# Patient Record
Sex: FEMALE | Race: BLACK OR AFRICAN AMERICAN | NOT HISPANIC OR LATINO | Employment: UNEMPLOYED
[De-identification: names, ages, dates, MRNs, and addresses within clinical notes are randomized per-mention and may not be internally consistent; named-entity substitution may affect disease eponyms.]

---

## 2017-11-06 ENCOUNTER — RECORDS - HEALTHEAST (OUTPATIENT)
Dept: ADMINISTRATIVE | Facility: OTHER | Age: 42
End: 2017-11-06

## 2017-11-06 ENCOUNTER — OFFICE VISIT - HEALTHEAST (OUTPATIENT)
Dept: FAMILY MEDICINE | Facility: CLINIC | Age: 42
End: 2017-11-06

## 2017-11-06 DIAGNOSIS — G89.29 CHRONIC PAIN OF LEFT KNEE: ICD-10-CM

## 2017-11-06 DIAGNOSIS — Z00.00 HEALTH CARE MAINTENANCE: ICD-10-CM

## 2017-11-06 DIAGNOSIS — M25.562 CHRONIC PAIN OF LEFT KNEE: ICD-10-CM

## 2017-11-06 DIAGNOSIS — R20.2 PARESTHESIA OF BOTH LOWER EXTREMITIES: ICD-10-CM

## 2017-11-06 ASSESSMENT — MIFFLIN-ST. JEOR: SCORE: 1441.04

## 2017-11-07 ENCOUNTER — COMMUNICATION - HEALTHEAST (OUTPATIENT)
Dept: FAMILY MEDICINE | Facility: CLINIC | Age: 42
End: 2017-11-07

## 2017-11-09 ENCOUNTER — HOSPITAL ENCOUNTER (OUTPATIENT)
Dept: MRI IMAGING | Facility: HOSPITAL | Age: 42
Discharge: HOME | End: 2017-11-09
Attending: NURSE PRACTITIONER

## 2017-11-09 DIAGNOSIS — M25.562 CHRONIC PAIN OF LEFT KNEE: ICD-10-CM

## 2017-11-09 DIAGNOSIS — G89.29 CHRONIC PAIN OF LEFT KNEE: ICD-10-CM

## 2017-11-13 ENCOUNTER — COMMUNICATION - HEALTHEAST (OUTPATIENT)
Dept: FAMILY MEDICINE | Facility: CLINIC | Age: 42
End: 2017-11-13

## 2017-11-17 ENCOUNTER — RECORDS - HEALTHEAST (OUTPATIENT)
Dept: ADMINISTRATIVE | Facility: OTHER | Age: 42
End: 2017-11-17

## 2018-04-17 ENCOUNTER — OFFICE VISIT - HEALTHEAST (OUTPATIENT)
Dept: FAMILY MEDICINE | Facility: CLINIC | Age: 43
End: 2018-04-17

## 2018-04-17 DIAGNOSIS — Z13.220 LIPID SCREENING: ICD-10-CM

## 2018-04-17 DIAGNOSIS — Z00.00 ROUTINE GENERAL MEDICAL EXAMINATION AT A HEALTH CARE FACILITY: ICD-10-CM

## 2018-04-17 DIAGNOSIS — E66.9 OBESITY: ICD-10-CM

## 2018-04-17 DIAGNOSIS — Z13.1 DIABETES MELLITUS SCREENING: ICD-10-CM

## 2018-04-17 DIAGNOSIS — Z30.09 STERILIZATION CONSULT: ICD-10-CM

## 2018-04-17 LAB
CHOLEST SERPL-MCNC: 132 MG/DL
FASTING STATUS PATIENT QL REPORTED: YES
FASTING STATUS PATIENT QL REPORTED: YES
GLUCOSE BLD-MCNC: 93 MG/DL (ref 70–125)
HDLC SERPL-MCNC: 41 MG/DL
HGB BLD-MCNC: 12.3 G/DL (ref 12–16)
LDLC SERPL CALC-MCNC: 79 MG/DL
TRIGL SERPL-MCNC: 61 MG/DL

## 2018-04-17 ASSESSMENT — MIFFLIN-ST. JEOR: SCORE: 1416.55

## 2018-04-18 LAB
HPV SOURCE: NORMAL
HUMAN PAPILLOMA VIRUS 16 DNA: NEGATIVE
HUMAN PAPILLOMA VIRUS 18 DNA: NEGATIVE
HUMAN PAPILLOMA VIRUS FINAL DIAGNOSIS: NORMAL
HUMAN PAPILLOMA VIRUS OTHER HR: NEGATIVE
SPECIMEN DESCRIPTION: NORMAL

## 2018-04-24 ENCOUNTER — COMMUNICATION - HEALTHEAST (OUTPATIENT)
Dept: FAMILY MEDICINE | Facility: CLINIC | Age: 43
End: 2018-04-24

## 2018-05-02 ENCOUNTER — COMMUNICATION - HEALTHEAST (OUTPATIENT)
Dept: FAMILY MEDICINE | Facility: CLINIC | Age: 43
End: 2018-05-02

## 2018-05-08 ENCOUNTER — COMMUNICATION - HEALTHEAST (OUTPATIENT)
Dept: FAMILY MEDICINE | Facility: CLINIC | Age: 43
End: 2018-05-08

## 2018-06-15 ASSESSMENT — MIFFLIN-ST. JEOR: SCORE: 1415.19

## 2018-06-20 ENCOUNTER — ANESTHESIA - HEALTHEAST (OUTPATIENT)
Dept: SURGERY | Facility: CLINIC | Age: 43
End: 2018-06-20

## 2018-06-20 ENCOUNTER — SURGERY - HEALTHEAST (OUTPATIENT)
Dept: SURGERY | Facility: CLINIC | Age: 43
End: 2018-06-20

## 2018-06-20 ASSESSMENT — MIFFLIN-ST. JEOR: SCORE: 1399.6

## 2019-03-05 ENCOUNTER — COMMUNICATION - HEALTHEAST (OUTPATIENT)
Dept: SCHEDULING | Facility: CLINIC | Age: 44
End: 2019-03-05

## 2019-03-06 ENCOUNTER — OFFICE VISIT - HEALTHEAST (OUTPATIENT)
Dept: FAMILY MEDICINE | Facility: CLINIC | Age: 44
End: 2019-03-06

## 2019-03-06 ENCOUNTER — COMMUNICATION - HEALTHEAST (OUTPATIENT)
Dept: TELEHEALTH | Facility: CLINIC | Age: 44
End: 2019-03-06

## 2019-03-06 DIAGNOSIS — L73.9 FOLLICULITIS: ICD-10-CM

## 2019-03-06 DIAGNOSIS — Z23 IMMUNIZATION DUE: ICD-10-CM

## 2019-03-06 DIAGNOSIS — L65.8 TRACTION ALOPECIA: ICD-10-CM

## 2019-03-06 ASSESSMENT — MIFFLIN-ST. JEOR: SCORE: 1279.11

## 2019-08-15 ENCOUNTER — OFFICE VISIT - HEALTHEAST (OUTPATIENT)
Dept: FAMILY MEDICINE | Facility: CLINIC | Age: 44
End: 2019-08-15

## 2019-08-15 DIAGNOSIS — R53.83 FATIGUE, UNSPECIFIED TYPE: ICD-10-CM

## 2019-08-15 DIAGNOSIS — Z13.1 DIABETES MELLITUS SCREENING: ICD-10-CM

## 2019-08-15 DIAGNOSIS — Z13.220 LIPID SCREENING: ICD-10-CM

## 2019-08-15 DIAGNOSIS — E55.9 VITAMIN D DEFICIENCY: ICD-10-CM

## 2019-08-15 DIAGNOSIS — E66.811 OBESITY (BMI 30.0-34.9): ICD-10-CM

## 2019-08-15 DIAGNOSIS — Z00.00 ROUTINE GENERAL MEDICAL EXAMINATION AT A HEALTH CARE FACILITY: ICD-10-CM

## 2019-08-15 LAB
ALBUMIN SERPL-MCNC: 3.9 G/DL (ref 3.5–5)
ALP SERPL-CCNC: 59 U/L (ref 45–120)
ALT SERPL W P-5'-P-CCNC: 16 U/L (ref 0–45)
ANION GAP SERPL CALCULATED.3IONS-SCNC: 9 MMOL/L (ref 5–18)
AST SERPL W P-5'-P-CCNC: 19 U/L (ref 0–40)
BILIRUB SERPL-MCNC: 0.5 MG/DL (ref 0–1)
BUN SERPL-MCNC: 19 MG/DL (ref 8–22)
CALCIUM SERPL-MCNC: 9.2 MG/DL (ref 8.5–10.5)
CHLORIDE BLD-SCNC: 107 MMOL/L (ref 98–107)
CHOLEST SERPL-MCNC: 120 MG/DL
CO2 SERPL-SCNC: 25 MMOL/L (ref 22–31)
CREAT SERPL-MCNC: 0.71 MG/DL (ref 0.6–1.1)
ERYTHROCYTE [DISTWIDTH] IN BLOOD BY AUTOMATED COUNT: 12.5 % (ref 11–14.5)
ERYTHROCYTE [SEDIMENTATION RATE] IN BLOOD BY WESTERGREN METHOD: 8 MM/HR (ref 0–20)
FASTING STATUS PATIENT QL REPORTED: YES
GFR SERPL CREATININE-BSD FRML MDRD: >60 ML/MIN/1.73M2
GLUCOSE BLD-MCNC: 85 MG/DL (ref 70–125)
HBA1C MFR BLD: 5.5 % (ref 3.5–6)
HCT VFR BLD AUTO: 37.2 % (ref 35–47)
HDLC SERPL-MCNC: 40 MG/DL
HGB BLD-MCNC: 12.2 G/DL (ref 12–16)
LDLC SERPL CALC-MCNC: 69 MG/DL
MCH RBC QN AUTO: 27.4 PG (ref 27–34)
MCHC RBC AUTO-ENTMCNC: 32.7 G/DL (ref 32–36)
MCV RBC AUTO: 84 FL (ref 80–100)
PLATELET # BLD AUTO: 168 THOU/UL (ref 140–440)
PMV BLD AUTO: 9.5 FL (ref 7–10)
POTASSIUM BLD-SCNC: 4 MMOL/L (ref 3.5–5)
PROT SERPL-MCNC: 7 G/DL (ref 6–8)
RBC # BLD AUTO: 4.44 MILL/UL (ref 3.8–5.4)
SODIUM SERPL-SCNC: 141 MMOL/L (ref 136–145)
TRIGL SERPL-MCNC: 53 MG/DL
TSH SERPL DL<=0.005 MIU/L-ACNC: 1.11 UIU/ML (ref 0.3–5)
VIT B12 SERPL-MCNC: 783 PG/ML (ref 213–816)
WBC: 5.3 THOU/UL (ref 4–11)

## 2019-08-15 ASSESSMENT — MIFFLIN-ST. JEOR: SCORE: 1385.37

## 2019-08-16 LAB
25(OH)D3 SERPL-MCNC: 32.2 NG/ML (ref 30–80)
B BURGDOR IGG+IGM SER QL: 0.08 INDEX VALUE

## 2019-08-18 ENCOUNTER — COMMUNICATION - HEALTHEAST (OUTPATIENT)
Dept: FAMILY MEDICINE | Facility: CLINIC | Age: 44
End: 2019-08-18

## 2020-05-27 ENCOUNTER — COMMUNICATION - HEALTHEAST (OUTPATIENT)
Dept: SCHEDULING | Facility: CLINIC | Age: 45
End: 2020-05-27

## 2021-02-09 ENCOUNTER — OFFICE VISIT - HEALTHEAST (OUTPATIENT)
Dept: FAMILY MEDICINE | Facility: CLINIC | Age: 46
End: 2021-02-09

## 2021-02-09 DIAGNOSIS — Z00.00 HEALTH CARE MAINTENANCE: ICD-10-CM

## 2021-02-09 DIAGNOSIS — H65.92 OME (OTITIS MEDIA WITH EFFUSION), LEFT: ICD-10-CM

## 2021-02-09 DIAGNOSIS — R42 DIZZINESS: ICD-10-CM

## 2021-02-09 DIAGNOSIS — E66.01 MORBID OBESITY (H): ICD-10-CM

## 2021-02-09 LAB
ANION GAP SERPL CALCULATED.3IONS-SCNC: 6 MMOL/L (ref 5–18)
BUN SERPL-MCNC: 12 MG/DL (ref 8–22)
CALCIUM SERPL-MCNC: 8.9 MG/DL (ref 8.5–10.5)
CHLORIDE BLD-SCNC: 108 MMOL/L (ref 98–107)
CO2 SERPL-SCNC: 27 MMOL/L (ref 22–31)
CREAT SERPL-MCNC: 0.71 MG/DL (ref 0.6–1.1)
ERYTHROCYTE [DISTWIDTH] IN BLOOD BY AUTOMATED COUNT: 13.5 % (ref 11–14.5)
GFR SERPL CREATININE-BSD FRML MDRD: >60 ML/MIN/1.73M2
GLUCOSE BLD-MCNC: 113 MG/DL (ref 70–125)
HBA1C MFR BLD: 5.4 %
HCT VFR BLD AUTO: 38.9 % (ref 35–47)
HGB BLD-MCNC: 12.4 G/DL (ref 12–16)
MCH RBC QN AUTO: 27 PG (ref 27–34)
MCHC RBC AUTO-ENTMCNC: 31.9 G/DL (ref 32–36)
MCV RBC AUTO: 85 FL (ref 80–100)
PLATELET # BLD AUTO: 198 THOU/UL (ref 140–440)
PMV BLD AUTO: 10.8 FL (ref 7–10)
POTASSIUM BLD-SCNC: 4.4 MMOL/L (ref 3.5–5)
RBC # BLD AUTO: 4.6 MILL/UL (ref 3.8–5.4)
SODIUM SERPL-SCNC: 141 MMOL/L (ref 136–145)
TSH SERPL DL<=0.005 MIU/L-ACNC: 1.2 UIU/ML (ref 0.3–5)
WBC: 6.3 THOU/UL (ref 4–11)

## 2021-02-09 ASSESSMENT — MIFFLIN-ST. JEOR: SCORE: 1434.95

## 2021-02-16 ENCOUNTER — COMMUNICATION - HEALTHEAST (OUTPATIENT)
Dept: FAMILY MEDICINE | Facility: CLINIC | Age: 46
End: 2021-02-16

## 2021-02-18 ENCOUNTER — OFFICE VISIT - HEALTHEAST (OUTPATIENT)
Dept: PHYSICAL THERAPY | Facility: REHABILITATION | Age: 46
End: 2021-02-18

## 2021-02-18 DIAGNOSIS — R42 DIZZINESS: ICD-10-CM

## 2021-03-02 ENCOUNTER — COMMUNICATION - HEALTHEAST (OUTPATIENT)
Dept: SCHEDULING | Facility: CLINIC | Age: 46
End: 2021-03-02

## 2021-03-08 ENCOUNTER — OFFICE VISIT (OUTPATIENT)
Dept: FAMILY MEDICINE | Facility: CLINIC | Age: 46
End: 2021-03-08
Payer: COMMERCIAL

## 2021-03-08 VITALS
SYSTOLIC BLOOD PRESSURE: 120 MMHG | WEIGHT: 189 LBS | HEART RATE: 74 BPM | DIASTOLIC BLOOD PRESSURE: 72 MMHG | HEIGHT: 62 IN | BODY MASS INDEX: 34.78 KG/M2 | TEMPERATURE: 98 F

## 2021-03-08 DIAGNOSIS — H69.93 DYSFUNCTION OF BOTH EUSTACHIAN TUBES: Primary | ICD-10-CM

## 2021-03-08 PROCEDURE — 99203 OFFICE O/P NEW LOW 30 MIN: CPT | Performed by: PHYSICIAN ASSISTANT

## 2021-03-08 RX ORDER — CETIRIZINE HYDROCHLORIDE 10 MG/1
10 TABLET ORAL DAILY
Qty: 30 TABLET | Refills: 0 | Status: SHIPPED | OUTPATIENT
Start: 2021-03-08 | End: 2021-10-14

## 2021-03-08 RX ORDER — FLUTICASONE PROPIONATE 50 MCG
1 SPRAY, SUSPENSION (ML) NASAL DAILY
Qty: 16 G | Refills: 0 | Status: SHIPPED | OUTPATIENT
Start: 2021-03-08 | End: 2021-04-27

## 2021-03-08 ASSESSMENT — MIFFLIN-ST. JEOR: SCORE: 1465.55

## 2021-03-08 ASSESSMENT — PAIN SCALES - GENERAL: PAINLEVEL: NO PAIN (0)

## 2021-03-08 NOTE — PROGRESS NOTES
"    Assessment & Plan         ICD-10-CM    1. Dysfunction of both eustachian tubes  H69.83 fluticasone (FLONASE) 50 MCG/ACT nasal spray     cetirizine (ZYRTEC) 10 MG tablet     OTOLARYNGOLOGY REFERRAL     Exam normal - discussed with patient    Patient Instructions   For then next 2-4 weeks, I want you to try the following:     Flonase (fluticasone) nasal spray - 1 spray into each nostril once daily    Zyrtec (certirizine) 10mg once daily in the morning      If the plugged/full sensation in the ears is still happening even after taking the medications above, follow up with the Ear, Nose, Throat doctor.     Call [facility]: [facility] [place] [phone]  To schedule this appointment        BMI:   Estimated body mass index is 34.57 kg/m  as calculated from the following:    Height as of this encounter: 1.575 m (5' 2\").    Weight as of this encounter: 85.7 kg (189 lb).     Return in about 4 weeks (around 4/5/2021) for With specialist.    [facility] SABINA Chua is a 45 year old who presents for the following health issues    HPI       Patient is here today with concerns of pressure in both of her ears. She is also feeling like off balance and dizzy. It has been going on for about 1 month. She has been seen for this before at the  and [facility].     Currently not really taking anything for the pressure/dizzines  Denies jaw pain   No h/o teeth clenching or grinding  No URI symptoms    Review of Systems   Remainder of ROS obtained and found to be negative other than that which was documented above        Objective    /72   Pulse 74   Temp 98  F (36.7  C) (Tympanic)   Ht 1.575 m (5' 2\")   Wt 85.7 kg (189 lb)   BMI 34.57 kg/m    Body mass index is 34.57 kg/m .  Physical Exam   GENERAL: healthy, alert and no distress  EYES: Eyes grossly normal to inspection  HENT: ear canals and TM's normal, nose and mouth without ulcers or " lesions

## 2021-03-08 NOTE — PATIENT INSTRUCTIONS
For then next 2-4 weeks, I want you to try the following:     Flonase (fluticasone) nasal spray - 1 spray into each nostril once daily    Zyrtec (certirizine) 10mg once daily in the morning      If the plugged/full sensation in the ears is still happening even after taking the medications above, follow up with the Ear, Nose, Throat doctor.     Call [facility]: [facility] [employer] [phone]  To schedule this appointment

## 2021-04-27 ENCOUNTER — RECORDS - HEALTHEAST (OUTPATIENT)
Dept: SCHEDULING | Facility: CLINIC | Age: 46
End: 2021-04-27

## 2021-04-27 ENCOUNTER — OFFICE VISIT (OUTPATIENT)
Dept: FAMILY MEDICINE | Facility: CLINIC | Age: 46
End: 2021-04-27
Payer: COMMERCIAL

## 2021-04-27 VITALS
BODY MASS INDEX: 35.15 KG/M2 | OXYGEN SATURATION: 98 % | TEMPERATURE: 97.4 F | HEIGHT: 62 IN | DIASTOLIC BLOOD PRESSURE: 66 MMHG | HEART RATE: 59 BPM | SYSTOLIC BLOOD PRESSURE: 110 MMHG | WEIGHT: 191 LBS

## 2021-04-27 DIAGNOSIS — H69.93 DYSFUNCTION OF BOTH EUSTACHIAN TUBES: ICD-10-CM

## 2021-04-27 PROCEDURE — 99213 OFFICE O/P EST LOW 20 MIN: CPT | Performed by: PHYSICIAN ASSISTANT

## 2021-04-27 RX ORDER — FLUTICASONE PROPIONATE 50 MCG
1 SPRAY, SUSPENSION (ML) NASAL DAILY
Qty: 16 G | Refills: 0 | Status: SHIPPED | OUTPATIENT
Start: 2021-04-27 | End: 2021-10-14

## 2021-04-27 ASSESSMENT — PAIN SCALES - GENERAL: PAINLEVEL: NO PAIN (0)

## 2021-04-27 ASSESSMENT — MIFFLIN-ST. JEOR: SCORE: 1469.62

## 2021-04-27 NOTE — PROGRESS NOTES
"    Assessment & Plan     (H69.83) Dysfunction of both eustachian tubes  Comment: similar to symptoms she had just over a month ago that did improve with the zyrtec and flonase until she stopped them both. Will resume and monitor for improvement. May  Need to continue the flonase a little longer. Did also discuss possibility of TMJ although she denies any grinding/clenching but does endorse some extra stress/tension in the last several months  Plan: fluticasone (FLONASE) 50 MCG/ACT nasal spray            See patient instructions    BMI:   Estimated body mass index is 34.93 kg/m  as calculated from the following:    Height as of this encounter: 1.575 m (5' 2\").    Weight as of this encounter: 86.6 kg (191 lb).     Return in about 2 weeks (around 5/11/2021) for If not improving or worsening.    [facility] SABINA Chua is a 46 year old who presents for the following health issues     HPI       Patient is here today to get her right ear looked at. Feeling plugged and having some balance issues like last time shew as seen on 3/8/2021.    Was started on zyrtec and flonase after our last visit in March  She states symptoms did improve so stopped both after about 2 weeks    Now having similar symptoms but in the opposite ear  Feeling plugged   No sinus or cold symptoms     Review of Systems   Constitutional, HEENT, cardiovascular, pulmonary, gi and gu systems are negative, except as otherwise noted.      Objective    /66   Pulse 59   Temp 97.4  F (36.3  C) (Tympanic)   Ht 1.575 m (5' 2\")   Wt 86.6 kg (191 lb)   SpO2 98%   BMI 34.93 kg/m    Body mass index is 34.93 kg/m .  Physical Exam   GENERAL: healthy, alert and no distress  EARS: ear canals normal, TM's normal, small to moderate effusion noted behind right TM           "

## 2021-04-27 NOTE — PATIENT INSTRUCTIONS
RESTART the zyrtec (cetirizine) 10mg tablets and take once daily (finish out the bottle that you already have)    RESTART the fluticasone nasal spray and use daily for the next 4 weeks MINIMUM. I did send refills to the [facility] pharmacy if you should need more      Patient Education     Earache, No Infection (Adult)   Earaches can happen without an infection. They can occur when air and fluid build up behind the eardrum. They may cause a feeling of fullness and discomfort. They may also impair hearing. This is called otitis media with effusion (OME) or serous otitis media. It means there is fluid in the middle ear. It is not the same as acute otitis media, which is often from an infection.  OME can happen when you have a cold if congestion blocks the passage that drains the middle ear. This passage is called the eustachian tube. OME may also occur with nasal allergies or after a bacterial infection in the middle ear. Other causes are:    Trauma    Improper cleaning of wax from the ear    Bacterial infection of the mastoid bone (mastoiditis)    Tumor    Jaw pain    Changes in pressure, such as from flying or scuba diving    The pain or discomfort may come and go. You may hear clicking or popping sounds when you chew or swallow. You may feel that your balance is off. Or you may hear ringing in the ear.  It often takes from several weeks up to 3 months for the fluid to clear on its own. Oral pain relievers and ear drops help if there is pain. Decongestants and antihistamines sometimes help. Antibiotics don't help since there is no infection. Your healthcare provider may give you a nasal spray to help reduce swelling in the nose and eustachian tube. This can allow the ear to drain.  If your OME doesn't get better after 3 months, surgery may be used to drain the fluid. A small tube may also be put in the eardrum to help with drainage.  Because the middle ear fluid can become infected, watch for signs of an infection.  These may develop later. They may include increased ear pain, fever, or drainage from the ear.  Home care  These home-care tips will help you take care of yourself:    You may use over-the-counter medicine as directed by your healthcare provider to control pain, unless medicine was prescribed. If you have chronic liver or kidney disease or ever had a stomach ulcer or GI bleeding, talk with your healthcare provider before using any medicines.    Aspirin should never be used in anyone younger than age 18 who has a fever. It may cause severe liver damage.    Ask your healthcare provider if you may use over-the-counter decongestants such as phenylephrine or pseudoephedrine. Keep in mind they are not always helpful.    Talk with your healthcare provider about using nasal spray decongestants. Don't use them for more than 3 days, or as directed by your healthcare provider. Longer use can make congestion worse. Prescription nasal sprays from your healthcare provider don't often have such restrictions.    Antihistamines may help if you are also having allergy symptoms.    You may use medicines such as guaifenesin to thin mucus and help with drainage.  Follow-up care  Follow up with your healthcare provider or as advised if you are not feeling better after 3 days.  When to seek medical advice  Call your healthcare provider right away if any of these occur:    Ear pain that gets worse or that does not start to get better     Fever of 100.4 F (38 C) or higher, or as directed by your healthcare provider    Fluid or blood draining from the ear    Headache or sinus pain    Stiff neck    Unusual drowsiness or confusion  [employer] last reviewed this educational content on 12/1/2019 2000-2021 [employer]. All rights reserved. This information is not intended as a substitute for professional medical care. Always follow your healthcare professional's instructions.

## 2021-05-06 ENCOUNTER — TELEPHONE (OUTPATIENT)
Dept: FAMILY MEDICINE | Facility: CLINIC | Age: 46
End: 2021-05-06

## 2021-05-06 DIAGNOSIS — H69.93 DYSFUNCTION OF BOTH EUSTACHIAN TUBES: Primary | ICD-10-CM

## 2021-05-06 NOTE — TELEPHONE ENCOUNTER
Reason for Call:  Medication or medication refill:    Do you use a [facility]?  Name of the pharmacy and phone number for the current request:  [id number] [place] [address] [address] [address]    Name of the medication requested: Another medication similar to fluticasone (FLONASE) 50 MCG/ACT nasal spray    Other request: Pt called and said the Flonase she is taking right now is not working for her and she is requesting some thing different please advise thank you    Can we leave a detailed message on this number? YES    Phone number patient can be reached at: Home number on file [phone] (home)    Best Time: anytime    Call taken on 5/6/2021 at 4:53 PM by Johanny Pettit

## 2021-05-07 RX ORDER — AZELASTINE 1 MG/ML
1 SPRAY, METERED NASAL 2 TIMES DAILY
Qty: 30 ML | Refills: 1 | Status: SHIPPED | OUTPATIENT
Start: 2021-05-07 | End: 2022-10-31

## 2021-05-07 NOTE — TELEPHONE ENCOUNTER
Call placed to patient, relayed recommendations per M Gromarisol.  Patient would like to see ENT, relayed scheduling number per referral.  Silvia Nelson RN

## 2021-05-07 NOTE — TELEPHONE ENCOUNTER
New script for astelin (a different nasal spray) sent to the pharmacy requested. If she continues to have symptoms despite this, then next step would be to see ENT. I did place the referral should she need to use it   [facility] [phone]      Ann Marie Zuleta PA-C

## 2021-05-10 ENCOUNTER — HOSPITAL ENCOUNTER (EMERGENCY)
Dept: EMERGENCY MEDICINE | Facility: HOSPITAL | Age: 46
Discharge: HOME | End: 2021-05-10
Attending: EMERGENCY MEDICINE
Payer: COMMERCIAL

## 2021-05-10 DIAGNOSIS — K22.2 ESOPHAGEAL OBSTRUCTION: ICD-10-CM

## 2021-05-14 ENCOUNTER — TRANSFERRED RECORDS (OUTPATIENT)
Dept: HEALTH INFORMATION MANAGEMENT | Facility: CLINIC | Age: 46
End: 2021-05-14
Payer: COMMERCIAL

## 2021-05-18 ENCOUNTER — OFFICE VISIT - HEALTHEAST (OUTPATIENT)
Dept: FAMILY MEDICINE | Facility: CLINIC | Age: 46
End: 2021-05-18

## 2021-05-18 DIAGNOSIS — Z00.00 ROUTINE GENERAL MEDICAL EXAMINATION AT A HEALTH CARE FACILITY: ICD-10-CM

## 2021-05-18 DIAGNOSIS — Z13.220 LIPID SCREENING: ICD-10-CM

## 2021-05-18 DIAGNOSIS — E66.01 MORBID OBESITY (H): ICD-10-CM

## 2021-05-18 DIAGNOSIS — H65.91 OME (OTITIS MEDIA WITH EFFUSION), RIGHT: ICD-10-CM

## 2021-05-18 DIAGNOSIS — Z11.59 ENCOUNTER FOR HEPATITIS C SCREENING TEST FOR LOW RISK PATIENT: ICD-10-CM

## 2021-05-18 DIAGNOSIS — Z13.1 DIABETES MELLITUS SCREENING: ICD-10-CM

## 2021-05-18 DIAGNOSIS — Z12.31 ENCOUNTER FOR SCREENING MAMMOGRAM FOR BREAST CANCER: ICD-10-CM

## 2021-05-20 ENCOUNTER — AMBULATORY - HEALTHEAST (OUTPATIENT)
Dept: LAB | Facility: CLINIC | Age: 46
End: 2021-05-20

## 2021-05-20 DIAGNOSIS — Z13.220 LIPID SCREENING: ICD-10-CM

## 2021-05-20 DIAGNOSIS — Z00.00 ROUTINE GENERAL MEDICAL EXAMINATION AT A HEALTH CARE FACILITY: ICD-10-CM

## 2021-05-20 DIAGNOSIS — Z11.59 ENCOUNTER FOR HEPATITIS C SCREENING TEST FOR LOW RISK PATIENT: ICD-10-CM

## 2021-05-20 DIAGNOSIS — Z13.1 DIABETES MELLITUS SCREENING: ICD-10-CM

## 2021-05-20 LAB
CHOLEST SERPL-MCNC: 102 MG/DL
FASTING STATUS PATIENT QL REPORTED: YES
FASTING STATUS PATIENT QL REPORTED: YES
GLUCOSE BLD-MCNC: 89 MG/DL (ref 70–125)
HDLC SERPL-MCNC: 43 MG/DL
HGB BLD-MCNC: 11.7 G/DL (ref 12–16)
LDLC SERPL CALC-MCNC: 42 MG/DL
TRIGL SERPL-MCNC: 84 MG/DL

## 2021-05-21 LAB — HCV AB SERPL QL IA: NEGATIVE

## 2021-05-24 ENCOUNTER — COMMUNICATION - HEALTHEAST (OUTPATIENT)
Dept: FAMILY MEDICINE | Facility: CLINIC | Age: 46
End: 2021-05-24

## 2021-05-27 VITALS
WEIGHT: 192.4 LBS | HEART RATE: 63 BPM | BODY MASS INDEX: 35.19 KG/M2 | OXYGEN SATURATION: 98 % | DIASTOLIC BLOOD PRESSURE: 64 MMHG | SYSTOLIC BLOOD PRESSURE: 92 MMHG

## 2021-05-27 VITALS — BODY MASS INDEX: 34.02 KG/M2 | WEIGHT: 186 LBS

## 2021-05-31 ENCOUNTER — RECORDS - HEALTHEAST (OUTPATIENT)
Dept: ADMINISTRATIVE | Facility: CLINIC | Age: 46
End: 2021-05-31

## 2021-05-31 VITALS — BODY MASS INDEX: 33.99 KG/M2 | WEIGHT: 184.7 LBS | HEIGHT: 62 IN

## 2021-05-31 NOTE — PROGRESS NOTES
"Assessment and Plan:    1. Routine general medical examination at a health care facility  Discussed consuming a healthy diet and exercising.  She is up-to-date on vaccinations.  She is due for mammogram.    2. Lipid screening  - Lipid Cascade    3. Diabetes mellitus screening  - Glycosylated Hemoglobin A1c    4. Obesity (BMI 30.0-34.9)  The following high BMI interventions were performed this visit: encouragement to exercise and lifestyle education regarding diet    5. Vitamin D deficiency  She is taking 2000 units of vitamin D3 daily.  - Vitamin D, Total (25-Hydroxy)    6. Fatigue, unspecified type  We will rule out anemia, diabetes, vitamin deficiencies, thyroid disease, Lyme's.  Discussed good sleep hygiene.  Further plans pending the results.  The patient may have had a viral illness which has improved.  If symptoms return, suggest follow-up.  She is content with the plan.  - HM2(CBC w/o Differential)  - Comprehensive Metabolic Panel  - Vitamin B12  - Thyroid Cascade  - Erythrocyte Sedimentation Rate  - Lyme Antibody Cascade      Subjective:     Toma is a 44 y.o. female presenting to the clinic for a female physical.     LMP: , irregular;  Occasionally skips months  Hx of abnormal pap smear: 12 ASCUS could not exclude a high grade squamous lesion.  Colposcopy performed   Last pap smear: 18 normal, negative HPV   Perform self-breast exams: yes  Vaginal discharge or irritation: none   Sexually active: yes,  x 11 years  Contraception: tubal ligation  Concerns for STDs: none   Previous pregnancies:three pregnancies,    All boys ages 10, 8, 4     Patient states 2 weeks ago, she felt \"cloudy in the brain\".  She had generalized body aches and damp skin.  She did not have any cold symptoms including rhinorrhea, headache, stomachache, nausea, vomiting, fever, constipation, diarrhea.  She felt fatigued.  She increased her fluid intake.  Patient states her symptoms have resolved except she " does continue to experience some mild fatigue.  She did have a wood tick bite 1 week ago. No rash developed.  She denies any known deer tick bites.    Review of systems:  I performed a 10 point review of systems.  All pertinent positives and negatives are noted in the HPI. All others are negative.     No Known Allergies    Current Outpatient Medications on File Prior to Visit   Medication Sig Dispense Refill     cholecalciferol, vitamin D3, (VITAMIN D3) 2,000 unit cap Take 1 capsule by mouth daily.       ibuprofen (ADVIL,MOTRIN) 200 MG tablet Take 400 mg by mouth every 6 (six) hours as needed for pain.       No current facility-administered medications on file prior to visit.        Social History     Socioeconomic History     Marital status:      Spouse name: Not on file     Number of children: Not on file     Years of education: Not on file     Highest education level: Not on file   Occupational History     Not on file   Social Needs     Financial resource strain: Not on file     Food insecurity:     Worry: Not on file     Inability: Not on file     Transportation needs:     Medical: Not on file     Non-medical: Not on file   Tobacco Use     Smoking status: Never Smoker     Smokeless tobacco: Never Used   Substance and Sexual Activity     Alcohol use: No     Drug use: No     Sexual activity: Yes     Partners: Male   Lifestyle     Physical activity:     Days per week: Not on file     Minutes per session: Not on file     Stress: Not on file   Relationships     Social connections:     Talks on phone: Not on file     Gets together: Not on file     Attends [employer] service: Not on file     Active member of club or organization: Not on file     Attends meetings of clubs or organizations: Not on file     Relationship status: Not on file     Intimate partner violence:     Fear of current or ex partner: Not on file     Emotionally abused: Not on file     Physically abused: Not on file     Forced sexual activity:  Not on file   Other Topics Concern     Not on file   Social History Narrative     Not on file       Past Medical History:   Diagnosis Date     Abnormal Pap smear of cervix      Anemia      Herpes        Family History   Problem Relation Age of Onset     No Medical Problems Mother      No Medical Problems Father        Past Surgical History:   Procedure Laterality Date      SECTION       [place]  DELIVERY ONLY      Description:  Section;  Recorded: 2011;     [place] LAP,TUBAL CAUTERY Bilateral 2018    Procedure: LAPAROSCOPIC BILATERAL SALPINGECTOMY;  Surgeon: Kylah Rivas MD;  Location[facility];  Service: Gynecology       Objective:     Vitals:    08/15/19 0906   BP: 90/58   Pulse: (!) 52   SpO2: 96%       Patient is alert, no obvious distress.   Skin: Warm, dry.  No rashes or lesions. Skin turgor rapid return.   HEENT:  Eyes normal.  Ears normal.  Nose patent, mucosa pink.  Oropharynx mucosa pink, no lesions or tonsil enlargement.   Neck:  Supple, without lymphadenopathy, bruits, JVD. Thyroid normal texture and size.    Lungs:  Clear to auscultation.  No wheezing, rales noted.  Respirations even and unlabored.   Heart:  Regular rate and rhythm.  No murmurs.   Breasts:  Normal.  No surrounding adenopathy.   Abdomen: Soft, nontender.  No organomegaly.  Bowel sounds normoactive.  No guarding or masses noted.   :  deferred  Musculoskeletal:  Full ROM of extremities.  Muscle strength equal +5/5.   Neurological:  Cranial nerves 2-12 intact.

## 2021-06-01 VITALS — BODY MASS INDEX: 33 KG/M2 | WEIGHT: 179.3 LBS | HEIGHT: 62 IN

## 2021-06-01 VITALS — BODY MASS INDEX: 32.31 KG/M2 | HEIGHT: 62 IN | WEIGHT: 175.56 LBS

## 2021-06-02 VITALS — WEIGHT: 149 LBS | HEIGHT: 62 IN | BODY MASS INDEX: 27.42 KG/M2

## 2021-06-03 VITALS — BODY MASS INDEX: 33.38 KG/M2 | HEIGHT: 61 IN | WEIGHT: 176.8 LBS

## 2021-06-05 VITALS
BODY MASS INDEX: 35.21 KG/M2 | SYSTOLIC BLOOD PRESSURE: 114 MMHG | WEIGHT: 186.5 LBS | HEIGHT: 61 IN | DIASTOLIC BLOOD PRESSURE: 72 MMHG | HEART RATE: 69 BPM | OXYGEN SATURATION: 97 %

## 2021-06-08 NOTE — TELEPHONE ENCOUNTER
"\"I wanted to swallow and it blocked my air way.\" Patient reporting she was eating yogurt with nuts on it and feels a nut may have gotten stuck.   Patient was able to clear throat and swallow. Denies any current symptoms.  Patient has eaten bread since with out difficulty.  Patient stating she checked her heart rate during episode and it was 58-60 beats per minute.   Patient is questioning low heart rate. Reviewed per [facility] previous visits and pulse rate with patient. Pulse 52 beats per minute at 8/15/19 office visit.    Reviewed home care per protocol.     COVID 19 Nurse Triage Plan/Patient Instructions    Please be aware that novel coronavirus (COVID-19) may be circulating in the community. If you develop symptoms such as fever, cough, or SOB or if you have concerns about the presence of another infection including coronavirus (COVID-19), please contact your health care provider or visit [email or web address].     Disposition/Instructions    Patient to stay at home and follow home care protocol based instructions.    Thank you for limiting contact with others, wearing a simple mask to cover your cough, practice good hand hygiene habits and accessing our virtual services where possible to limit the spread of this virus.    For more information about COVID19 and options for caring for yourself at home, please visit the CDC website at https://www.cdc.gov/coronavirus/2019-ncov/about/steps-when-sick.html  For more options for care at [facility], please visit our website at [email or web address]    For more information, please use the [place] Department of Health COVID-19 Website: https://www.health.state.[place]/diseases/coronavirus/index.html  [place] Department of Health ([facility]) COVID-19 Hotlines (Interpreters available):      Health questions: Phone Number: [phone] or 1-291.447.9179 and Hours: 7 a.m. to 7 p.m.    Schools and  questions: Phone Number: [phone] or " [phone] and Hours 7 a.m. to 7 p.m.                        Reason for Disposition    Recovered from choking    Protocols used: CHOKING - INHALED FOREIGN BODY-A-AH

## 2021-06-13 NOTE — PROGRESS NOTES
Assessment and Plan:     1. Chronic pain of left knee  I am concerned for possible meniscus injury versus patellofemoral syndrome.  Will obtain MRI for further evaluation.  Discussed symptomatic treatment including rest and ice.  Will refer to orthopedics.  - MR Knee Without Contrast Left; Future  - Ambulatory referral to Orthopedics    2. Paresthesia of both lower extremities  We will rule out vitamin B12 deficiency and DVT.  Discussed possible peripheral neuropathy and offered referral to neurology, but she declines.  She would like to see if symptoms improve.  She is content with the plan.  - Vitamin B12  - D-dimer, Quantitative    3. Health care maintenance  - Influenza, Seasonal,Quad Inj, 36+ MOS  - Mammo Screening Bilateral; Future        Subjective:     Toma is a 42 y.o. female presenting to the clinic for follow-up on emergency room evaluation.  Patient presented to the ER in 10/27/17 after she developed a tingling sensation within her lower extremities.  Patient had been cooking when symptoms developed.  She became weak, felt fatigued and had to sit down.  The tingling occurred intermittently and she was unable to stand when this occurred. She did have some lower back pain when she sat upright.  Hemogram urinalysis, magnesium, phosphorus, thyroid cascade were negative.  Urine pregnancy test was also negative.  MRI of the thoracic and lumbar spine showed the following:    CONCLUSION: THORACIC: 1.  Unremarkable MRI of the thoracic spine. No cord signal abnormality, central canal stenosis or appreciable foraminal stenosis. LUMBAR: 1.  There are chronic L5 pars defects with grade 1 anterolisthesis. There is mild narrowing of the bilateral L5-S1 neural foramina. 2.  Otherwise unremarkable MRI of the lumbar spine.     Patient states she had one more episode of tingling within her lower extremities 1 week later 2 hours after shopping.  Patient states she sat down on the numbness and tingling resolved.  She has  not had any episodes since.  She feels a tingling mostly sensation around her ankles.  She has not noticed any redness, swelling, or bruising.  She has not traveled recently.  She denies any calf pain.  She has not had any low back pain.  She has not had any urinary or fecal incontinence or retention.    Patient is also concerned of left knee pain for 6 months.  She describes the pain as an intermittent sharp sensation that occurs primarily with walking up and down stairs.  She has not had any injury.  She has not noticed any redness, swelling, bruising of her knee. She has not taken any OTC pain medication.      Review of Systems: A complete 14 point review of systems was obtained and is negative or as stated in the history of present illness.    Social History     Social History     Marital status:      Spouse name: N/A     Number of children: N/A     Years of education: N/A     Occupational History     Not on file.     Social History Main Topics     Smoking status: Never Smoker     Smokeless tobacco: Not on file     Alcohol use No     Drug use: No     Sexual activity: Yes     Partners: Male     Other Topics Concern     Not on file     Social History Narrative       Active Ambulatory Problems     Diagnosis Date Noted     Dizziness      Human Papilloma Virus Infection      Cerv Pap Smear (+) Atyp Squamous Cells Undetermined Signif      Cervical Dysplasia      Herpes Simplex Type II      Hematuria      Menorrhagia      Recent Weight Gain (___ Lbs)      Helicobacter Pylori (H. Pylori) Infection      Umbilical Hernia      Herpes Simplex Type I      Conductive Hearing Loss      Amenorrhea      Esophageal Reflux      Impaired Glucose Tolerance Test      Previous  section 2014     Status post repeat low transverse  section 2014     Vitamin D deficiency 2016     Obesity (BMI 30.0-34.9)      Finger infection 2017     Resolved Ambulatory Problems     Diagnosis Date Noted      "Abdominal Pain Around The Belly Button (Periumbilical)      Fatigue      Headache      External hemorrhoids      Pain During Urination (Dysuria)      Acute Serous Otitis Media Of The Left Ear      Pregnancy      Abdominal pain 08/20/2016     Appendicitis 08/20/2016     Preoperative clearance      Past Medical History:   Diagnosis Date     Abnormal Pap smear of cervix      Anemia      Herpes        No family history on file.    Objective:     /68 (Patient Site: Left Arm, Patient Position: Sitting, Cuff Size: Adult Regular)  Pulse 64  Ht 5' 2\" (1.575 m)  Wt 184 lb 11.2 oz (83.8 kg)  LMP 10/14/2017  SpO2 98%  BMI 33.78 kg/m2    Patient is alert, in no obvious distress.   Skin: Warm, dry.    Lungs:  Clear to auscultation. Respirations even and unlabored.  No wheezing or rales noted.   Heart:  Regular rate and rhythm.  No murmurs, S3, S4, gallops, or rubs.    Musculoskeletal:  Full ROM of extremities.  DTRs symmetrical, sensations intact.  No obvious deformity.  Muscle strength equal +5/5. She has some small varicose veins and spider veins noted within her left lower extremity.  Ifeoma's sign is negative.  Pedal pulses present and palpable.  She has full range of motion of her left knee.  There is no erythema, ecchymosis, signs of trauma.  Valgus, varus, Lachman's are negative.  Positive José Miguel's medial aspect of the knee.              "

## 2021-06-15 ENCOUNTER — RECORDS - HEALTHEAST (OUTPATIENT)
Dept: ADMINISTRATIVE | Facility: OTHER | Age: 46
End: 2021-06-15

## 2021-06-15 NOTE — PROGRESS NOTES
[facility]   Vestibular Initial Evaluation     Patient Name: Toma White  Date of evaluation: 2/18/2021  Referral Diagnosis: Dizziness  Referring provider: Yandy Shell CNP  Visit Diagnosis:     ICD-10-CM    1. Dizziness  R42            Assessment:        Toma White is a 45 y.o. female who presents to the Physical Therapy evaluation with the chief complaints of dizziness. Onset date of sx was Feb 1 when patient had a bout of dizziness and then went to the hospital on 2/7 after it did not resolve and was eventually referred to PT.  The patient's symptoms have impacted the patient's ability to do dishes without feeling dizzy.  The PT eval reveals impaired smooth pursuit but pt was most symptomatic with L Fitz Hallpike but no nystagmus was witnessed.  Treatment was initiated focusing on a L PC CRP. Patient is motivated and willing to participate in physical therapy treatment. Patient is appropriate for skilled 1:1 PT to address her physical and functional impairments.  Pt. is appropriate for skilled PT intervention as outlined in the Plan of Care (POC).  Pt. is a good candidate for skilled PT services to improve pain levels and function.     Plan of care and goals were established in collaboration with patient.     Goals:  Pt. will demonstrate/verbalize independence in self-management of condition in : 6 weeks  Pt. will be independent with home exercise program in : 6 weeks    Pt will: report no dizziness when doing the dishes in 4-6 weeks      Patient's expectations/goals are realistic.    Barriers to Learning or Achieving Goals:  No Barriers.       Plan / Patient Instructions:        Plan of Care:   Communication with: Referral Source  Patient Related Instruction: Nature of Condition;Treatment plan and rationale;Self Care instruction;Basis of treatment;Body mechanics;Posture;Next steps;Precautions;Expected outcome  Times per Week: 1  Number of Weeks: 4-6  Number of Visits:  "6  Discharge Planning: When pt meets PT goals; or when pt progress plateaus; or when pt is independent with HEP for ongoing symptom management  Precautions / Restrictions : None  Therapeutic Exercise: ROM;Stretching;Strengthening  Neuromuscular Reeducation: core;vestibular;balance/proprioception      POC and pathology of condition were reviewed with patient.  Pt. is in agreement with the Plan of Care      Plan for next visit: Reassess for BPPV      Subjective:       Date of Injury: 2/1/2021    Mechanism of Injury:  No particular event that caused her dizziness.  The pt reports having worsening imbalance and dizziness over a week before going to the ER on 2/7/2021.  PT f/u with with her PCP, Yandy Shell CNP who assessed dizziness with a Fitz Hallpike and revealed (+) nystagmus with the L side and was issued meclazine and a referral to PT for dizziness.    Previous level of function:  See current    Current level of function:  Pt currently unemployed but is managing her 3 sons with distance learning.  Pt enjoys cooking and gardening.    Living situation:  Lives with 3 sons and spouse.    Ongoing symptoms:  Dizziness (getting better); pt feels like something is in her left ear and is moving around which is bothersome to her.    Dizziness:   -Frequency: a few times a week; getting better   -Description of dizziness without using the word \"dizzy\":  Like she's falling   -Exacerbating factors:  Bending over, looking down   -Relieving factors:  Sitting, resting   -Dizziness history:  Pt had a short bout of dizziness about 2 years ago that resolved on it's own.   -History of Motion sensitivity? No   -Any hearing loss? Denies any but EMR reveals conductive hearing loss   -Current:  0/10, Best:  0/10, Worst:  6/10    Balance:   -When do you lose balance?:  Denies any issues with balance   -Denies any falls.    Functional limitations are described as occurring with:   bending     Objective:   Note: Items left blank indicates " the item was not performed or not indicated at the time of the evaluation.     Vestibular Disorder Examination    ICD-10-CM    1. Dizziness  R42         Precautions/Restrictions: None    Functional Mobility: Indpendent     Postural Assessment: WNL    Cervical Screen:   AROM: WNL and no c/o pain  Vertebral Basilar Artery: Normal    Gait Assessment  Does patient use an ambulatory device? None  Gait:Normal      Oculomotor Assessment     Ocular AROM: Normal  Smooth Pursuit: Abnormal- Saccadic movement noted with R and L quadrants as well as occasionally tracking through midline  Saccades: Normal    VOR Cancellation: Normal  Slow VOR: Occasional difficulty staying on target with eyes    Right Head Impulse Test: Pt has wandering eyes despite cues and retesting  Left Head Impulse Test: Pt has wandering eyes despite cues and retesting  Dynamic Visual Acuity: NT    Positional Testing:  Patient educated on positional testing technique and purpose of recreating symptoms - patient consented. Vestibular semi-circular canal assessment withOUT infrared goggle, fixation removed:   Left [place] Hallpike: No nystagmus witness; pt reported brief dizziness  Right Fitz Hallpike: Normal  Left Supine Roll Test (HC): NT  Right Supine Roll Test (HC): NT  Patient educated on potential findings.    Sensory Organization Tests:  Modified CTSIB:   Normal Surface Eyes Open- Normal  Normal Surface Eyes Closed- Normal  Perturbed Surface Eyes Open- Normal  Perturbed Surface Eyes Closed- Normal    Balance Assessment  Functional Gait Assessment:  NT     Patient Outcome Measures :    DHI: 22/100     Treatment Today:      Canalith Repositioning Procedure  Treatment for L PC BPPV and dizziness with positional changes. Patient educated on maneuver and purpose of maneuver. Patient consented to treatment. Canal repositioning maneuver performed to L PC for canalithiasis BPPV x 2 cycles with 30 second holds in each position after symptoms subsided. Two minute rest  break between cycles was provided.    TREATMENT MINUTES COMMENTS   Evaluation 30     Self-care/ Home management  10  Pt instructed in post CRP instructions, how to sleep, and movements to avoid over the next week.  Pt given handouts and all questions addressed.   Neuromuscular Re-education       Canalith repositioning procedure  20  See above           Total 60     Blank areas are intentional and mean the treatment did not include these items.        PT Evaluation Code: (Please list factors)  Patient History/Comorbidities: See PMH   Examination: see above   Clinical Presentation: Stable  Clinical Decision Making: Low    Patient History/  Comorbidities Examination  (body structures and functions, activity limitations, and/or participation restrictions) Clinical Presentation Clinical Decision Making (Complexity)   No documented Comorbidities or personal factors 1-2 Elements Stable and/or uncomplicated Low   1-2 documented comorbidities or personal factor 3 Elements Evolving clinical presentation with changing characteristics Moderate   3-4 documented comorbidities or personal factors 4 or more Unstable and unpredictable High            Denita Dubois, PT, DPT  2/19/2021  8:41 AM

## 2021-06-15 NOTE — TELEPHONE ENCOUNTER
Triage call:    Patient has done PT for her dizziness/vertigo symptoms.   Balance is getting better.   Discomfort in both ears. Feels like both ears are clogged.   Denies any pain.   Patient can hear but states she can't talk on the phone for too long as the sound bothers her.   Patient wants to be seen, declines appointment today.   Patient wants appointment tomorrow in clinic. RN also advised of going to [facility] today.  Patient stated understanding and was transferred to scheduling to make in office appointment.     Ann Marie Lee RN 03/02/21 9:32 AM  [facility] Nurse Advisor      COVID 19 Nurse Triage Plan/Patient Instructions    Please be aware that novel coronavirus (COVID-19) may be circulating in the community. If you develop symptoms such as fever, cough, or SOB or if you have concerns about the presence of another infection including coronavirus (COVID-19), please contact your health care provider or visit [email or web address].     Disposition/Instructions    In-Person Visit with provider recommended. Reference Visit Selection Guide.    Thank you for taking steps to prevent the spread of this virus.  o Limit your contact with others.  o Wear a simple mask to cover your cough.  o Wash your hands well and often.    Resources    [facility]: About COVID-19: [email or web address]    CDC: What to Do If You're Sick: www.cdc.gov/coronavirus/2019-ncov/about/steps-when-sick.html    CDC: Ending Home Isolation: www.cdc.gov/coronavirus/2019-ncov/hcp/disposition-in-home-patients.html     CDC: Caring for Someone: www.cdc.gov/coronavirus/2019-ncov/if-you-are-sick/care-for-someone.html     [facility]: Interim Guidance for Hospital Discharge to Home: www.health.[facility].mn.us/diseases/coronavirus/hcp/hospdischarge.pdf    [facility] clinical trials (COVID-19 research studies): [email or web address]/umn-[email or web address]-trials     Below are the COVID-19 hotlines at the [place] Department of [facility] ([facility]).  Interpreters are available.   o For health questions: Call 838-257-6125 or 1-440.702.9909 (7 a.m. to 7 p.m.)  o For questions about schools and childcare: Call 279-572-0614 or 1-754.405.7230 (7 a.m. to 7 p.m.)     Reason for Disposition    Patient wants to be seen    Additional Information    Negative: Ear pain is the main symptom    Negative: Hearing loss (complete or partial) is the main symptom    Negative: Earwax is the main concern    Negative: Has nasal allergies and they are acting up    Negative: Earache lasts > 1 hour    Negative: Pus or cloudy discharge from ear canal    Protocols used: EAR - CONGESTION-A-[place]

## 2021-06-15 NOTE — PROGRESS NOTES
Assessment and Plan:     1. Dizziness  meclizine (ANTIVERT) 25 mg tablet    Ambulatory referral to PT/OT    HM2(CBC w/o Differential)    Basic Metabolic Panel    Thyroid Cascade    Symptomatic COVID-19 Virus (CORONAVIRUS) PCR    Glycosylated Hemoglobin A1c   2. OME (otitis media with effusion), left     3. Morbid obesity (H)     4. Health care maintenance  Influenza, Seasonal Quad, PF =/> 6months     Dizziness is consistent with benign paroxysmal positional vertigo, eustachian tube dysfunction, or OME.  Fitz-Hallpike maneuver was positive on the left.  Will treat with meclizine as needed.  Will refer to PT/OT for vestibular rehab.  We will also rule out underlying anemia, diabetes, electrolyte imbalance, thyroid disease.  If symptoms persist or worsen, she is to follow-up.  She is due for fasting physical with Pap.  Influenza vaccine is provided.  She is content with the plan.    Subjective:     Toma is a 45 y.o. female presenting to the clinic for concerns for ongoing dizziness.  Patient was seen in the emergency room on 2/7/2020 after experiencing intermittent dizziness for 2 days.  Neurological exam was normal.  That provider did not suspect stroke, mass lesion, intracranial hemorrhage or other neurosurgical emergency.  No brain imaging was performed.  EKG and blood work were not indicated.  Patient follows up today continue to experience intermittent dizziness primarily with positional changes.  Dizziness occurs with standing or any sort of movement.  Sitting improves the dizziness.  She has felt as though something was in her left ear.  She did have some sinus congestion this morning.  She denies nausea, vomiting, headache, cough, shortness of breath, loss of sense of smell and taste.  She denies known Covid exposure.  She has not traveled recently.  She has tried taking over-the-counter ibuprofen.    Review of Systems: A complete 14 point review of systems was obtained and is negative or as stated in the  history of present illness.    Social History     Socioeconomic History     Marital status:      Spouse name: Not on file     Number of children: Not on file     Years of education: Not on file     Highest education level: Not on file   Occupational History     Not on file   Social Needs     Financial resource strain: Not on file     Food insecurity     Worry: Not on file     Inability: Not on file     Transportation needs     Medical: Not on file     Non-medical: Not on file   Tobacco Use     Smoking status: Never Smoker     Smokeless tobacco: Never Used   Substance and Sexual Activity     Alcohol use: No     Drug use: No     Sexual activity: Yes     Partners: Male   Lifestyle     Physical activity     Days per week: Not on file     Minutes per session: Not on file     Stress: Not on file   Relationships     Social connections     Talks on phone: Not on file     Gets together: Not on file     Attends [employer] service: Not on file     Active member of club or organization: Not on file     Attends meetings of clubs or organizations: Not on file     Relationship status: Not on file     Intimate partner violence     Fear of current or ex partner: Not on file     Emotionally abused: Not on file     Physically abused: Not on file     Forced sexual activity: Not on file   Other Topics Concern     Not on file   Social History Narrative     Not on file       Active Ambulatory Problems     Diagnosis Date Noted     Human Papilloma Virus Infection      Cerv Pap Smear (+) Atyp Squamous Cells Undetermined Signif      Cervical Dysplasia      Herpes Simplex Type II      Helicobacter Pylori (H. Pylori) Infection      Umbilical Hernia      Herpes Simplex Type I      Conductive Hearing Loss      Esophageal Reflux      Impaired Glucose Tolerance Test      Vitamin D deficiency 05/05/2016     Obesity (BMI 35.0-39.9) with comorbidity (H) 02/09/2021     Resolved Ambulatory Problems     Diagnosis Date Noted     Dizziness       "Abdominal Pain Around The Belly Button (Periumbilical)      Fatigue      Headache      External hemorrhoids      Hematuria      Pain During Urination (Dysuria)      Menorrhagia      Recent Weight Gain (___ Lbs)      Acute Serous Otitis Media Of The Left Ear      Amenorrhea      Pregnancy      Previous  section 2014     Status post repeat low transverse  section 2014     Abdominal pain 2016     Appendicitis 2016     Obesity (BMI 30.0-34.9)      Preoperative clearance      Finger infection 2017     Obesity (BMI 35.0-39.9) with comorbidity (H) 2021     Past Medical History:   Diagnosis Date     Abnormal Pap smear of cervix      Anemia      Herpes        Family History   Problem Relation Age of Onset     No Medical Problems Mother      No Medical Problems Father        Objective:     /72 (Patient Site: Left Arm, Patient Position: Sitting, Cuff Size: Adult Regular)   Pulse 69   Ht 5' 1.1\" (1.552 m)   Wt 186 lb 8 oz (84.6 kg)   SpO2 97%   BMI 35.12 kg/m      Patient is alert, in no obvious distress.   Skin: Warm, dry.  No lesions or rashes.  Skin turgor rapid return.   HEENT:  Head normocephalic, atraumatic.  Eyes normal.  PERRL.  EOM's intact.  No nystagmus. Right ear is normal.  Left ear has a small effusion present.  Nose patent, mucosa pink.  Oropharynx mucosa pink.  No lesions or tonsillar enlargement.   Neck: Supple, no lymphadenopathy, JVD, bruits noted.  No thyromegaly.  Lungs:  Clear to auscultation. Respirations even and unlabored.  No wheezing or rales noted.   Heart:  Regular rate and rhythm.  No murmurs, S3, S4, gallops, or rubs.    Abdomen: Soft, nontender.  No organomegaly. Bowel sounds normoactive. No guarding or masses noted.   Musculoskeletal:  Full ROM of extremities.  DTRs symmetrical, sensations intact.  No obvious deformity.  Muscle strength equal +5/5.   Neurological:  Cranial nerves 2-12 intact.  Doyle-Hallpike maneuver is positive on the " left.

## 2021-06-16 PROBLEM — E66.01 MORBID OBESITY (H): Status: ACTIVE | Noted: 2021-05-18

## 2021-06-17 NOTE — ED TRIAGE NOTES
Pt arrives with c/o dysphasia starting 15 min prior to calling ems.  Pt arrives via SP[facility].  Was playing with kids when it started was not eating.  Pt states another episode a few months ago of food stuck but resolved on own.  No cp.  Does have pressure in right ear and face for a week.  BS79

## 2021-06-17 NOTE — PROGRESS NOTES
Assessment and Plan:    1. Routine general medical examination at a health care facility  Recommend consuming a healthy diet and exercising.  She is not fasting today.  She will follow-up for fasting labs.  She is up-to-date on vaccinations.  She has received her Covid vaccine.  - Hemoglobin; Future    2. Encounter for hepatitis C screening test for low risk patient  - Hepatitis C Antibody (Anti-HCV); Future    3. Diabetes mellitus screening  - Glucose; Future    4. Lipid screening  - Lipid Cascade; Future    5. Encounter for screening mammogram for breast cancer  - Mammo Screening Bilateral; Future    6. OME (otitis media with effusion), right  Recommend Flonase to assist with this.  She plans on following up with ENT.  She is also going to see a dentist regarding possible TMJ pain and grinding of the teeth.    7. Morbid obesity (H)  Discussed weight loss goals.  The following high BMI interventions were performed this visit: encouragement to exercise and lifestyle education regarding diet      Subjective:     Toma is a 46 y.o. female presenting to the clinic for a female physical.     LMP: one month ago   Hx of abnormal pap smear: 12 ASCUS could not exclude a high grade squamous lesion.  Colposcopy performed   Last pap smear: 20 normal, negative HPV   Perform self-breast exams: yes  Vaginal discharge or irritation: none   Sexually active: yes,  x 14 years  Contraception: tubal ligation  Concerns for STDs: none   Previous pregnancies:three pregnancies,    All boys ages 14,10,6    Patient has been seeing ENT for eustachian tube dysfunction and vertigo. She has seen physical therapy.  Patient has had cerumen removed from the ear.  She has had thorough evaluation and there is concern regarding grinding of the teeth.  She plans on scheduling an appointment with a dentist.  She has been experiencing right ear fullness for 2 weeks.  She has been taking ibuprofen with minimal relief.  She feels  a popping sensation within the ear.    Review of systems:  I performed a 10 point review of systems.  All pertinent positives and negatives are noted in the HPI. All others are negative.     Allergies   Allergen Reactions     Adhesive Tape-Silicones Rash     Was seen in the hospital and got rash where adhesive-tape was placed        Current Outpatient Medications on File Prior to Visit   Medication Sig Dispense Refill     cholecalciferol, vitamin D3, (VITAMIN D3) 2,000 unit cap Take 1 capsule by mouth daily.       ibuprofen (ADVIL,MOTRIN) 200 MG tablet Take 400 mg by mouth every 6 (six) hours as needed for pain.       meclizine (ANTIVERT) 25 mg tablet Take 1 tablet (25 mg total) by mouth 3 (three) times a day as needed for dizziness or nausea. 30 tablet 0     No current facility-administered medications on file prior to visit.        Social History     Socioeconomic History     Marital status:      Spouse name: Not on file     Number of children: Not on file     Years of education: Not on file     Highest education level: Not on file   Occupational History     Not on file   Social Needs     Financial resource strain: Not on file     Food insecurity     Worry: Not on file     Inability: Not on file     Transportation needs     Medical: Not on file     Non-medical: Not on file   Tobacco Use     Smoking status: Never Smoker     Smokeless tobacco: Never Used   Substance and Sexual Activity     Alcohol use: No     Drug use: No     Sexual activity: Yes     Partners: Male   Lifestyle     Physical activity     Days per week: Not on file     Minutes per session: Not on file     Stress: Not on file   Relationships     Social connections     Talks on phone: Not on file     Gets together: Not on file     Attends Denominational service: Not on file     Active member of club or organization: Not on file     Attends meetings of clubs or organizations: Not on file     Relationship status: Not on file     Intimate partner violence      Fear of current or ex partner: Not on file     Emotionally abused: Not on file     Physically abused: Not on file     Forced sexual activity: Not on file   Other Topics Concern     Not on file   Social History Narrative     Not on file       Past Medical History:   Diagnosis Date     Abnormal Pap smear of cervix      Anemia      Herpes        Family History   Problem Relation Age of Onset     No Medical Problems Mother      No Medical Problems Father        Past Surgical History:   Procedure Laterality Date      SECTION       [place]  DELIVERY ONLY      Description:  Section;  Recorded: 2011;     [place] LAP,TUBAL CAUTERY Bilateral 2018    Procedure: LAPAROSCOPIC BILATERAL SALPINGECTOMY;  Surgeon: Kylah Rivas MD;  Location: [facility];  Service: Gynecology       Objective:     Vitals:    21 1228   BP: 92/64   Pulse: 63   SpO2: 98%       Patient is alert, no obvious distress.   Skin: Warm, dry.  No rashes or lesions. Skin turgor rapid return.   HEENT:  Eyes normal.  Left ear is normal.  Small effusion is present within the right ear.  Nose patent, mucosa pink.  Oropharynx mucosa pink, no lesions or tonsil enlargement.   Neck:  Supple, without lymphadenopathy, bruits, JVD. Thyroid normal texture and size.    Lungs:  Clear to auscultation.  No wheezing, rales noted.  Respirations even and unlabored.   Heart:  Regular rate and rhythm.  No murmurs.   Breasts:  Normal.  No surrounding adenopathy.   Abdomen: Soft, nontender.  No organomegaly.  Bowel sounds normoactive.  No guarding or masses noted.   :  deferred  Musculoskeletal:  Full ROM of extremities.  Muscle strength equal +5/5.   Neurological:  Cranial nerves 2-12 intact.

## 2021-06-17 NOTE — PROGRESS NOTES
Assessment and Plan:    1. Routine general medical examination at a health care facility  Discussed consuming a healthy diet and exercising.  Discussed importance routine sunscreen use.  Discussed adequate calcium and vitamin D intake.  She is due for mammogram.  She is up-to-date on vaccinations.  - Gynecologic Cytology (PAP Smear)  - Hemoglobin  - Mammo Screening Bilateral; Future    2. Diabetes mellitus screening  - Glucose    3. Lipid screening  - Lipid Cascade    4. Sterilization consult  Will refer to obgyn for further evaluation and discussion.   - Ambulatory referral to Obstetrics / Gynecology    5. Obesity  The following high BMI interventions were performed this visit: exercise promotion: strength training, exercise promotion: stretching and nutrition therapy        Subjective:     Toma is a 43 y.o. female presenting to the clinic for a female physical.     LMP: 3/30/18 irregular; every other month   Hx of abnormal pap smear: 12 ASCUS could not exclude a high grade squamous lesion.  Colposcopy performed   Last pap smear: 10/24/13 normal, negative HPV   Perform self-breast exams: yes  Vaginal discharge or irritation: none   Sexually active: yes,  x 10 years  Contraception: interested in tubal ligation   Concerns for STDs: none   Previous pregnancies:three pregnancies,    All boys ages 9, 7, 3     Patient is instructed interested in a tubal ligation.    Review of systems:  I performed a 10 point review of systems.  All pertinent positives and negatives are noted in the HPI. All others are negative.     No Known Allergies    Current Outpatient Prescriptions on File Prior to Visit   Medication Sig Dispense Refill     cholecalciferol, vitamin D3, (VITAMIN D3) 2,000 unit cap Take 1 capsule by mouth daily.       albuterol (PROAIR HFA;PROVENTIL HFA;VENTOLIN HFA) 90 mcg/actuation inhaler Inhale 2 puffs every 4 (four) hours as needed for wheezing. 1 Inhaler 0     traMADol (ULTRAM) 50 mg tablet  Take 1 tablet (50 mg total) by mouth every 6 (six) hours as needed for pain. 15 tablet 0     No current facility-administered medications on file prior to visit.        Social History     Social History     Marital status:      Spouse name: N/A     Number of children: N/A     Years of education: N/A     Occupational History     Not on file.     Social History Main Topics     Smoking status: Never Smoker     Smokeless tobacco: Never Used     Alcohol use No     Drug use: No     Sexual activity: Yes     Partners: Male     Other Topics Concern     Not on file     Social History Narrative       Past Medical History:   Diagnosis Date     Abnormal Pap smear of cervix      Anemia      Herpes        No family history on file.    Past Surgical History:   Procedure Laterality Date      SECTION       [place]  [place]      Description:  Section;  Recorded: 2011;       Objective:     Vitals:    18 1031   BP: (!) 86/64   Pulse: 60       Patient is alert, no obvious distress.   Skin: Warm, dry.  No rashes or lesions. Skin turgor rapid return.   HEENT:  Eyes normal.  Ears normal.  Nose patent, mucosa pink.  Oropharynx mucosa pink, no lesions or tonsil enlargement.   Neck:  Supple, without lymphadenopathy, bruits, JVD. Thyroid normal texture and size.    Lungs:  Clear to auscultation.  No wheezing, rales noted.  Respirations even and unlabored.   Heart:  Regular rate and rhythm.  No murmurs.   Breasts:  Normal.  No surrounding adenopathy.   Abdomen: Soft, nontender.  No organomegaly.  Bowel sounds normoactive.  No guarding or masses noted.   :  External genitalia normal.  Normal vaginal mucosa.  Cervix no lesions or cervical motion tenderness. Uterus normal size, no masses.  Adnexa no masses palpated bilaterally.   Musculoskeletal:  Full ROM of extremities.  Muscle strength equal +5/5.   Neurological:  Cranial nerves 2-12 intact.

## 2021-06-17 NOTE — TELEPHONE ENCOUNTER
----- Message from Yandy Shell CNP sent at 5/23/2021  5:06 PM CDT -----  Please notify the patient that her labs are normal except her hemoglobin is a little low.  I recommend she increase the iron in her diet or she start taking daily iron supplement.  Thanks.

## 2021-06-17 NOTE — ED PROVIDER NOTES
EMERGENCY DEPARTMENT ENCOUNTER     NAME: Toma White   AGE: 46 y.o. female   YOB: 1975   MRN: [id number]   EVALUATION DATE & TIME: 5/10/2021  2:58 PM   PCP: Yandy Shell CNP     Chief Complaint   Patient presents with     Dysphagia   :    FINAL IMPRESSION       1. Esophageal obstruction           ED COURSE & MEDICAL DECISION MAKING    3:02 PM I performed my initial history, physical exam, and discussed ED plan and course. I saw the patient wearing eye protection, N95, and gloves.    4:44 PM Patient able to drink water and feels improved after medications. Patient to be discharged home.     Pertinent Labs & Imaging studies reviewed. (See chart for details)   46 y.o. female  presents to the Emergency Department for evaluation of a feeling of esophageal obstruction and difficulty swallowing.  She states this is happened 1 time in the past. Initial Vitals Reviewed. Initial exam notable for early well-appearing patient who was frequently clearing her throat, holding an emesis bag stating she felt like she could not swallow her secretions.  Given the fact that this is happened in the past, but she has not required ED management, it is possible that there is something like an esophageal stricture or esophagitis causing esophageal food impactions.  I suspect that she has a partial obstruction today and there is no signs of airway involvement.  She was treated with easy gas and glucagon with resolution and is now tolerating oral intake.  At this time I think she is stable for discharge and I will encourage her to do a soft food diet and follow-up with GI for endoscopy.           At the conclusion of the encounter I discussed the results of all of the tests and the disposition. The questions were answered. The patient or family acknowledged understanding and was agreeable with the care plan.         MEDICATIONS GIVEN IN THE EMERGENCY:   Medications   sod bicarb-citric acid-simethicone packet 1 packet (E-Z  GAS) (1 packet Oral Given 5/10/21 2344)   glucagon (human recombinant) injection 1 mg (1 mg Intravenous Given 5/10/21 8109)      NEW PRESCRIPTIONS STARTED AT TODAY'S ER VISIT   Current Discharge Medication List      CONTINUE these medications which have NOT CHANGED    Details   cholecalciferol, vitamin D3, (VITAMIN D3) 2,000 unit cap Take 1 capsule by mouth daily.      ibuprofen (ADVIL,MOTRIN) 200 MG tablet Take 400 mg by mouth every 6 (six) hours as needed for pain.      meclizine (ANTIVERT) 25 mg tablet Take 1 tablet (25 mg total) by mouth 3 (three) times a day as needed for dizziness or nausea.  Qty: 30 tablet, Refills: 0    Associated Diagnoses: Dizziness            ================================================================   HISTORY OF PRESENT ILLNESS   Patient information was obtained from: Patient    Use of Intrepreter: N/A   Toma White is a 46 y.o. female with history of esophageal reflux, HPV who presents to the ED via EMS for evaluation of difficulty swallowing.  Patient reports sudden onset of difficulty swallowing that began 15 minutes prior to her calling EMS. She was playing with her children when it began, and was not eating. Patient states she feels as though something is stuck and when she tries to drink water, the water also feels like it gets stuck. She states she had an episode a few months ago where food got stuck in her throat but this resolved on its own. Patient also states she has had a right sided pressure headache for the last week. She has never had an endoscopy. Patient denies any other complaints at this time.  ================================================================    REVIEW OF SYSTEMS     Review of Systems   HENT: Positive for trouble swallowing.    Neurological: Positive for headaches (right sided).   All other systems reviewed and are negative.       PAST HISTORY     PAST MEDICAL HISTORY:   Past Medical History:   Diagnosis Date     Abnormal Pap smear of cervix       Anemia      Herpes       PAST SURGICAL HISTORY:   Past Surgical History:   Procedure Laterality Date      SECTION       [place]  DELIVERY [place]      Description:  Section;  Recorded: 2011;     [place] LAP,TUBAL CAUTERY Bilateral 2018    Procedure: LAPAROSCOPIC BILATERAL SALPINGECTOMY;  Surgeon: Kylah Rivas MD;  Location: [facility];  Service: Gynecology      CURRENT MEDICATIONS:   No current facility-administered medications on file prior to encounter.      Current Outpatient Medications on File Prior to Encounter   Medication Sig     cholecalciferol, vitamin D3, (VITAMIN D3) 2,000 unit cap Take 1 capsule by mouth daily.     ibuprofen (ADVIL,MOTRIN) 200 MG tablet Take 400 mg by mouth every 6 (six) hours as needed for pain.     meclizine (ANTIVERT) 25 mg tablet Take 1 tablet (25 mg total) by mouth 3 (three) times a day as needed for dizziness or nausea.      ALLERGIES:   No Known Allergies   FAMILY HISTORY:   Family History   Problem Relation Age of Onset     No Medical Problems Mother      No Medical Problems Father       SOCIAL HISTORY:   Social History     Socioeconomic History     Marital status:      Spouse name: None     Number of children: None     Years of education: None     Highest education level: None   Occupational History     None   Social Needs     Financial resource strain: None     Food insecurity     Worry: None     Inability: None     Transportation needs     Medical: None     Non-medical: None   Tobacco Use     Smoking status: Never Smoker     Smokeless tobacco: Never Used   Substance and Sexual Activity     Alcohol use: No     Drug use: No     Sexual activity: Yes     Partners: Male   Lifestyle     Physical activity     Days per week: None     Minutes per session: None     Stress: None   Relationships     Social connections     Talks on phone: None     Gets together: None     Attends Islam service: None     Active member of club or  organization: None     Attends meetings of clubs or organizations: None     Relationship status: None     Intimate partner violence     Fear of current or ex partner: None     Emotionally abused: None     Physically abused: None     Forced sexual activity: None   Other Topics Concern     None   Social History Narrative     None        VITALS  Patient Vitals for the past 24 hrs:   BP Temp Temp src Pulse Resp SpO2 Weight   05/10/21 1615 115/66 -- -- (!) 55 16 98 % --   05/10/21 1600 109/58 -- -- (!) 55 19 100 % --   05/10/21 1545 130/76 -- -- 70 21 100 % --   05/10/21 1530 129/64 -- -- 61 17 100 % --   05/10/21 1526 -- -- -- 61 20 96 % --   05/10/21 1515 124/60 -- -- 63 18 92 % --   05/10/21 1506 136/72 97.8  F (36.6  C) Oral 66 16 100 % 186 lb (84.4 kg)   05/10/21 1502 -- -- -- 79 20 100 % --   05/10/21 1501 136/72 -- -- -- -- -- --        ================================================================    PHYSICAL EXAM     VITAL SIGNS: /66   Pulse (!) 55   Temp 97.8  F (36.6  C) (Oral)   Resp 16   Wt 186 lb (84.4 kg)   LMP 05/02/2021   SpO2 98%   BMI 35.03 kg/m     Constitutional:  Awake, no acute distress   HENT:  Atraumatic, oropharynx without exudate or erythema, membranes moist. Holding empty emesis bag. Repeatedly clearing throat but able to tolerate secretions.   Lymph:  No adenopathy  Eyes: EOM intact, PERRL, no injection  Neck: Supple  Respiratory:  Clear to auscultation bilaterally, no wheezes or crackles   Cardiovascular:  Regular rate and rhythm, single S1 and S2   GI:  Soft, nontender, nondistended, no rebound or guarding   Musculoskeletal:  Moves all extremities, no lower extremity edema, no deformities    Skin:  Warm, dry  Neurologic:  Alert and oriented x3, no focal deficits noted       ================================================================  LAB       All pertinent labs reviewed and interpreted.   No results found for this visit on 05/10/21.      ===============================================================  RADIOLOGY       Reviewed all pertinent imaging. Please see official radiology report.   No results found.       ================================================================  EKG         I have independently reviewed and interpreted the EKG(s) documented above.     ================================================================  PROCEDURES         I, Phyllis Merritt, am serving as a scribe to document services personally performed by Dr. Hernandez based on my observation and the provider's statements to me. I, Alexsandra Hernnadez MD attest that Phyllis Merritt is acting in a scribe capacity, has observed my performance of the services and has documented them in accordance with my direction.   Alexsandra Hernandez M.D.   Emergency Medicine   [facility] EMERGENCY DEPARTMENT  [address]  [facility] [address]  Dept: [phone]  Loc: [phone]      Alexsandra Hernandez MD  05/10/21 9045

## 2021-06-18 NOTE — ANESTHESIA CARE TRANSFER NOTE
Last vitals:   Vitals:    06/20/18 1559   BP: (P) 94/53   Pulse: (P) 61   Resp: (P) 18   Temp: (P) 36.7  C (98.1  F)   SpO2: (P) 100%     Patient's level of consciousness is drowsy  Spontaneous respirations: yes  Maintains airway independently: yes  Dentition unchanged: yes  Oropharynx: oropharynx clear of all foreign objects    QCDR Measures:  ASA# 20 - Surgical Safety Checklist: WHO surgical safety checklist completed prior to induction  PQRS# 430 - Adult PONV Prevention: 4558F - Pt received => 2 anti-emetic agents (different classes) preop & intraop  ASA# 8 - Peds PONV Prevention: NA - Not pediatric patient, not GA or 2 or more risk factors NOT present  PQRS# 424 - Malorie-op Temp Management: 4559F - At least one body temp DOCUMENTED => 35.5C or 95.9F within required timeframe  PQRS# 426 - PACU Transfer Protocol: - Transfer of care checklist used  ASA# 14 - Acute Post-op Pain: ASA14B - Patient did NOT experience pain >= 7 out of 10

## 2021-06-18 NOTE — PATIENT INSTRUCTIONS - HE
"Patient Instructions by Denita Dubois PT at 2/18/2021  8:30 AM     Author: Denita Dubois PT Service: -- Author Type: Physical Therapist    Filed: 2/18/2021  9:28 AM Encounter Date: 2/18/2021 Status: Signed    : Denita Dubois PT (Physical Therapist)       INSTRUCTIONS FOR PATIENTS AFTER OFFICE TREATMENTS (Epley or Semont maneuvers)    1. Wait for 10 minutes after the maneuver is performed before going home. This is to avoid \"quick spins,\" or brief bursts of vertigo as debris repositions itself immediately after the maneuver. Don't drive yourself home.    2. Sleep semi-recumbent for the next night.     This means sleep with your head intermediate between being flat and upright (a 45 degree angle). This is most easily done by using a recliner chair or by using pillows arranged on a couch (see figure 3). During the day, try to keep your head vertical. You must not go to the hairdresser or dentist. No exercise which requires head movement. When men shave under their chins, they should bend their bodies forward in order to keep their head vertical. If eye drops are required, try to put them in without tilting the head back. Shampoo only under the shower. Some authors suggest that no special sleeping positions are necessary (Ridge, 2004; Rohit and Samara, 1996). We, as do others, think that there is some value (Isa et al, 2006)    3. For at least one week, avoid provoking head positions that might bring BPPV on again.    Use two pillows when you sleep.  Avoid sleeping on the \"bad\" side.  Don't turn your head far up or far down.      Be careful to avoid head-extended position, in which you are lying on your back, especially with your head turned towards the affected side. This means be cautious at the [facility], dentist's office, and while undergoing minor surgery. Try to stay as upright as possible. Exercises for low-back pain should be stopped for a week. No \"sit-ups\" should be done for at least " "one week and no \"crawl\" swimming. (Breast stroke is OK.) Also avoid far head-forward positions such as might occur in certain exercises (i.e. touching the toes). Do not start doing the Ahumada-Daroff exercises immediately or 2 days after the Epley or Semont maneuver, unless specifically instructed otherwise by your health care provider.         "

## 2021-06-18 NOTE — ANESTHESIA POSTPROCEDURE EVALUATION
Patient: Toma White  LAPAROSCOPIC BILATERAL SALPINGECTOMY  Anesthesia type: general    Patient location: PACU  Last vitals:   Vitals:    06/20/18 1700   BP: 97/54   Pulse: 66   Resp: 16   Temp:    SpO2: 100%     Post vital signs: stable  Level of consciousness: awake and responds to simple questions  Post-anesthesia pain: pain controlled  Post-anesthesia nausea and vomiting: no  Pulmonary: unassisted, return to baseline  Cardiovascular: stable and blood pressure at baseline  Hydration: adequate  Anesthetic events: no    QCDR Measures:  ASA# 11 - Malorie-op Cardiac Arrest: ASA11B - Patient did NOT experience unanticipated cardiac arrest  ASA# 12 - Malorie-op Mortality Rate: ASA12B - Patient did NOT die  ASA# 13 - PACU Re-Intubation Rate: ASA13B - Patient did NOT require a new airway mgmt  ASA# 10 - Composite Anes Safety: ASA10A - No serious adverse event    Additional Notes: pain better with additional medications, resting comfortably between interventions.

## 2021-06-19 NOTE — LETTER
Letter by Yandy Shell CNP at      Author: Yandy Shell CNP Service: -- Author Type: --    Filed:  Encounter Date: 8/18/2019 Status: (Other)         Toma White  [address] [facility] [address]             August 18, 2019         Dear Ms. White,    Below are the results from your recent visit:    Resulted Orders   Lipid Cascade   Result Value Ref Range    Cholesterol 120 <=199 mg/dL    Triglycerides 53 <=149 mg/dL    HDL Cholesterol 40 (L) >=50 mg/dL    LDL Calculated 69 <=129 mg/dL    Patient Fasting > 8hrs? Yes    HM2(CBC w/o Differential)   Result Value Ref Range    WBC 5.3 4.0 - 11.0 thou/uL    RBC 4.44 3.80 - 5.40 mill/uL    Hemoglobin 12.2 12.0 - 16.0 g/dL    Hematocrit 37.2 35.0 - 47.0 %    MCV 84 80 - 100 fL    MCH 27.4 27.0 - 34.0 pg    MCHC 32.7 32.0 - 36.0 g/dL    RDW 12.5 11.0 - 14.5 %    Platelets 168 140 - 440 thou/uL    MPV 9.5 7.0 - 10.0 fL   Comprehensive Metabolic Panel   Result Value Ref Range    Sodium 141 136 - 145 mmol/L    Potassium 4.0 3.5 - 5.0 mmol/L    Chloride 107 98 - 107 mmol/L    CO2 25 22 - 31 mmol/L    Anion Gap, Calculation 9 5 - 18 mmol/L    Glucose 85 70 - 125 mg/dL    BUN 19 8 - 22 mg/dL    Creatinine 0.71 0.60 - 1.10 mg/dL    GFR MDRD Af Amer >60 >60 mL/min/1.73m2    GFR MDRD Non Af Amer >60 >60 mL/min/1.73m2    Bilirubin, Total 0.5 0.0 - 1.0 mg/dL    Calcium 9.2 8.5 - 10.5 mg/dL    Protein, Total 7.0 6.0 - 8.0 g/dL    Albumin 3.9 3.5 - 5.0 g/dL    Alkaline Phosphatase 59 45 - 120 U/L    AST 19 0 - 40 U/L    ALT 16 0 - 45 U/L    Narrative    Fasting Glucose reference range is 70-99 mg/dL per  American Diabetes Association (ADA) guidelines.   Vitamin D, Total (25-Hydroxy)   Result Value Ref Range    Vitamin D, Total (25-Hydroxy) 32.2 30.0 - 80.0 ng/mL    Narrative    Deficiency <10.0 ng/mL  Insufficiency 10.0-29.9 ng/mL  Sufficiency 30.0-80.0 ng/mL  Toxicity (possible) >100.0 ng/mL   Vitamin B12   Result Value Ref Range    Vitamin B-12 783 213 - 816 pg/mL    Thyroid Cascade   Result Value Ref Range    TSH 1.11 0.30 - 5.00 uIU/mL   Erythrocyte Sedimentation Rate   Result Value Ref Range    Sed Rate 8 0 - 20 mm/hr   Lyme Antibody Cascade   Result Value Ref Range    Lyme Antibody Cascade 0.08 <0.90 Index Value    Narrative    Interpretation of Lyme Disease Total Antibody (IgG/IgM)  <0.90 Test Value=Negative  No detectable antibodies to B. burgdorferi. Patients  in early stages of infection may not produce  detectable levels of antibody. Antibiotic therapy  in early disease may prevent antibody production  from reaching detectable levels. Patients with  clinical history and/or symptoms suggestive of Lyme  disease but with negative test results should be  retested in 2-4 weeks.  0.90-<1.10 Test Value=Borderline  Suggests the presence of antibodies to B.  burgdorferi. Recommend repeat collection in 2-4  weeks.  >=1.10 Test Value=Positive  Indicates the presence of antibodies to B.  burgdorferi. False positive results can occur with  sera from syphilis patients. Cross-reactivity may  occur with relapsing fever, [place] Spotted  fever, other spirochetal diseases, erythematosus,  EBV infection, or CMV infection. Clinical symptoms,  epidemiology of the case and other laboratory tests  should allow for distinction of these conditions from  Lyme disease.   Glycosylated Hemoglobin A1c   Result Value Ref Range    Hemoglobin A1c 5.5 3.5 - 6.0 %       Your lab results are normal.  If your symptoms return, please follow-up.      Please call with questions or contact us using [employer].    Sincerely,        Electronically signed by Yandy Shell CNP

## 2021-06-21 NOTE — LETTER
Letter by Yanyd Shell CNP at      Author: Yandy Shell CNP Service: -- Author Type: --    Filed:  Encounter Date: 2/16/2021 Status: (Other)         Toma White  [address] [facility] [address]             February 16, 2021         Dear Ms. White,    Below are the results from your recent visit:    Resulted Orders   HM2(CBC w/o Differential)   Result Value Ref Range    WBC 6.3 4.0 - 11.0 thou/uL    RBC 4.60 3.80 - 5.40 mill/uL    Hemoglobin 12.4 12.0 - 16.0 g/dL    Hematocrit 38.9 35.0 - 47.0 %    MCV 85 80 - 100 fL    MCH 27.0 27.0 - 34.0 pg    MCHC 31.9 (L) 32.0 - 36.0 g/dL    RDW 13.5 11.0 - 14.5 %    Platelets 198 140 - 440 thou/uL    MPV 10.8 (H) 7.0 - 10.0 fL   Basic Metabolic Panel   Result Value Ref Range    Sodium 141 136 - 145 mmol/L    Potassium 4.4 3.5 - 5.0 mmol/L    Chloride 108 (H) 98 - 107 mmol/L    CO2 27 22 - 31 mmol/L    Anion Gap, Calculation 6 5 - 18 mmol/L    Glucose 113 70 - 125 mg/dL    Calcium 8.9 8.5 - 10.5 mg/dL    BUN 12 8 - 22 mg/dL    Creatinine 0.71 0.60 - 1.10 mg/dL    GFR MDRD Af Amer >60 >60 mL/min/1.73m2    GFR MDRD Non Af Amer >60 >60 mL/min/1.73m2    Narrative    Fasting Glucose reference range is 70-99 mg/dL per  American Diabetes Association (ADA) guidelines.   Thyroid Cascade   Result Value Ref Range    TSH 1.20 0.30 - 5.00 uIU/mL   Glycosylated Hemoglobin A1c   Result Value Ref Range    Hemoglobin A1c 5.4 <=5.6 %       Your A1C is normal.  You do not have diabetes.     Your TSH (thyroid lab) is normal.     Your kidney function is normal.      Your blood counts show you may have low iron in your diet.  I recommend increasing your iron intake.      Please call with questions or contact us using [employer].    Sincerely,        Electronically signed by Yandy Shell CNP

## 2021-06-24 NOTE — PATIENT INSTRUCTIONS - HE
Traction alopecia is preventable.   ?Hairstyles should be painless; a painful hairstyle should be removed immediately.  ?Hairstyles causing skin redness, pimples, or hair loss should be removed immediately.  ?Traction hairstyles (eg, heather or weaves) should be worn infrequently and for short periods.  ?It is preferable to avoid traction hairstyles on chemically relaxed hair.  ?Massaging or vigorously brushing the affected scalp does not stimulate hair regrowth and is not recommended for traction alopecia.

## 2021-06-24 NOTE — TELEPHONE ENCOUNTER
Pt calling  She is having a problem with hair loss for the past month.  Patch of hair is missing on her scalp.  She has also noticed scalp tenderness for the past few months.  Pt's sister looked at her scalp the other day and noticed some scabs on her scalp.  Pt requesting to schedule an appt @ [facility] clinic which is near her home.    PLAN  Will transfer to scheduling to schedule an appt to be seen @ [facility].  Call back if any further questions or concerns before she is seen in clinic.  Lisa Dennis, RN, [facility] RN Triage/Med Refills    Reason for Disposition    Scabs or crusts are present in the hair    Protocols used: HAIR LOSS-A-AH

## 2021-06-24 NOTE — PROGRESS NOTES
[facility] Clinic Note    Toma White  1975   [id number]    Toma White is a 43 y.o. female presenting to discuss the following:     CC:   Chief Complaint   Patient presents with     Hair/Scalp Problem     Hair loss, tenderness and scabbing       HPI:  Toma reports issue of scalp pain and slight thinning of hair.  She noticed this a few weeks ago when she had tight heather in place.  She was braiding her hair tightly, replacing at least every other week. Has also worn extensions in the past. She trimmed her hair short to see if this would help the problem. Scalp was tender along the heather, improved now. No large bald patches.    She also notes a small bump posterior scalp.     ROS:   See HPI above.     PMH:   Patient Active Problem List   Diagnosis     Human Papilloma Virus Infection     Cerv Pap Smear (+) Atyp Squamous Cells Undetermined Signif     Cervical Dysplasia     Herpes Simplex Type II     Helicobacter Pylori (H. Pylori) Infection     Umbilical Hernia     Herpes Simplex Type I     Conductive Hearing Loss     Esophageal Reflux     Impaired Glucose Tolerance Test     Previous  section     Status post repeat low transverse  section     Vitamin D deficiency     Obesity (BMI 30.0-34.9)       Past Medical History:   Diagnosis Date     Abnormal Pap smear of cervix      Anemia      Herpes        PSH:   Past Surgical History:   Procedure Laterality Date      SECTION       IN  DELIVERY ONLY      Description:  Section;  Recorded: 2011;     IN LAP,TUBAL CAUTERY Bilateral 2018    Procedure: LAPAROSCOPIC BILATERAL SALPINGECTOMY;  Surgeon: Kylah Rivas MD;  Location: [facility];  Service: Gynecology         MEDICATIONS:   Current Outpatient Medications on File Prior to Visit   Medication Sig Dispense Refill     cholecalciferol, vitamin D3, (VITAMIN D3) 2,000 unit cap Take 1 capsule by mouth daily.       ibuprofen (ADVIL,MOTRIN) 200  "MG tablet Take 400 mg by mouth every 6 (six) hours as needed for pain.       oxyCODONE-acetaminophen (PERCOCET) 5-325 mg per tablet Take 1 tablet by mouth every 4 (four) hours as needed for pain. 20 tablet 0     No current facility-administered medications on file prior to visit.        ALLERGIES:  No Known Allergies    PHYSICAL EXAM:   /64   Pulse 68   Temp 98.3  F (36.8  C) (Oral)   Resp 16   Ht 5' 2\" (1.575 m)   Wt 149 lb (67.6 kg)   BMI 27.25 kg/m     GENERAL: Toma is a pleasant, well appearing black female, appears stated age.   HEENT: Hair is trimmed short. There is slight thinning of hair posterior crown of head without any bald patches. There is also a dark brown papule approximately 2-3mm in diameter with regular borders and uniform color, mildly tender to palpation without purulence in occipital region of scalp.  HEART: Regular rate and rhythm, no murmurs.  LUNGS: Clear to auscultation bilaterally, unlabored.     ASSESSMENT & PLAN:   Toma White is a 43 y.o. female presenting for evaluation of hair loss and bump on scalp.    1. Traction alopecia  Toma appears to have a mild version of traction alopecia. She does not have diffuse loss, patterned hair loss, or patches of loss. She recently has trimmed her hair very short. We discussed etiology of traction alopecia, prevention strategies to avoid further hair loss, and excellent prognosis. I do not think steroid injections or minoxidil is indicated at this time.     If symptoms are progressing, she should return for further evaluation. Consider testing of thyroid level, possible biopsy, and possible referral to dermatology of progressing.     2. Folliculitis  - bacitracin 500 unit/gram ointment; Apply topically 3 (three) times a day for 7 days. Apply to affected area daily  Dispense: 30 g; Refill: 0    Small papule appears most consistent with a benign lesion (cherry hemangioma vs benign nevus), however with focal pain and traction alopecia, " may be small infection. Recommended trial of bacitracin. If not improving, would plan to leave alone unless changing, then should return for further evaluation.     3. Immunization due  - Influenza, Seasonal Quad, Preservative Free 36+ Months     RTC:    Brittaney Reeves DO

## 2021-07-06 ENCOUNTER — HOSPITAL ENCOUNTER (OUTPATIENT)
Dept: MAMMOGRAPHY | Facility: CLINIC | Age: 46
Discharge: HOME | End: 2021-07-06
Attending: NURSE PRACTITIONER
Payer: COMMERCIAL

## 2021-07-06 DIAGNOSIS — Z12.31 ENCOUNTER FOR SCREENING MAMMOGRAM FOR BREAST CANCER: ICD-10-CM

## 2021-07-16 ENCOUNTER — TRANSFERRED RECORDS (OUTPATIENT)
Dept: HEALTH INFORMATION MANAGEMENT | Facility: CLINIC | Age: 46
End: 2021-07-16

## 2021-07-28 ENCOUNTER — TRANSFERRED RECORDS (OUTPATIENT)
Dept: HEALTH INFORMATION MANAGEMENT | Facility: CLINIC | Age: 46
End: 2021-07-28

## 2021-08-03 PROBLEM — E66.01 MORBID OBESITY (H): Status: RESOLVED | Noted: 2021-02-09 | Resolved: 2021-05-18

## 2021-08-03 PROBLEM — E66.01 MORBID OBESITY (H): Status: RESOLVED | Noted: 2021-02-09 | Resolved: 2021-02-09

## 2021-10-06 ENCOUNTER — OFFICE VISIT (OUTPATIENT)
Dept: FAMILY MEDICINE | Facility: CLINIC | Age: 46
End: 2021-10-06
Payer: COMMERCIAL

## 2021-10-06 VITALS
HEIGHT: 62 IN | SYSTOLIC BLOOD PRESSURE: 110 MMHG | TEMPERATURE: 97.3 F | RESPIRATION RATE: 16 BRPM | BODY MASS INDEX: 34.04 KG/M2 | OXYGEN SATURATION: 97 % | DIASTOLIC BLOOD PRESSURE: 68 MMHG | WEIGHT: 185 LBS | HEART RATE: 54 BPM

## 2021-10-06 DIAGNOSIS — H69.91 DYSFUNCTION OF RIGHT EUSTACHIAN TUBE: Primary | ICD-10-CM

## 2021-10-06 PROCEDURE — 99213 OFFICE O/P EST LOW 20 MIN: CPT | Performed by: NURSE PRACTITIONER

## 2021-10-06 RX ORDER — FLUTICASONE PROPIONATE 50 MCG
1 SPRAY, SUSPENSION (ML) NASAL DAILY
Qty: 16 G | Refills: 11 | Status: SHIPPED | OUTPATIENT
Start: 2021-10-06 | End: 2022-10-31

## 2021-10-06 RX ORDER — CETIRIZINE HYDROCHLORIDE 10 MG/1
10 TABLET ORAL DAILY
Qty: 90 TABLET | Refills: 3 | Status: SHIPPED | OUTPATIENT
Start: 2021-10-06 | End: 2022-10-31

## 2021-10-06 ASSESSMENT — ENCOUNTER SYMPTOMS
GASTROINTESTINAL NEGATIVE: 1
SINUS PRESSURE: 1
RHINORRHEA: 0
COUGH: 0
APPETITE CHANGE: 0
WHEEZING: 0
CONSTITUTIONAL NEGATIVE: 1
HEADACHES: 0
SORE THROAT: 0

## 2021-10-06 ASSESSMENT — MIFFLIN-ST. JEOR: SCORE: 1442.4

## 2021-10-06 NOTE — PROGRESS NOTES
"    Assessment & Plan     Dysfunction of right eustachian tube  Symptoms consistent with eustachian tube dysfunction. No signs of infection or worrisome concerns. Prescribed flonase and zyrtec. Side effects, risks and benefits of medication were discussed with patient. Discussed how and when to take medication. Recommended she take them year around given her ongoing symptoms.     - fluticasone (FLONASE) 50 MCG/ACT nasal spray; Spray 1 spray into both nostrils daily  - cetirizine (ZYRTEC) 10 MG tablet; Take 1 tablet (10 mg) by mouth daily             BMI:   Estimated body mass index is 33.84 kg/m  as calculated from the following:    Height as of this encounter: 1.575 m (5' 2\").    Weight as of this encounter: 83.9 kg (185 lb).       Patient Instructions   Eustachian Tube Dysfunction (tube that helps drain your sinuses is inflamed):   1. Use Flonase daily in both nostrils.   2. Take Zyrtec daily to help with drying up drainage. Take it at night if it causes drowsiness.  3. Humidification and hydration will help.         Return if symptoms worsen or fail to improve.    YURIDIA Miller CNP  [facility] SABINA Chua is a 46 year old who presents for the following health issues     HPI     Acute Illness  Acute illness concerns: R ear pain and crackling   Onset/Duration: ongoing  Symptoms:  Fever: no  Chills/Sweats: no  Headache (location?): no  Sinus Pressure: YES  Conjunctivitis:  no  Ear Pain: YES: right  Rhinorrhea: no  Congestion: no  Sore Throat: no  Cough: no  Wheeze: no  Decreased Appetite: no  Nausea: no  Vomiting: no  Diarrhea: no  Dysuria/Freq.: no  Dysuria or Hematuria: no  Fatigue/Achiness: no  Sick/Strep Exposure: no  Therapies tried and outcome: None    Additional provider notes: Right ear pain and right sinus pressure on and off since February. States she was having balance problems/dizziness early in the year. She had cerumen removed and dizziness went away. She then went " "to ENT and was given ibuprofen for symptoms. Late spring she was seen diagnosed with dysfunction of eustachian tubes and was started on flonase and Zyrtec. States she is out of flonase and hasn't been taking either.     Review of Systems   Constitutional: Negative.  Negative for appetite change.   HENT: Positive for ear pain (right) and sinus pressure. Negative for congestion, rhinorrhea and sore throat.    Respiratory: Negative for cough and wheezing.    Gastrointestinal: Negative.    Genitourinary: Negative.    Neurological: Negative for headaches.            Objective    /68 (BP Location: Right arm, Patient Position: Sitting, Cuff Size: Adult Regular)   Pulse 54   Temp 97.3  F (36.3  C) (Tympanic)   Resp 16   Ht 1.575 m (5' 2\")   Wt 83.9 kg (185 lb)   LMP 06/16/2021 (Approximate)   SpO2 97%   Breastfeeding No   BMI 33.84 kg/m    Body mass index is 33.84 kg/m .  Physical Exam  Vitals and nursing note reviewed.   Constitutional:       General: She is not in acute distress.     Appearance: Normal appearance. She is not ill-appearing or toxic-appearing.   HENT:      Head: Normocephalic and atraumatic.      Right Ear: Ear canal and external ear normal. There is no impacted cerumen. Tympanic membrane is bulging.      Left Ear: Tympanic membrane, ear canal and external ear normal. There is no impacted cerumen.      Mouth/Throat:      Pharynx: Posterior oropharyngeal erythema (posterior) present.   Cardiovascular:      Rate and Rhythm: Normal rate and regular rhythm.      Pulses: Normal pulses.      Heart sounds: Normal heart sounds.   Pulmonary:      Effort: Pulmonary effort is normal.      Breath sounds: Normal breath sounds.   Musculoskeletal:      Cervical back: Normal range of motion and neck supple. No tenderness.   Lymphadenopathy:      Cervical: No cervical adenopathy.   Skin:     General: Skin is warm and dry.   Neurological:      Mental Status: She is alert and oriented to person, place, and " time.   Psychiatric:         Behavior: Behavior normal.

## 2021-10-06 NOTE — PATIENT INSTRUCTIONS
Eustachian Tube Dysfunction (tube that helps drain your sinuses is inflamed):   1. Use Flonase daily in both nostrils.   2. Take Zyrtec daily to help with drying up drainage. Take it at night if it causes drowsiness.  3. Humidification and hydration will help.

## 2021-10-14 ENCOUNTER — OFFICE VISIT (OUTPATIENT)
Dept: NEUROLOGY | Facility: CLINIC | Age: 46
End: 2021-10-14
Payer: COMMERCIAL

## 2021-10-14 ENCOUNTER — LAB (OUTPATIENT)
Dept: LAB | Facility: HOSPITAL | Age: 46
End: 2021-10-14
Payer: COMMERCIAL

## 2021-10-14 VITALS
DIASTOLIC BLOOD PRESSURE: 80 MMHG | WEIGHT: 185 LBS | BODY MASS INDEX: 34.04 KG/M2 | SYSTOLIC BLOOD PRESSURE: 122 MMHG | HEART RATE: 57 BPM | HEIGHT: 62 IN

## 2021-10-14 DIAGNOSIS — R26.81 UNSTEADINESS: Primary | ICD-10-CM

## 2021-10-14 DIAGNOSIS — R26.81 UNSTEADINESS: ICD-10-CM

## 2021-10-14 LAB
ALBUMIN SERPL-MCNC: 4 G/DL (ref 3.5–5)
ALP SERPL-CCNC: 65 U/L (ref 45–120)
ALT SERPL W P-5'-P-CCNC: 26 U/L (ref 0–45)
ANION GAP SERPL CALCULATED.3IONS-SCNC: 7 MMOL/L (ref 5–18)
AST SERPL W P-5'-P-CCNC: 26 U/L (ref 0–40)
BILIRUB SERPL-MCNC: 0.7 MG/DL (ref 0–1)
BUN SERPL-MCNC: 12 MG/DL (ref 8–22)
CALCIUM SERPL-MCNC: 9.7 MG/DL (ref 8.5–10.5)
CHLORIDE BLD-SCNC: 107 MMOL/L (ref 98–107)
CK SERPL-CCNC: 272 U/L (ref 30–190)
CO2 SERPL-SCNC: 27 MMOL/L (ref 22–31)
CREAT SERPL-MCNC: 0.77 MG/DL (ref 0.6–1.1)
GFR SERPL CREATININE-BSD FRML MDRD: >90 ML/MIN/1.73M2
GLUCOSE BLD-MCNC: 92 MG/DL (ref 70–125)
HBA1C MFR BLD: 5.3 %
POTASSIUM BLD-SCNC: 3.8 MMOL/L (ref 3.5–5)
PROT SERPL-MCNC: 7.5 G/DL (ref 6–8)
SODIUM SERPL-SCNC: 141 MMOL/L (ref 136–145)
TSH SERPL DL<=0.005 MIU/L-ACNC: 0.95 UIU/ML (ref 0.3–5)
VIT B12 SERPL-MCNC: 639 PG/ML (ref 213–816)

## 2021-10-14 PROCEDURE — 36415 COLL VENOUS BLD VENIPUNCTURE: CPT

## 2021-10-14 PROCEDURE — 82550 ASSAY OF CK (CPK): CPT

## 2021-10-14 PROCEDURE — 99205 OFFICE O/P NEW HI 60 MIN: CPT | Performed by: PSYCHIATRY & NEUROLOGY

## 2021-10-14 PROCEDURE — 83036 HEMOGLOBIN GLYCOSYLATED A1C: CPT

## 2021-10-14 PROCEDURE — 84443 ASSAY THYROID STIM HORMONE: CPT

## 2021-10-14 PROCEDURE — 82607 VITAMIN B-12: CPT

## 2021-10-14 PROCEDURE — 82040 ASSAY OF SERUM ALBUMIN: CPT

## 2021-10-14 PROCEDURE — 84165 PROTEIN E-PHORESIS SERUM: CPT | Mod: 26 | Performed by: PATHOLOGY

## 2021-10-14 PROCEDURE — 84165 PROTEIN E-PHORESIS SERUM: CPT | Mod: TC

## 2021-10-14 PROCEDURE — 84155 ASSAY OF PROTEIN SERUM: CPT

## 2021-10-14 ASSESSMENT — MIFFLIN-ST. JEOR: SCORE: 1442.4

## 2021-10-14 NOTE — LETTER
10/14/2021         RE: Toma White  [address]  [place] [facility] [address]        Dear Colleague,    Thank you for referring your patient, Toma White, to the [facility] [place]. Please see a copy of my visit note below.    NEUROLOGY OUTPATIENT CONSULT NOTE   Oct 14, 2021     CHIEF COMPLAINT/REASON FOR VISIT/REASON FOR CONSULT  Patient presents with:  Dizziness: First episode in 2018, second episode early this year     REASON FOR CONSULTATION- Dizziness    REFERRAL SOURCE  Dr. Colon  CC Dr. Colon    HISTORY OF PRESENT ILLNESS  Toma White is a 46 year old female with episodes of dizziness.  The first episode happened in 2018.  Lasted for 24 hours improved on its own.  She was standing and washing dishes when she felt that she was falling over.  She reports that her knees were not steady.  She did not have any dizziness sitting down.  Describes it more as unsteadiness.  Denies no ringing in the ears or any headaches with the spell.    She remained symptom-free till 2021.  She had another episode in March.  There were no provoking factors.  She did try physical therapy/Fitz-Hallpike which did not help.  She was given some medications for suspected eustachian tube dysfunction which did not help.  This was Flonase and Zyrtec.  She denies any ringing in the ears or any pressure in the ears.  Currently is asymptomatic.  She is not dizzy sitting in the chair only when she was trying to stand up.  Denies any lightheadedness or any tunnel vision.    Previous history is reviewed and this is unchanged.    PAST MEDICAL/SURGICAL HISTORY  Past Medical History:   Diagnosis Date     Abnormal Pap smear of cervix      Anemia      Herpes      Patient Active Problem List   Diagnosis     Human Papilloma Virus Infection     Cerv Pap Smear (+) Atyp Squamous Cells Undetermined Signif     Cervical Dysplasia     Herpes Simplex Type II     Helicobacter Pylori (H. Pylori) Infection      "Umbilical Hernia     Herpes Simplex Type I     Conductive Hearing Loss     Esophageal Reflux     Impaired Glucose Tolerance Test     Vitamin D deficiency     Obesity (BMI 35.0-39.9) with comorbidity (H)   Reviewed and otherwise noncontributory    FAMILY HISTORY  Family History   Problem Relation Age of Onset     No Known Problems Mother      No Known Problems Father    Positive for diabetes and seizure in her son.    SOCIAL HISTORY  Social History     Tobacco Use     Smoking status: Never Smoker     Smokeless tobacco: Never Used   Substance Use Topics     Alcohol use: No     Drug use: No       SYSTEMS REVIEW  Twelve-system ROS was done and other than the HPI this was negative except for dizziness, balance coordination problems, knee pain, weight gain/loss, snoring loudly    MEDICATIONS  azelastine (ASTELIN) 0.1 % nasal spray, Spray 1 spray into both nostrils 2 times daily  cetirizine (ZYRTEC) 10 MG tablet, Take 1 tablet (10 mg) by mouth daily  fluticasone (FLONASE) 50 MCG/ACT nasal spray, Spray 1 spray into both nostrils daily    No current facility-administered medications on file prior to visit.       PHYSICAL EXAMINATION  VITALS: /80 (BP Location: Right arm, Patient Position: Sitting)   Pulse 57   Ht 1.575 m (5' 2\")   Wt 83.9 kg (185 lb)   BMI 33.84 kg/m    GENERAL: Healthy appearing, alert, no acute distress, normal habitus.  CARDIOVASCULAR: Extremities warm and well perfused. Pulses present.   EYES: Funduscopic exam limited.  NEUROLOGICAL:  Patient is awake and oriented to self, place and time.  Attention span is normal.  Memory is grossly intact.  Language is fluent and follows commands appropriately.  Appropriate fund of knowledge. Cranial nerves 2-12 are intact. There is no pronator drift.  Motor exam shows 5/5 strength in all extremities.  Tone is symmetric bilaterally in upper and lower extremities.  Reflexes are symmetric and 2+ in upper extremities and lower extremities. Sensory exam is grossly " intact to light touch, pin prick and vibration.  Finger to nose and heel to shin is without dysmetria.  Romberg is negative.  Gait is normal and the patient is able to do tandem walk and walk on toes and heels.      DIAGNOSTICS  MRI  MRI brain 2018 normal per patient. Report not available to me.     OUTSIDE RECORDS  Outside referral notes and chart notes were reviewed and pertinent information has been summarized (in addition to the HPI):-She was seen in family practice. Had eustachian tube dysfunction. No signs of infection or worrisome concerns. Prescribed Flonase and Zyrtec. This was 2 weeks ago.    IMPRESSION/REPORT/PLAN  Unsteadiness-episodic    This is a 46 year old female with episodic unsteadiness.  Her description of these episodes is not completely clear and she mainly describes unsteadiness where she feels that she is going to fall but no clear vertigo.  Symptoms are not present when she is sitting down.  Episodes only last for 24 hours and then self-limited.  MRI in 2018 was reportedly normal.  I do not have the actual copy of the MRI.  Physical therapy has not helped though currently she is asymptomatic with resolution of symptoms.  Possibilities could include drop attacks, periodic paralysis, seizures.  I will check blood work to look for causes of the spells.  We will also check a EEG.  Could consider repeating the MRI of the brain if no clear cause is identified versus obtaining the previous MRI.  I can see her back in 1 month.    -     EEG Routine; Future  -     Vitamin B12; Future  -     TSH with free T4 reflex; Future  -     Hemoglobin A1c; Future  -     Protein electrophoresis; Future  -     CK total; Future  -     Comprehensive metabolic panel; Future    Return in about 1 month (around 11/14/2021) for In-Clinic Visit, After testing.    Over 64 minutes were spent coordinating the care for the patient on the day of the encounter.  This includes previsit, during visit and post visit activities as  documented above.  Reviewing chart.  Counseling patient.  Trying to find old MRI.  (Activities include but not inclusive of reviewing chart, reviewing outside records, reviewing labs and imaging study results as well as the images, patient visit time including getting history and exam,  use if applicable, review of test results with the patient and coming up with a plan in a shared model, counseling patient and family, education and answering patient questions, EMR , EMR diagnosis entry and problem list management, medication reconciliation and prescription management if applicable, paperwork if applicable, printing documents and documentation of the visit activities.)  Billing:-4 data points, 4 problem points, 4 risk points      Bill Montenegro MD  Neurologist  [facility] Neurology [facility]  Tel:- [phone]    This note was dictated using voice recognition software.  Any grammatical or context distortions are unintentional and inherent to the software.        Again, thank you for allowing me to participate in the care of your patient.        Sincerely,        Bill Montenegro MD

## 2021-10-14 NOTE — PROGRESS NOTES
NEUROLOGY OUTPATIENT CONSULT NOTE   Oct 14, 2021     CHIEF COMPLAINT/REASON FOR VISIT/REASON FOR CONSULT  Patient presents with:  Dizziness: First episode in 2018, second episode early this year     REASON FOR CONSULTATION- Dizziness    REFERRAL SOURCE  Dr. Colon  CC Dr. Colon    HISTORY OF PRESENT ILLNESS  Toma White is a 46 year old female with episodes of dizziness.  The first episode happened in 2018.  Lasted for 24 hours improved on its own.  She was standing and washing dishes when she felt that she was falling over.  She reports that her knees were not steady.  She did not have any dizziness sitting down.  Describes it more as unsteadiness.  Denies no ringing in the ears or any headaches with the spell.    She remained symptom-free till 2021.  She had another episode in March.  There were no provoking factors.  She did try physical therapy/Fitz-Hallpike which did not help.  She was given some medications for suspected eustachian tube dysfunction which did not help.  This was Flonase and Zyrtec.  She denies any ringing in the ears or any pressure in the ears.  Currently is asymptomatic.  She is not dizzy sitting in the chair only when she was trying to stand up.  Denies any lightheadedness or any tunnel vision.    Previous history is reviewed and this is unchanged.    PAST MEDICAL/SURGICAL HISTORY  Past Medical History:   Diagnosis Date     Abnormal Pap smear of cervix      Anemia      Herpes      Patient Active Problem List   Diagnosis     Human Papilloma Virus Infection     Cerv Pap Smear (+) Atyp Squamous Cells Undetermined Signif     Cervical Dysplasia     Herpes Simplex Type II     Helicobacter Pylori (H. Pylori) Infection     Umbilical Hernia     Herpes Simplex Type I     Conductive Hearing Loss     Esophageal Reflux     Impaired Glucose Tolerance Test     Vitamin D deficiency     Obesity (BMI 35.0-39.9) with comorbidity (H)   Reviewed and otherwise noncontributory    FAMILY HISTORY  Family  "History   Problem Relation Age of Onset     No Known Problems Mother      No Known Problems Father    Positive for diabetes and seizure in her son.    SOCIAL HISTORY  Social History     Tobacco Use     Smoking status: Never Smoker     Smokeless tobacco: Never Used   Substance Use Topics     Alcohol use: No     Drug use: No       SYSTEMS REVIEW  Twelve-system ROS was done and other than the HPI this was negative except for dizziness, balance coordination problems, knee pain, weight gain/loss, snoring loudly    MEDICATIONS  azelastine (ASTELIN) 0.1 % nasal spray, Spray 1 spray into both nostrils 2 times daily  cetirizine (ZYRTEC) 10 MG tablet, Take 1 tablet (10 mg) by mouth daily  fluticasone (FLONASE) 50 MCG/ACT nasal spray, Spray 1 spray into both nostrils daily    No current facility-administered medications on file prior to visit.       PHYSICAL EXAMINATION  VITALS: /80 (BP Location: Right arm, Patient Position: Sitting)   Pulse 57   Ht 1.575 m (5' 2\")   Wt 83.9 kg (185 lb)   BMI 33.84 kg/m    GENERAL: Healthy appearing, alert, no acute distress, normal habitus.  CARDIOVASCULAR: Extremities warm and well perfused. Pulses present.   EYES: Funduscopic exam limited.  NEUROLOGICAL:  Patient is awake and oriented to self, place and time.  Attention span is normal.  Memory is grossly intact.  Language is fluent and follows commands appropriately.  Appropriate fund of knowledge. Cranial nerves 2-12 are intact. There is no pronator drift.  Motor exam shows 5/5 strength in all extremities.  Tone is symmetric bilaterally in upper and lower extremities.  Reflexes are symmetric and 2+ in upper extremities and lower extremities. Sensory exam is grossly intact to light touch, pin prick and vibration.  Finger to nose and heel to shin is without dysmetria.  Romberg is negative.  Gait is normal and the patient is able to do tandem walk and walk on toes and heels.      DIAGNOSTICS  MRI  MRI brain 2018 normal per patient. " Report not available to me.     OUTSIDE RECORDS  Outside referral notes and chart notes were reviewed and pertinent information has been summarized (in addition to the HPI):-She was seen in family practice. Had eustachian tube dysfunction. No signs of infection or worrisome concerns. Prescribed Flonase and Zyrtec. This was 2 weeks ago.    IMPRESSION/REPORT/PLAN  Unsteadiness-episodic    This is a 46 year old female with episodic unsteadiness.  Her description of these episodes is not completely clear and she mainly describes unsteadiness where she feels that she is going to fall but no clear vertigo.  Symptoms are not present when she is sitting down.  Episodes only last for 24 hours and then self-limited.  MRI in 2018 was reportedly normal.  I do not have the actual copy of the MRI.  Physical therapy has not helped though currently she is asymptomatic with resolution of symptoms.  Possibilities could include drop attacks, periodic paralysis, seizures.  I will check blood work to look for causes of the spells.  We will also check a EEG.  Could consider repeating the MRI of the brain if no clear cause is identified versus obtaining the previous MRI.  I can see her back in 1 month.    -     EEG Routine; Future  -     Vitamin B12; Future  -     TSH with free T4 reflex; Future  -     Hemoglobin A1c; Future  -     Protein electrophoresis; Future  -     CK total; Future  -     Comprehensive metabolic panel; Future    Return in about 1 month (around 11/14/2021) for In-Clinic Visit, After testing.    Over 64 minutes were spent coordinating the care for the patient on the day of the encounter.  This includes previsit, during visit and post visit activities as documented above.  Reviewing chart.  Counseling patient.  Trying to find old MRI.  (Activities include but not inclusive of reviewing chart, reviewing outside records, reviewing labs and imaging study results as well as the images, patient visit time including getting  history and exam,  use if applicable, review of test results with the patient and coming up with a plan in a shared model, counseling patient and family, education and answering patient questions, EMR , EMR diagnosis entry and problem list management, medication reconciliation and prescription management if applicable, paperwork if applicable, printing documents and documentation of the visit activities.)  Billing:-4 data points, 4 problem points, 4 risk points      Bill Montenegro MD  Neurologist  [facility] Neurology [facility]  Tel:- [phone]    This note was dictated using voice recognition software.  Any grammatical or context distortions are unintentional and inherent to the software.

## 2021-10-15 ENCOUNTER — TRANSFERRED RECORDS (OUTPATIENT)
Dept: HEALTH INFORMATION MANAGEMENT | Facility: CLINIC | Age: 46
End: 2021-10-15

## 2021-10-15 LAB — TOTAL PROTEIN SERUM FOR ELP: 7.4 G/DL (ref 6–8)

## 2021-10-19 LAB
ALBUMIN PERCENT: 60.8 % (ref 51–67)
ALBUMIN SERPL ELPH-MCNC: 4.5 G/DL (ref 3.2–4.7)
ALPHA 1 PERCENT: 2.1 % (ref 2–4)
ALPHA 2 PERCENT: 9.2 % (ref 5–13)
ALPHA1 GLOB SERPL ELPH-MCNC: 0.2 G/DL (ref 0.1–0.3)
ALPHA2 GLOB SERPL ELPH-MCNC: 0.7 G/DL (ref 0.4–0.9)
B-GLOBULIN SERPL ELPH-MCNC: 0.9 G/DL (ref 0.7–1.2)
BETA PERCENT: 12.2 % (ref 10–17)
GAMMA GLOB SERPL ELPH-MCNC: 1.2 G/DL (ref 0.6–1.4)
GAMMA GLOBULIN PERCENT: 15.7 % (ref 9–20)
PATH ICD:: NORMAL
PROT PATTERN SERPL ELPH-IMP: NORMAL
REVIEWING PATHOLOGIST: NORMAL
TOTAL PROTEIN SERUM FOR ELP (SYNCED VALUE): 7.4 G/DL

## 2021-10-21 ENCOUNTER — TELEPHONE (OUTPATIENT)
Dept: NEUROLOGY | Facility: CLINIC | Age: 46
End: 2021-10-21

## 2021-10-21 NOTE — TELEPHONE ENCOUNTER
Patient missed her 10/21 EEG appointment. [facility]. Where should she go to get this done?

## 2021-10-25 ENCOUNTER — TRANSFERRED RECORDS (OUTPATIENT)
Dept: HEALTH INFORMATION MANAGEMENT | Facility: CLINIC | Age: 46
End: 2021-10-25
Payer: COMMERCIAL

## 2021-10-28 ENCOUNTER — TRANSFERRED RECORDS (OUTPATIENT)
Dept: HEALTH INFORMATION MANAGEMENT | Facility: CLINIC | Age: 46
End: 2021-10-28
Payer: COMMERCIAL

## 2021-12-20 ENCOUNTER — TELEPHONE (OUTPATIENT)
Dept: NEUROLOGY | Facility: CLINIC | Age: 46
End: 2021-12-20
Payer: COMMERCIAL

## 2021-12-20 NOTE — TELEPHONE ENCOUNTER
Left message for pt to call back an schedule an EEG at [place] and also a f/u with Dr. Montenegro. (1st available) Please help pt schedule.  Myla Couch MA on 12/20/2021 at 9:57 AM

## 2021-12-20 NOTE — TELEPHONE ENCOUNTER
----- Message from Bill Montenegro MD sent at 12/19/2021  1:28 PM CST -----  Schedule EEG at [facility] and follow up.

## 2021-12-23 ENCOUNTER — OFFICE VISIT (OUTPATIENT)
Dept: FAMILY MEDICINE | Facility: CLINIC | Age: 46
End: 2021-12-23
Payer: COMMERCIAL

## 2021-12-23 VITALS
BODY MASS INDEX: 34.9 KG/M2 | SYSTOLIC BLOOD PRESSURE: 100 MMHG | OXYGEN SATURATION: 96 % | HEART RATE: 60 BPM | WEIGHT: 190.8 LBS | DIASTOLIC BLOOD PRESSURE: 60 MMHG | TEMPERATURE: 97.8 F

## 2021-12-23 DIAGNOSIS — H65.91 OME (OTITIS MEDIA WITH EFFUSION), RIGHT: Primary | ICD-10-CM

## 2021-12-23 PROCEDURE — 99213 OFFICE O/P EST LOW 20 MIN: CPT | Mod: 25 | Performed by: NURSE PRACTITIONER

## 2021-12-23 PROCEDURE — 90471 IMMUNIZATION ADMIN: CPT | Performed by: NURSE PRACTITIONER

## 2021-12-23 PROCEDURE — 90686 IIV4 VACC NO PRSV 0.5 ML IM: CPT | Performed by: NURSE PRACTITIONER

## 2021-12-23 NOTE — PROGRESS NOTES
Assessment and Plan:       ICD-10-CM    1. OME (otitis media with effusion), right  H65.91 Otolaryngology Referral     Patient appears to have right OME with eustachian tube dysfunction.  She has seen numerous specialist and is currently using Flonase and cetirizine with no relief.  I recommend she try over-the-counter Sudafed for 3 to 5 days.  She is to monitor her blood pressure.  Will refer to Dr. Uribe for further evaluation.  I am unsure if she may need imaging due to the amount of pressure within her ear or if a PE tube may be a consideration.  She is content with the plan.    Subjective:     Toma is a 46 year old female presenting to the clinic for concerns for ongoing ear fullness.  Patient saw neurology for dizziness.  She has an EEG ordered.  Patient participated in physical therapy for vestibular rehab.  She has been experiencing right ear fullness for multiple months.  She is seen ENT twice and urgent care where she was diagnosed with eustachian tube dysfunction.  She has been taking ibuprofen as recommended by ENT with no relief.  She also saw a dentist for concerns regarding TMJ and the dentist did not feel as though she had TMJ.  She has been using Flonase and cetirizine with no relief.  She feels a constant pressure within the right ear.  She occasionally has a popping sensation.  No pain is present.  Patient also saw an allergist where she was diagnosed with multiple allergens.  She is quite frustrated with the lack of relief and answers.    Reviewof Systems: A complete 14 point review of systems was obtained and is negative or as stated in the history of present illness.    Social History     Socioeconomic History     Marital status:      Spouse name: Not on file     Number of children: Not on file     Years of education: Not on file     Highest education level: Not on file   Occupational History     Not on file   Tobacco Use     Smoking status: Never Smoker     Smokeless tobacco: Never  Used   Substance and Sexual Activity     Alcohol use: No     Drug use: No     Sexual activity: Yes     Partners: Male   Other Topics Concern     Not on file   Social History Narrative     Not on file     Social Determinants of Health     Financial Resource Strain: Not on file   Food Insecurity: Not on file   Transportation Needs: Not on file   Physical Activity: Not on file   Stress: Not on file   Social Connections: Not on file   Intimate Partner Violence: Not on file   Housing Stability: Not on file       Active Ambulatory Problems     Diagnosis Date Noted     Human Papilloma Virus Infection      Cerv Pap Smear (+) Atyp Squamous Cells Undetermined Signif      Cervical Dysplasia      Herpes Simplex Type II      Helicobacter Pylori (H. Pylori) Infection      Umbilical Hernia      Herpes Simplex Type I      Conductive Hearing Loss      Esophageal Reflux      Impaired Glucose Tolerance Test      Vitamin D deficiency 2016     Obesity (BMI 35.0-39.9) with comorbidity (H) 2021     Resolved Ambulatory Problems     Diagnosis Date Noted     Pregnancy      Status post repeat low transverse  section 2014     Obesity (BMI 35.0-39.9) with comorbidity (H) 2021     Obesity (BMI 35.0-39.9) with comorbidity (H) 2021     Past Medical History:   Diagnosis Date     Abnormal Pap smear of cervix      Anemia      Herpes        Family History   Problem Relation Age of Onset     No Known Problems Mother      No Known Problems Father        Objective:     /60 (BP Location: Right arm, Patient Position: Sitting, Cuff Size: Adult Regular)   Pulse 60   Temp 97.8  F (36.6  C)   Wt 86.5 kg (190 lb 12.8 oz)   SpO2 96%   BMI 34.90 kg/m      Patient is alert, in no obvious distress.   Skin: Warm, dry.  No lesions or rashes.  Skin turgor rapid return.   HEENT:  Head normocephalic, atraumatic.  Eyes normal.  Left ear is normal.  Right ear has a small effusion present.  Nose patent, mucosa pink.   Oropharynx mucosa pink.  No lesions or tonsillar enlargement.   Neck: Supple, no lymphadenopathy.   Lungs:  Clear to auscultation. Respirations even and unlabored.  No wheezing or rales noted.   Heart:  Regular rate and rhythm.  No murmurs.

## 2022-01-13 ENCOUNTER — HOSPITAL ENCOUNTER (OUTPATIENT)
Dept: NEUROLOGY | Facility: HOSPITAL | Age: 47
Discharge: HOME | End: 2022-01-13
Attending: PSYCHIATRY & NEUROLOGY | Admitting: PSYCHIATRY & NEUROLOGY
Payer: COMMERCIAL

## 2022-01-13 DIAGNOSIS — R26.81 UNSTEADINESS: ICD-10-CM

## 2022-01-13 PROCEDURE — 95816 EEG AWAKE AND DROWSY: CPT | Mod: 26 | Performed by: PSYCHIATRY & NEUROLOGY

## 2022-01-13 PROCEDURE — 95816 EEG AWAKE AND DROWSY: CPT

## 2022-01-13 NOTE — PROGRESS NOTES
Awake/Drowsy Routine EEG completed on acquisition machine [id number]. Patient was alert and oriented. Skin was intact.

## 2022-01-19 ENCOUNTER — OFFICE VISIT (OUTPATIENT)
Dept: OTOLARYNGOLOGY | Facility: CLINIC | Age: 47
End: 2022-01-19
Attending: NURSE PRACTITIONER
Payer: COMMERCIAL

## 2022-01-19 DIAGNOSIS — R06.83 SNORINGS: ICD-10-CM

## 2022-01-19 DIAGNOSIS — H93.8X1 EAR FULLNESS, RIGHT: Primary | ICD-10-CM

## 2022-01-19 DIAGNOSIS — F45.8 BRUXISM: ICD-10-CM

## 2022-01-19 DIAGNOSIS — M26.609 TMJ (TEMPOROMANDIBULAR JOINT SYNDROME): ICD-10-CM

## 2022-01-19 PROCEDURE — 99203 OFFICE O/P NEW LOW 30 MIN: CPT | Performed by: OTOLARYNGOLOGY

## 2022-01-19 RX ORDER — AZELASTINE 1 MG/ML
SPRAY, METERED NASAL
Qty: 30 ML | Refills: 11 | Status: SHIPPED | OUTPATIENT
Start: 2022-01-19 | End: 2022-10-31

## 2022-01-19 NOTE — PATIENT INSTRUCTIONS
Patient Education     TMJ Syndrome  The temporomandibular joint (TMJ) is the joint that connects your lower jaw to your skull. You can feel it in front of your ears when you open and close your mouth. TMJ disorders cause chronic or recurrent pain and problems in the jaw joint and the muscles that control jaw movement. Symptoms of TMJ disorders are usually relieved with minor treatments. But symptoms may come back, especially in times of stress.   Causes  There is no widely agreed-on cause of TMJ disorders. They have been linked to injury, arthritis, tooth and jaw alignment, chronic fatigue syndrome, and fibromyalgia. A definite connection has not been shown to these, though.   Symptoms    Pain in the face, jaw, or neck    Pain with jaw movement or chewing    Locking or catching sensation of the jaw    Clicking, popping, or grinding sounds with movement of the TMJ    Headache    Ear pain    Home care  Modest treatments are a good first step toward relieving symptoms. Try these methods.     Reduce stress on your jaw by not eating crunchy or hard-to-chew foods. Don t eat hard or sticky candies. Soft foods and liquids are easier on the jaw.    Protect your jaw while yawning. If you need to yawn, put your fist under your chin to prevent your mouth from opening too wide.    To help relieve pain, put hot or cold packs on the area that hurts. Try both hot and cold to find out which works best for you. To make a cold pack, put ice cubes in a plastic bag that seals at the top. Wrap the bag in a clean, thin towel or cloth. Never put ice or an ice pack directly on the skin. If you use hot packs (small towels soaked in hot water), be careful not to burn yourself.    You may take acetaminophen or ibuprofen for pain, unless you were given a different pain medicine. (Note: If you have chronic liver or kidney disease or have ever had a stomach ulcer or gastrointestinal bleeding, talk with your healthcare provider before using these  medicines. Also talk to your provider if you are taking medicine to prevent blood clots.) Don t give aspirin to a child younger than age 19 unless directed by the child s provider. Taking aspirin can put a child at risk for Reye syndrome. This is a rare but very serious disorder that most often affects the brain and the liver.  Reducing stress  If stress seems to be contributing to your symptoms, try to find the sources of stress in your life. These aren t always obvious. Common stressors include:     Everyday hassles. These include things such as traffic jams, missed appointments, or car trouble.    Major life changes. These can be good, such as a new baby or job promotion. Or they can be bad, such as losing a job or losing a loved one.    Overload. This is the feeling that you have too many responsibilities and can't take care of everything at once.    Helplessness. This is when you feel like your problems are more than you can solve.  When possible, try to make changes in your sources of stress. See if you can avoid hassles, limit the amount of change in your life at one time, and take breaks when you feel overloaded.   Many stressful situations can't be avoided. So learning how to manage stress is very important. To make everyday stress more manageable:     Getting regular exercise.    Eat nutritious, balanced meals.    Get enough rest.    Try relaxation and breathing exercises, visualization, biofeedback, or meditation.    Take some time out to clear your mind.  For more information, talk with your healthcare provider.  Follow-up care  Follow up with your healthcare provider, or as advised. More testing and other treatment may be needed. If changes to your lifestyle do not improve your symptoms, talk with your healthcare provider about other treatments. These include bite guards for help with teeth grinding, stress management methods, and more. If stress is an important factor and does not respond to the above  lifestyle changes, talk with your healthcare provider about a referral for stress management.   If X-rays were done, they will be reviewed by a specialist. You will be told the results and if they affect your treatment.   Call 911  Call 911 if you have any of these:     Trouble breathing or swallowing    Wheezing    Confusion    Extreme drowsiness or trouble awakening    Fainting or loss of consciousness    Fast heart rate  When to seek medical advice  Call your healthcare provider right away if you have any of these:     Swollen or red face    Jaw or face pain that gets worse    Neck, mouth, tooth, or throat pain that gets worse    Fever of 100.4 F (38 C) or higher, or as directed by your healthcare provider  [employer] last reviewed this educational content on 8/1/2020 2000-2021 [employer]. All rights reserved. This information is not intended as a substitute for professional medical care. Always follow your healthcare professional's instructions.

## 2022-01-19 NOTE — LETTER
"    1/19/2022         RE: Toma White  [address]  [facility] [address]        Dear Colleague,    Thank you for referring your patient, Toma White, to the [facility]. Please see a copy of my visit note below.    CHIEF COMPLAINT:  Ear Concern      HISTORY OF PRESENT ILLNESS    Toma was seen at the behest of Yandy Shell for possible ear effusion.   Feels like the right ear \"clicks\" when she is breathing.  She thinks she grinds at night and possibly during day.  She snores at night.  Chews gum regularly.  Admiits to increased life stress.          REVIEW OF SYSTEMS    Review of Systems as per HPI and PMHx, otherwise 10 system review system are negative.     Animal dander, Cats, Cockroach, Mold, Seasonal allergies, and Adhesive tape-silicones [adhesive tape]     There were no vitals taken for this visit.    HEAD: Normal appearance and symmetry:  No cutaneous lesions.      NECK:  supple     EARS: normal TM, EACs     NOSE:     Dorsum:   straight  Septum:  midline  Mucosa:  moist  Inferior turbinates:  left         ORAL CAVITY/OROPHARYNX:     Lips:  Normal.  Tongue: normal, midline  Mucosa:   no lesions  Tonsils:  2-3+ left with crusting     NECK:  Trachea:  midline.              Thyroid:  normal              Adenopathy:  none        NEURO:   Alert and Oriented     GAIT AND STATION:  normal     RESPIRATORY:   Symmetry and Respiratory effort     PSYCH:  Normal mood and affect     SKIN:   warm and dry         IMPRESSION:    Encounter Diagnoses   Name Primary?     Ear fullness, right Yes     Bruxism      Snorings           RECOMMENDATIONS:    Trial of astelin nasal spray  PT referral  RTC 3 months with hearing test        Again, thank you for allowing me to participate in the care of your patient.        Sincerely,        Justin Dhillon MD    "

## 2022-01-19 NOTE — PROGRESS NOTES
"CHIEF COMPLAINT:  Ear Concern      HISTORY OF PRESENT ILLNESS    Toma was seen at the behest of Yandy Shell for possible ear effusion.   Feels like the right ear \"clicks\" when she is breathing.  She thinks she grinds at night and possibly during day.  She snores at night.  Chews gum regularly.  Admiits to increased life stress.  She has been supervised seen by neurology for dizzy episode that occurred this past February.  She is also has been seen by dentistry and is planning on getting a splint oral splint made.  She has been on Flonase in the past without improvement.  I do that see that she has brisk been prescribed Astelin nasal spray and may of last year.  Denies a.m. headaches.  Denies daytime sleepiness.  No history of dizziness or vertigo.  Has been seen by [facility] in the past         REVIEW OF SYSTEMS    Review of Systems as per HPI and PMHx, otherwise 10 system review system are negative.     Animal dander, Cats, Cockroach, Mold, Seasonal allergies, and Adhesive tape-silicones [adhesive tape]     There were no vitals taken for this visit.    HEAD: Normal appearance and symmetry:  No cutaneous lesions.      NECK:  supple     EARS: normal TM, EACs     NOSE:     Dorsum:   straight  Septum:  midline  Mucosa:  moist  Inferior turbinates:  left         ORAL CAVITY/OROPHARYNX:     Lips:  Normal.  Tongue: normal, midline  Mucosa:   no lesions  Tonsils:  2-3+ left with crusting     NECK:  Trachea:  midline.              Thyroid:  normal              Adenopathy:  none        NEURO:   Alert and Oriented     GAIT AND STATION:  normal     RESPIRATORY:   Symmetry and Respiratory effort     PSYCH:  Normal mood and affect     SKIN:   warm and dry         IMPRESSION:    Encounter Diagnoses   Name Primary?     Ear fullness, right Yes     Bruxism      Snorings           RECOMMENDATIONS:    Trial of astelin nasal spray  PT referral  RTC 3 months with hearing test  Request records from [facility]    "

## 2022-01-28 ENCOUNTER — TELEPHONE (OUTPATIENT)
Dept: NEUROLOGY | Facility: CLINIC | Age: 47
End: 2022-01-28
Payer: COMMERCIAL

## 2022-01-28 NOTE — TELEPHONE ENCOUNTER
[facility]    Phone Message    May a detailed message be left on voicemail: yes     Reason for Call: Requesting Results   Name/type of test: EEG  Date of test: 1/10/22  Was test done at a location other than [facility] (Please fill in the location if not [facility])?: No  EEG was done at [facility] in [place].    Please have Dr. Montenegro/care team call this pt back. Thank you.    Action Taken: Other: [facility]    Travel Screening: Not Applicable

## 2022-02-17 ENCOUNTER — HOSPITAL ENCOUNTER (OUTPATIENT)
Dept: PHYSICAL THERAPY | Facility: REHABILITATION | Age: 47
End: 2022-02-17
Payer: COMMERCIAL

## 2022-02-17 DIAGNOSIS — M26.609 TMJ (TEMPOROMANDIBULAR JOINT SYNDROME): Primary | ICD-10-CM

## 2022-02-17 DIAGNOSIS — R29.3 POOR POSTURE: ICD-10-CM

## 2022-02-17 PROCEDURE — 97110 THERAPEUTIC EXERCISES: CPT | Mod: GP | Performed by: PHYSICAL THERAPIST

## 2022-02-17 PROCEDURE — 97161 PT EVAL LOW COMPLEX 20 MIN: CPT | Mod: GP | Performed by: PHYSICAL THERAPIST

## 2022-02-18 NOTE — PROGRESS NOTES
02/17/22 0700   General Information   Type of Visit Initial OP Ortho PT Evaluation   Start of Care Date 02/17/22   Referring Physician Justin Dhillon MD   Patient/Family Goals Statement Hoping this will be the solution   Orders Evaluate and Treat   Date of Order 01/19/22   Certification Required? No   Medical Diagnosis M26.609 (ICD-10-CM) - TMJ (temporomandibular joint syndrome)   Surgical/Medical history reviewed Yes   Precautions/Limitations no known precautions/limitations       Present No   Body Part(s)   Body Part(s) TMJ   Presentation and Etiology   Pertinent history of current problem (include personal factors and/or comorbidities that impact the POC) The patient reports that she started having pain in early 2021.  She was washing dishes and felt like she was going to fall.  She then went to PT for vertigo.  They flushed wax out of the ear and she felt better.  The R side then started hurting and they haven't found anything that would be the cause of her symptoms.     Impairments R. Other   Impairment comment Discomfort, muscle tightness   Functional Limitations Other  (Not limited functionally)   Symptom Location R TMJ   How/Where did it occur From insidious onset   Onset date of current episode/exacerbation 02/17/21   Chronicity Chronic   Pain rating (0-10 point scale) Best (/10);Worst (/10)   Best (/10) 0   Worst (/10) 0   Pain quality H. Other   Pain quality comment Clicking and fullness in the ear   Frequency of pain/symptoms A. Constant   Pain/symptoms are: The same all the time   Pain/symptoms exacerbated by M. Other   Pain exacerbation comment None   Pain/symptoms eased by K. Other   Pain eased by comment Ear spray, rubbing the muscles of her face   Progression of symptoms since onset: Improved   Current Level of Function   [facility] [place]   Fall Risk Screen   Fall screen completed by PT   Have you fallen 2 or more times in the past year? No   Have you fallen  and had an injury in the past year? No   Is patient a fall risk? No   Abuse Screen (yes response referral indicated)   Feels Unsafe at Home or Work/School no   Feels Threatened by Someone no   Does Anyone Try to Keep You From Having Contact with Others or Doing Things Outside Your Home? no   Physical Signs of Abuse Present no   Patient needs abuse support services and resources No   TMJ Objective Findings   Posture Increased thoracic kyphosis, scapular protraction, mild forward head   Joint noises Yes without pain   Joint lock Does not lock   Pain Nothing   Associated symptoms Stress   Headache None   Habits Gum chewing;Unilateral chewing;Clenching   Tongue position resting on back of front teeth   Difficulty swallowing No   Intraoral mouth appliance No   Major dental work No   Tired or painful jaw muscles No   Opening click No   Closing click No   Crepitus No   Subluxation No   Early translation Yes   TMJ ROM Mandible Opening;Mandible Protrusion;Left Laterotrusion;Right Laterotrusion   TMJ ROM Comments Cervical rotation ROM: R 70 L 76   Palpation   (Increased mm tone R SCM, tender pterygoid/masseter R)   Mandible Opening 53mm   Mandible Protrusion Mod limited   Left Laterotrusion 10mm   Right Laterotrusion 10mm   Translation comment Min early R   Planned Therapy Interventions   Planned Therapy Interventions joint mobilization;manual therapy;neuromuscular re-education;ROM;strengthening;stretching   Clinical Impression   Criteria for Skilled Therapeutic Interventions Met yes, treatment indicated   PT Diagnosis Right TMJ syndrome   Influenced by the following impairments Muscle tightness   Functional limitations due to impairments Patient not functionally limited, constant discomfort   Clinical Presentation Stable/Uncomplicated   Clinical Decision Making (Complexity) Low complexity   Therapy Frequency 1 time/week   Predicted Duration of Therapy Intervention (days/wks) 6-12 weeks   Risk & Benefits of therapy have been  explained Yes   Patient, Family & other staff in agreement with plan of care Yes   Clinical Impression Comments Toma White is a 46 year old female who presents to PT with complaints of R ear fullness and clicking.  Initial onset of symptoms was 1 year ago.  She has PMH positive for conductive hearing loss and obesity.  She does not rate any pain but expresses overall discomfort in her R ear and TMJ that is constant.  On exam, patient has minimal early translation on the R TMJ and tenderness in her pterygoid musculature R as well as in her R SCM muscle.  Her symptoms are most consistent with R TMJ syndrome and she is appropriate for skilled 1:1 PT services to address the listed impairments and ease discomfort.   ORTHO GOALS   PT Ortho Eval Goals 1;2;3;4   Ortho Goal 1   Goal Identifier Self-management/HEP   Goal Description Patient will be independent in self-management of condition and HEP   Target Date 04/28/22   Ortho Goal 2   Goal Identifier Discomfort   Goal Description Patient will be able to perform all daily tasks without discomfort in her R ear to improve QOL.   Target Date 04/28/22   Total Evaluation Time   PT Eval, Low Complexity Minutes ([id number]) 35     Michelle Anand, PT, DPT, MHA  2/17/2022

## 2022-03-02 ENCOUNTER — TELEPHONE (OUTPATIENT)
Dept: PHYSICAL THERAPY | Facility: REHABILITATION | Age: 47
End: 2022-03-02

## 2022-03-02 NOTE — TELEPHONE ENCOUNTER
Spoke with patient regarding no show visit.  She was offered visit later in the day but has her kids at home and would prefer to wait until her appointment next week.  She is aware of appointment on 3/10/22 at 7:30am.  NS #1.  Michelle Anand, PT, DPT, A  3/2/2022

## 2022-03-10 ENCOUNTER — HOSPITAL ENCOUNTER (OUTPATIENT)
Dept: PHYSICAL THERAPY | Facility: REHABILITATION | Age: 47
Discharge: HOME | End: 2022-03-10
Payer: COMMERCIAL

## 2022-03-10 DIAGNOSIS — R29.3 POOR POSTURE: ICD-10-CM

## 2022-03-10 DIAGNOSIS — M26.609 TMJ (TEMPOROMANDIBULAR JOINT SYNDROME): Primary | ICD-10-CM

## 2022-03-10 PROCEDURE — 97140 MANUAL THERAPY 1/> REGIONS: CPT | Mod: GP | Performed by: PHYSICAL THERAPIST

## 2022-03-10 PROCEDURE — 97110 THERAPEUTIC EXERCISES: CPT | Mod: GP | Performed by: PHYSICAL THERAPIST

## 2022-03-15 ENCOUNTER — HOSPITAL ENCOUNTER (OUTPATIENT)
Dept: PHYSICAL THERAPY | Facility: REHABILITATION | Age: 47
Discharge: HOME | End: 2022-03-15
Payer: COMMERCIAL

## 2022-03-15 DIAGNOSIS — R29.3 POOR POSTURE: ICD-10-CM

## 2022-03-15 DIAGNOSIS — M26.609 TMJ (TEMPOROMANDIBULAR JOINT SYNDROME): Primary | ICD-10-CM

## 2022-03-15 PROCEDURE — 97140 MANUAL THERAPY 1/> REGIONS: CPT | Mod: GP | Performed by: PHYSICAL THERAPIST

## 2022-03-15 PROCEDURE — 97110 THERAPEUTIC EXERCISES: CPT | Mod: GP | Performed by: PHYSICAL THERAPIST

## 2022-03-24 ENCOUNTER — HOSPITAL ENCOUNTER (OUTPATIENT)
Dept: PHYSICAL THERAPY | Facility: REHABILITATION | Age: 47
Discharge: HOME | End: 2022-03-24
Payer: COMMERCIAL

## 2022-03-24 DIAGNOSIS — R29.3 POOR POSTURE: ICD-10-CM

## 2022-03-24 DIAGNOSIS — M26.609 TMJ (TEMPOROMANDIBULAR JOINT SYNDROME): Primary | ICD-10-CM

## 2022-03-24 PROCEDURE — 97140 MANUAL THERAPY 1/> REGIONS: CPT | Mod: GP | Performed by: PHYSICAL THERAPIST

## 2022-03-24 PROCEDURE — 97110 THERAPEUTIC EXERCISES: CPT | Mod: GP | Performed by: PHYSICAL THERAPIST

## 2022-04-08 ENCOUNTER — HOSPITAL ENCOUNTER (OUTPATIENT)
Dept: PHYSICAL THERAPY | Facility: REHABILITATION | Age: 47
Discharge: HOME | End: 2022-04-08
Payer: COMMERCIAL

## 2022-04-08 DIAGNOSIS — M26.609 TMJ (TEMPOROMANDIBULAR JOINT SYNDROME): Primary | ICD-10-CM

## 2022-04-08 DIAGNOSIS — R29.3 POOR POSTURE: ICD-10-CM

## 2022-04-08 PROCEDURE — 97140 MANUAL THERAPY 1/> REGIONS: CPT | Mod: GP | Performed by: PHYSICAL THERAPIST

## 2022-04-08 PROCEDURE — 97110 THERAPEUTIC EXERCISES: CPT | Mod: GP | Performed by: PHYSICAL THERAPIST

## 2022-04-12 ENCOUNTER — TELEPHONE (OUTPATIENT)
Dept: PHYSICAL THERAPY | Facility: REHABILITATION | Age: 47
End: 2022-04-12
Payer: COMMERCIAL

## 2022-04-12 NOTE — TELEPHONE ENCOUNTER
Spoke with patient.  She overslept for her appointment today.  Unable to come in at offered 9:30 opening.  Confirmed date and time of next scheduled appointment.  NS #2.  Michelle Anand, PT, DPT, A  4/12/2022

## 2022-04-19 ENCOUNTER — HOSPITAL ENCOUNTER (OUTPATIENT)
Dept: PHYSICAL THERAPY | Facility: REHABILITATION | Age: 47
Discharge: HOME | End: 2022-04-19
Payer: COMMERCIAL

## 2022-04-19 DIAGNOSIS — M26.609 TMJ (TEMPOROMANDIBULAR JOINT SYNDROME): Primary | ICD-10-CM

## 2022-04-19 DIAGNOSIS — R42 DIZZINESS: ICD-10-CM

## 2022-04-19 DIAGNOSIS — R29.3 POOR POSTURE: ICD-10-CM

## 2022-04-19 PROCEDURE — 97140 MANUAL THERAPY 1/> REGIONS: CPT | Mod: GP | Performed by: PHYSICAL THERAPIST

## 2022-04-19 PROCEDURE — 97110 THERAPEUTIC EXERCISES: CPT | Mod: GP | Performed by: PHYSICAL THERAPIST

## 2022-04-26 ENCOUNTER — TRANSFERRED RECORDS (OUTPATIENT)
Dept: HEALTH INFORMATION MANAGEMENT | Facility: CLINIC | Age: 47
End: 2022-04-26

## 2022-05-02 NOTE — PROGRESS NOTES
CHIEF COMPLAINT:  Recheck      HISTORY OF PRESENT ILLNESS    Toma was seen in follow up for audiogram review.  Patient continues to have issues with chronic imbalance and right ear pressure and popping.  She has been seen at [facility] in the past I did not was able to review that note.  Their assessment was this was TMJ related.  She denies any tinnitus.  She did have a history of a near fall when she was in her kitchen approximately a year ago.  At that time she was having symptoms with her left ear.  She has had allergy testing in the past.  She did have some transient relief with the Astelin nasal spray but this was only transient.  She has no new concerns today.  She recently had an MRI I believe of the temporal TM joints and is having a splint made.  She has been seen by neurology and had normal EEGs.    My previous note:      Ear fullness, right Yes      Bruxism       Snorings          RECOMMENDATIONS:     Trial of astelin nasal spray  PT referral  RTC 3 months with hearing test  Request records from [facility]             REVIEW OF SYSTEMS    Review of Systems: a 10-system review is reviewed at this encounter.  See scanned document.     Animal dander, Cats, Cockroach, Mold, Seasonal allergies, and Adhesive tape-silicones [adhesive tape]     PHYSICAL EXAM:        HEAD: Normal appearance and symmetry:  No cutaneous lesions.      EARS:   Auricles normal         NOSE:    Dorsum:   straight       ORAL CAVITY/OROPHARYNX:    Lips:  Normal.     NECK:  Trachea:  midline       NEURO:   Alert and Oriented    GAIT AND STATION:  normal     RESPIRATORY:   Symmetry and Respiratory effort    PSYCH:   normal mood and affect    SKIN:  warm and dry       AUDIOLOGY NOTE:    Dr. Dhillon. Last audio from [facility] 5/14/2021 showed normal hearing sensitivity bilat. Pt reports right aural fullness and right  crackling noise when she breaths in and out for the last year and three months. Experiences dizziness described as a  wobbly/unsteady  feeling twice a week for a little over a year. Episodes are brief, lasting only a few seconds and go away when she sits down. She denied  hearing concerns, otalgia, otorrhea, history of ear surgery, and loud noise exposure.  RESULTS: Otoscopy: Clear canals. Tymps: Normal eardrum mobility bilat. Reflexes: Present for ipsi and contra bilat. Audio: Normal  hearing sensitivity bilat. Hearing stable since last audio 5/14/2021  REC: Follow up with ENT as scheduled.    IMPRESSION:   Encounter Diagnoses   Name Primary?     Ear fullness, right Yes     Normal hearing noted on examination       Patient with ongoing fullness and dizziness that may be TMJ related.  I think it is important to rule out a peripheral causes including hydrops.  We will schedule her for vestibular testing.  Results via [employer].  RECOMMENDATIONS:    Referral for vestibular testing  Results via [employer].

## 2022-05-04 ENCOUNTER — OFFICE VISIT (OUTPATIENT)
Dept: AUDIOLOGY | Facility: CLINIC | Age: 47
End: 2022-05-04
Payer: COMMERCIAL

## 2022-05-04 ENCOUNTER — OFFICE VISIT (OUTPATIENT)
Dept: OTOLARYNGOLOGY | Facility: CLINIC | Age: 47
End: 2022-05-04
Payer: COMMERCIAL

## 2022-05-04 DIAGNOSIS — R42 DIZZINESS: ICD-10-CM

## 2022-05-04 DIAGNOSIS — H93.8X1 EAR FULLNESS, RIGHT: Primary | ICD-10-CM

## 2022-05-04 DIAGNOSIS — Z01.10 NORMAL HEARING NOTED ON EXAMINATION: ICD-10-CM

## 2022-05-04 DIAGNOSIS — H93.11 TINNITUS OF RIGHT EAR: Primary | ICD-10-CM

## 2022-05-04 PROCEDURE — 92557 COMPREHENSIVE HEARING TEST: CPT | Performed by: AUDIOLOGIST

## 2022-05-04 PROCEDURE — 99207 PR NO CHARGE LOS: CPT | Performed by: AUDIOLOGIST

## 2022-05-04 PROCEDURE — 99213 OFFICE O/P EST LOW 20 MIN: CPT | Performed by: OTOLARYNGOLOGY

## 2022-05-04 PROCEDURE — 92550 TYMPANOMETRY & REFLEX THRESH: CPT | Mod: 52 | Performed by: AUDIOLOGIST

## 2022-05-04 NOTE — LETTER
5/4/2022         RE: Toma White  [address]  [place] [address]        Dear Colleague,    Thank you for referring your patient, Toma White, to the [facility]. Please see a copy of my visit note below.    CHIEF COMPLAINT:  Recheck      HISTORY OF PRESENT ILLNESS    Toma was seen in follow up for audiogram review.  Patient continues to have issues with chronic imbalance and right ear pressure and popping.  She has been seen at [facility] in the past I did not was able to review that note.  Their assessment was this was TMJ related.  She denies any tinnitus.  She did have a history of a near fall when she was in her kitchen approximately a year ago.  At that time she was having symptoms with her left ear.  She has had allergy testing in the past.  She did have some transient relief with the Astelin nasal spray but this was only transient.  She has no new concerns today.  She recently had an MRI I believe of the temporal TM joints and is having a splint made.  She has been seen by neurology and had normal EEGs.    My previous note:      Ear fullness, right Yes      Bruxism       Snorings          RECOMMENDATIONS:     Trial of astelin nasal spray  PT referral  RTC 3 months with hearing test  Request records from [facility]             REVIEW OF SYSTEMS    Review of Systems: a 10-system review is reviewed at this encounter.  See scanned document.     Animal dander, Cats, Cockroach, Mold, Seasonal allergies, and Adhesive tape-silicones [adhesive tape]     PHYSICAL EXAM:        HEAD: Normal appearance and symmetry:  No cutaneous lesions.      EARS:   Auricles normal         NOSE:    Dorsum:   straight       ORAL CAVITY/OROPHARYNX:    Lips:  Normal.     NECK:  Trachea:  midline       NEURO:   Alert and Oriented    GAIT AND STATION:  normal     RESPIRATORY:   Symmetry and Respiratory effort    PSYCH:   normal mood and affect    SKIN:  warm and dry       AUDIOLOGY  NOTE:    Dr. Dhillon. Last audio from [facility] 5/14/2021 showed normal hearing sensitivity bilat. Pt reports right aural fullness and right  crackling noise when she breaths in and out for the last year and three months. Experiences dizziness described as a wobbly/unsteady  feeling twice a week for a little over a year. Episodes are brief, lasting only a few seconds and go away when she sits down. She denied  hearing concerns, otalgia, otorrhea, history of ear surgery, and loud noise exposure.  RESULTS: Otoscopy: Clear canals. Tymps: Normal eardrum mobility bilat. Reflexes: Present for ipsi and contra bilat. Audio: Normal  hearing sensitivity bilat. Hearing stable since last audio 5/14/2021  REC: Follow up with ENT as scheduled.    IMPRESSION:   Encounter Diagnoses   Name Primary?     Ear fullness, right Yes     Normal hearing noted on examination       Patient with ongoing fullness and dizziness that may be TMJ related.  I think it is important to rule out a peripheral causes including hydrops.  We will schedule her for vestibular testing.  Results via [employer].  RECOMMENDATIONS:    Referral for vestibular testing  Results via [employer].         Again, thank you for allowing me to participate in the care of your patient.        Sincerely,        Justin Dhillon MD

## 2022-05-04 NOTE — PROGRESS NOTES
AUDIOLOGY REPORT    SUMMARY: Audiology visit completed. See audiogram for results.      RECOMMENDATIONS: Follow-up with ENT.    Tammie Anthony, CCC-A  [place] Licensed Audiologist #[id number]

## 2022-05-10 ENCOUNTER — TELEPHONE (OUTPATIENT)
Dept: AUDIOLOGY | Facility: CLINIC | Age: 47
End: 2022-05-10
Payer: COMMERCIAL

## 2022-08-30 NOTE — PROGRESS NOTES
CHIEF COMPLAINT:  Patient presents with:  Ear Problem: Started over a year ago, ear fullness, MRI showed displaced disc          HISTORY OF PRESENT ILLNESS    Toma was seen in follow up for MRI review. Previously seen for ear fullness and dizziness.  Was referred for vestibular testing.   Per patient had recent MRI she would like reviewed.   It has been several months since her last bout of vertigo.  She has an oral appliance with helps with the ear fullness.  She has not yet has the vestibular testing I ordered.   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    TMJ temporal joints show mild displacement bilaterally.   Denies vertigo triggered by sound or heaving lifting.  Previous audiogram WNL.     My previous note:    Encounter Diagnoses   Name Primary?     Ear fullness, right Yes     Normal hearing noted on examination         Patient with ongoing fullness and dizziness that may be TMJ related.    I think it is important to rule out a peripheral causes including hydrops.    We will schedule her for vestibular testing.  Results via [employer].    RECOMMENDATIONS:     Referral for vestibular testing  Results via [employer].             REVIEW OF SYSTEMS    Review of Systems: a 10-system review is reviewed at this encounter.  See scanned document.     Animal dander, Cats, Cockroach, Mold, Seasonal allergies, and Adhesive tape-silicones [adhesive tape]     PHYSICAL EXAM:        HEAD: Normal appearance and symmetry:  No cutaneous lesions.      EARS:   Auricles normal         NOSE:    Dorsum:   straight       ORAL CAVITY/OROPHARYNX:    Lips:  Normal.     NECK:  Trachea:  midline       NEURO:   Alert and Oriented    GAIT AND STATION:  normal     RESPIRATORY:   Symmetry and Respiratory effort    PSYCH:   normal mood and affect    SKIN:  warm and dry         IMPRESSION:   Encounter Diagnoses   Name Primary?     Dizziness Yes     Recurrent vertigo        RECOMMENDATIONS:    Orders Placed This  Encounter   Procedures     Adult Audiology  Referral     Referral for vestibular resting.   Result via [employer].

## 2022-08-31 ENCOUNTER — OFFICE VISIT (OUTPATIENT)
Dept: OTOLARYNGOLOGY | Facility: CLINIC | Age: 47
End: 2022-08-31
Payer: COMMERCIAL

## 2022-08-31 ENCOUNTER — TRANSFERRED RECORDS (OUTPATIENT)
Dept: HEALTH INFORMATION MANAGEMENT | Facility: CLINIC | Age: 47
End: 2022-08-31

## 2022-08-31 DIAGNOSIS — R42 RECURRENT VERTIGO: ICD-10-CM

## 2022-08-31 DIAGNOSIS — R42 DIZZINESS: Primary | ICD-10-CM

## 2022-08-31 PROCEDURE — 99214 OFFICE O/P EST MOD 30 MIN: CPT | Performed by: OTOLARYNGOLOGY

## 2022-08-31 NOTE — LETTER
8/31/2022         RE: Toma White  [address]  [place] [address]        Dear Colleague,    Thank you for referring your patient, Toma White, to the [facility]. Please see a copy of my visit note below.    CHIEF COMPLAINT:  Patient presents with:  Ear Problem: Started over a year ago, ear fullness, MRI showed displaced disc          HISTORY OF PRESENT ILLNESS    Toma was seen in follow up for MRI review. Previously seen for ear fullness and dizziness.  Was referred for vestibular testing.   Per patient had recent MRI she would like reviewed.   It has been several months since her last bout of vertigo.  She has an oral appliance with helps with the ear fullness.  She has not yet has the vestibular testing I ordered.   Recent MRI brain shows possible dehicense of high right semicircular canal.    TMJ temporal joints show mild displacement bilaterally.     My previous note:    Encounter Diagnoses   Name Primary?     Ear fullness, right Yes     Normal hearing noted on examination         Patient with ongoing fullness and dizziness that may be TMJ related.    I think it is important to rule out a peripheral causes including hydrops.    We will schedule her for vestibular testing.  Results via [employer].    RECOMMENDATIONS:     Referral for vestibular testing  Results via [employer].             REVIEW OF SYSTEMS    Review of Systems: a 10-system review is reviewed at this encounter.  See scanned document.     Animal dander, Cats, Cockroach, Mold, Seasonal allergies, and Adhesive tape-silicones [adhesive tape]     PHYSICAL EXAM:        HEAD: Normal appearance and symmetry:  No cutaneous lesions.      EARS:   Auricles normal         NOSE:    Dorsum:   straight       ORAL CAVITY/OROPHARYNX:    Lips:  Normal.     NECK:  Trachea:  midline       NEURO:   Alert and Oriented    GAIT AND STATION:  normal     RESPIRATORY:   Symmetry and Respiratory effort    PSYCH:   normal mood and  affect    SKIN:  warm and dry         IMPRESSION:   Encounter Diagnoses   Name Primary?     Dizziness Yes     Recurrent vertigo        RECOMMENDATIONS:    Orders Placed This Encounter   Procedures     Adult Audiology  Referral     Referral for vestibular resting.   Result via [employer].       Again, thank you for allowing me to participate in the care of your patient.        Sincerely,        Justin Dhillon MD

## 2022-10-31 ENCOUNTER — OFFICE VISIT (OUTPATIENT)
Dept: FAMILY MEDICINE | Facility: CLINIC | Age: 47
End: 2022-10-31
Payer: COMMERCIAL

## 2022-10-31 VITALS
WEIGHT: 184 LBS | DIASTOLIC BLOOD PRESSURE: 66 MMHG | HEIGHT: 61 IN | BODY MASS INDEX: 34.74 KG/M2 | OXYGEN SATURATION: 96 % | HEART RATE: 71 BPM | SYSTOLIC BLOOD PRESSURE: 92 MMHG

## 2022-10-31 DIAGNOSIS — Z12.31 ENCOUNTER FOR SCREENING MAMMOGRAM FOR BREAST CANCER: ICD-10-CM

## 2022-10-31 DIAGNOSIS — Z12.4 CERVICAL CANCER SCREENING: ICD-10-CM

## 2022-10-31 DIAGNOSIS — Z13.220 LIPID SCREENING: ICD-10-CM

## 2022-10-31 DIAGNOSIS — Z13.1 DIABETES MELLITUS SCREENING: ICD-10-CM

## 2022-10-31 DIAGNOSIS — Z00.00 ENCOUNTER FOR ROUTINE HISTORY AND PHYSICAL EXAM IN FEMALE: Primary | ICD-10-CM

## 2022-10-31 LAB
ANION GAP SERPL CALCULATED.3IONS-SCNC: 9 MMOL/L (ref 7–15)
BUN SERPL-MCNC: 14.6 MG/DL (ref 6–20)
CALCIUM SERPL-MCNC: 9.2 MG/DL (ref 8.6–10)
CHLORIDE SERPL-SCNC: 107 MMOL/L (ref 98–107)
CHOLEST SERPL-MCNC: 102 MG/DL
CREAT SERPL-MCNC: 0.78 MG/DL (ref 0.51–0.95)
DEPRECATED HCO3 PLAS-SCNC: 24 MMOL/L (ref 22–29)
GFR SERPL CREATININE-BSD FRML MDRD: >90 ML/MIN/1.73M2
GLUCOSE SERPL-MCNC: 110 MG/DL (ref 70–99)
HBA1C MFR BLD: 5.5 % (ref 0–5.6)
HDLC SERPL-MCNC: 38 MG/DL
HGB BLD-MCNC: 12.4 G/DL (ref 11.7–15.7)
LDLC SERPL CALC-MCNC: 53 MG/DL
NONHDLC SERPL-MCNC: 64 MG/DL
POTASSIUM SERPL-SCNC: 3.9 MMOL/L (ref 3.4–5.3)
SODIUM SERPL-SCNC: 140 MMOL/L (ref 136–145)
TRIGL SERPL-MCNC: 56 MG/DL

## 2022-10-31 PROCEDURE — G0145 SCR C/V CYTO,THINLAYER,RESCR: HCPCS | Performed by: NURSE PRACTITIONER

## 2022-10-31 PROCEDURE — 99396 PREV VISIT EST AGE 40-64: CPT | Mod: 25 | Performed by: NURSE PRACTITIONER

## 2022-10-31 PROCEDURE — 80061 LIPID PANEL: CPT | Performed by: NURSE PRACTITIONER

## 2022-10-31 PROCEDURE — 90471 IMMUNIZATION ADMIN: CPT | Performed by: NURSE PRACTITIONER

## 2022-10-31 PROCEDURE — 85018 HEMOGLOBIN: CPT | Performed by: NURSE PRACTITIONER

## 2022-10-31 PROCEDURE — 90686 IIV4 VACC NO PRSV 0.5 ML IM: CPT | Performed by: NURSE PRACTITIONER

## 2022-10-31 PROCEDURE — 80048 BASIC METABOLIC PNL TOTAL CA: CPT | Performed by: NURSE PRACTITIONER

## 2022-10-31 PROCEDURE — 83036 HEMOGLOBIN GLYCOSYLATED A1C: CPT | Performed by: NURSE PRACTITIONER

## 2022-10-31 PROCEDURE — 36415 COLL VENOUS BLD VENIPUNCTURE: CPT | Performed by: NURSE PRACTITIONER

## 2022-10-31 PROCEDURE — 87624 HPV HI-RISK TYP POOLED RSLT: CPT | Performed by: NURSE PRACTITIONER

## 2022-10-31 ASSESSMENT — ENCOUNTER SYMPTOMS
CHILLS: 0
WEAKNESS: 0
DIZZINESS: 0
SORE THROAT: 0
BREAST MASS: 0
NAUSEA: 0
MYALGIAS: 0
COUGH: 0
HEADACHES: 0
HEMATOCHEZIA: 0
SHORTNESS OF BREATH: 0
DYSURIA: 0
FEVER: 0
DIARRHEA: 0
PARESTHESIAS: 0
PALPITATIONS: 0
HEARTBURN: 0
NERVOUS/ANXIOUS: 0
HEMATURIA: 0
FREQUENCY: 0
ABDOMINAL PAIN: 0
ARTHRALGIAS: 0

## 2022-10-31 NOTE — PROGRESS NOTES
Assessment and Plan:    Encounter for routine history and physical exam in female  Recommend consuming a healthy diet and exercising.  Provided influenza vaccine.  She is otherwise up-to-date on vaccines.  - Basic metabolic panel  (Ca, Cl, CO2, Creat, Gluc, K, Na, BUN)  - Hemoglobin    Encounter for screening mammogram for breast cancer  - MA SCREENING DIGITAL BILAT - Future  (s+30)    Cervical cancer screening  - Pap Screen with HPV - recommended age 30 - 65 years    Diabetes mellitus screening  - Hemoglobin A1c    Lipid screening  - Lipid panel reflex to direct LDL Fasting      Subjective:     Toma is a 47 year old female presenting to the clinic for a female physical.     LMP: April, irregular every few months  Hx of abnormal pap smear: 12 ASCUS could not exclude a high grade squamous lesion.  Colposcopy performed   Last pap smear: 20 normal, negative HPV   Perform self-breast exams: occasionally   Vaginal discharge or irritation: none   Sexually active: yes,  x 15 years  Contraception: tubal ligation  Concerns for STDs: none   Previous pregnancies:three pregnancies,    All boys ages 16, 13, 9    Patient feels well today and has no other concerns.     Review of systems:  I performed a 10 point review of systems.  All pertinent positives and negatives are noted in the HPI. All others are negative.     Allergies   Allergen Reactions     Animal Dander      Cats      Cockroach      Mold      Seasonal Allergies      Adhesive Tape-Silicones [Adhesive Tape] Rash     Was seen in the hospital and got rash where adhesive-tape was placed        Current Outpatient Medications   Medication     azelastine (ASTELIN) 0.1 % nasal spray     azelastine (ASTELIN) 0.1 % nasal spray     cetirizine (ZYRTEC) 10 MG tablet     fluticasone (FLONASE) 50 MCG/ACT nasal spray     No current facility-administered medications for this visit.       Social History     Socioeconomic History     Marital status:       "Spouse name: Not on file     Number of children: Not on file     Years of education: Not on file     Highest education level: Not on file   Occupational History     Not on file   Tobacco Use     Smoking status: Never     Smokeless tobacco: Never   Substance and Sexual Activity     Alcohol use: No     Drug use: No     Sexual activity: Yes     Partners: Male   Other Topics Concern     Not on file   Social History Narrative     Not on file     Social Determinants of Health     Financial Resource Strain: Not on file   Food Insecurity: Not on file   Transportation Needs: Not on file   Physical Activity: Not on file   Stress: Not on file   Social Connections: Not on file   Intimate Partner Violence: Not on file   Housing Stability: Not on file       Past Medical History:   Diagnosis Date     Abnormal Pap smear of cervix      Anemia      Herpes        Family History   Problem Relation Age of Onset     No Known Problems Mother      No Known Problems Father        Past Surgical History:   Procedure Laterality Date      SECTION       MA LAP,TUBAL CAUTERY Bilateral 2018    Procedure: LAPAROSCOPIC BILATERAL SALPINGECTOMY;  Surgeon: Kylah Rivas MD;  Location: [facility];  Service: Gynecology     [facility]  DELIVERY ONLY      Description:  Section;  Recorded: 2011;       Objective:     BP 92/66   Pulse 71   Ht 1.549 m (5' 1\")   Wt 83.5 kg (184 lb)   LMP 2022   SpO2 96%   BMI 34.77 kg/m      Patient is alert, no obvious distress.   Skin: Warm, dry.  No rashes or lesions. Skin turgor rapid return.   HEENT:  Eyes normal.  Ears normal.  Nose patent, mucosa pink.  Oropharynx mucosa pink, no lesions or tonsil enlargement.   Neck:  Supple, without lymphadenopathy, bruits, JVD. Thyroid normal texture and size.    Lungs:  Clear to auscultation.  No wheezing, rales noted.  Respirations even and unlabored.   Heart:  Regular rate and rhythm.  No murmurs.   Breasts:  Normal.  No " surrounding adenopathy.   Abdomen: Soft, nontender.  No organomegaly.  Bowel sounds normoactive.  No guarding or masses noted.   :  External genitalia normal.  Normal vaginal mucosa.  Cervix no lesions or cervical motion tenderness.  Musculoskeletal:  Full ROM of extremities.  Muscle strength equal +5/5.   Neurological:  Cranial nerves 2-12 intact.           Answers for HPI/ROS submitted by the patient on 10/31/2022  Frequency of exercise:: 1 day/week  Getting at least 3 servings of Calcium per day:: Yes  Diet:: Regular (no restrictions)  Taking medications regularly:: Yes  Medication side effects:: None  Bi-annual eye exam:: Yes  Dental care twice a year:: Yes  Sleep apnea or symptoms of sleep apnea:: None  abdominal pain: No  Blood in stool: No  Blood in urine: No  chest pain: No  chills: No  congestion: No  cough: No  diarrhea: No  dizziness: No  ear pain: Yes  nervous/anxious: No  fever: No  frequency: No  genital sores: No  headaches: No  hearing loss: No  heartburn: No  arthralgias: No  peripheral edema: No  mood changes: No  myalgias: No  nausea: No  dysuria: No  palpitations: No  Skin sensation changes: No  sore throat: No  urgency: No  rash: No  shortness of breath: No  visual disturbance: No  weakness: No  pelvic pain: No  vaginal discharge: No  tenderness: No  breast mass: No  breast discharge: No  Additional concerns today:: No  Duration of exercise:: N/A

## 2022-11-02 LAB
BKR LAB AP GYN ADEQUACY: NORMAL
BKR LAB AP GYN INTERPRETATION: NORMAL
BKR LAB AP HPV REFLEX: NORMAL
BKR LAB AP LMP: NORMAL
BKR LAB AP PREVIOUS ABNORMAL: NORMAL
PATH REPORT.COMMENTS IMP SPEC: NORMAL
PATH REPORT.COMMENTS IMP SPEC: NORMAL
PATH REPORT.RELEVANT HX SPEC: NORMAL

## 2022-11-04 LAB
HUMAN PAPILLOMA VIRUS 16 DNA: NEGATIVE
HUMAN PAPILLOMA VIRUS 18 DNA: NEGATIVE
HUMAN PAPILLOMA VIRUS FINAL DIAGNOSIS: NORMAL
HUMAN PAPILLOMA VIRUS OTHER HR: NEGATIVE

## 2022-11-18 ENCOUNTER — ANCILLARY PROCEDURE (OUTPATIENT)
Dept: MAMMOGRAPHY | Facility: HOSPITAL | Age: 47
End: 2022-11-18
Attending: NURSE PRACTITIONER
Payer: COMMERCIAL

## 2022-11-18 DIAGNOSIS — Z12.31 ENCOUNTER FOR SCREENING MAMMOGRAM FOR BREAST CANCER: ICD-10-CM

## 2022-11-18 PROCEDURE — 77067 SCR MAMMO BI INCL CAD: CPT

## 2022-11-29 NOTE — ADDENDUM NOTE
Encounter addended by: Michelle Anand, PT on: 11/29/2022 10:32 AM   Actions taken: Clinical Note Signed, Episode resolved

## 2022-11-29 NOTE — PROGRESS NOTES
[facility]    Outpatient Physical Therapy Discharge Note  Patient: Toma White  : 1975    Beginning/End Dates of Reporting Period:  22 to 22    Referring Provider: Justin Dhillon MD    Therapy Diagnosis: Right TMJ syndrome     Client Self Report: Patient reports symptoms come and go, but has noted an 85% improvement in symptoms since starting therapy. She saw TMJ specialist who ordered a mouth guard. She is going to get an MRI for the TMJ on . Doctor suggested exercise with tongue on roof of mouth.    Objective Measurements:  Objective Measure: Cervical ROM  Details: Not taken today  Objective Measure: Palpation  Details: Tenderness at R SCM    Goals:  Goal Identifier Self-management/HEP   Goal Description Patient will be independent in self-management of condition and HEP   Target Date 22   Date Met      Progress (detail required for progress note): met     Goal Identifier Discomfort   Goal Description Patient will be able to perform all daily tasks without discomfort in her R ear to improve QOL.   Target Date 22   Date Met      Progress (detail required for progress note): Patient reports 85% improvement     Plan:  Discharge from therapy.    Discharge:    Reason for Discharge: Patient overall doing well and did not schedule any additional appointments.  She has not returned since 22 and is appropriate for discharge at this time.    Equipment Issued: None    Discharge Plan: Patient to continue home program.    Michelle Anand, PT, DPT, A  2022

## 2022-12-30 ENCOUNTER — VIRTUAL VISIT (OUTPATIENT)
Dept: FAMILY MEDICINE | Facility: CLINIC | Age: 47
End: 2022-12-30
Payer: COMMERCIAL

## 2022-12-30 DIAGNOSIS — R29.898 JAW CLICKING: Primary | ICD-10-CM

## 2022-12-30 DIAGNOSIS — H93.8X1 EAR FULLNESS, RIGHT: ICD-10-CM

## 2022-12-30 PROCEDURE — 99213 OFFICE O/P EST LOW 20 MIN: CPT | Mod: 95 | Performed by: NURSE PRACTITIONER

## 2022-12-30 NOTE — PROGRESS NOTES
Toma is a 47 year old who is being evaluated via a billable telephone visit.      What phone number would you like to be contacted at? [phone]  How would you like to obtain your AVS? Nikhil    Distant Location (provider location):  On-site    ICD-10-CM    1. Jaw clicking  R29.898 Dental Referral      2. Ear fullness, right  H93.8X1         Will refer to [facility] for further evaluation per patient request.  She is content with the plan.    Subjective   Toma is a 47 year old  presenting for the following health issues:  Referral (Has an issue with her bite which is causing her ear pain and discomfort. Needs a specialist that helps with correcting bit. ENT said they didn't see any issues. )  Patient requests referral to [facility].  Patient has been experiencing a clicking sound within her right ear when she breathes or moves her jaw for multiple years.  She was evaluated by ENT in the past.  She was told by her dentist that her bite is misaligned.  She occasionally has fullness within the right ear.  She denies any facial pain or pressure.  She does not take any over-the-counter products for symptoms.      Review of Systems   Constitutional, HEENT, cardiovascular, pulmonary, gi and gu systems are negative, except as otherwise noted.      Objective           Vitals:  No vitals were obtained today due to virtual visit.    Physical Exam   healthy, alert and no distress  PSYCH: Alert and oriented times 3; coherent speech, normal   rate and volume, able to articulate logical thoughts, able   to abstract reason, no tangential thoughts, no hallucinations   or delusions  Her affect is normal  RESP: No cough, no audible wheezing, able to talk in full sentences  Remainder of exam unable to be completed due to telephone visits    Phone call duration: 7 minutes

## 2023-01-06 ENCOUNTER — TELEPHONE (OUTPATIENT)
Dept: AUDIOLOGY | Facility: CLINIC | Age: 48
End: 2023-01-06

## 2023-01-06 NOTE — TELEPHONE ENCOUNTER
"\"I m calling from the Audiology and Balance Testing department at the . This is just a call to remind you of your upcoming Balance Testing appointment on [Date], and to see if you have any questions or concerns regarding the balance testing you'll be doing. You should have received an itinerary via mail or via [employer], if you are active, that goes over what to expect and explains the dos and don ts both 48 hours before, and the day of. There is a list of medications for you to review on the itinerary that we would like you to stop taking beforehand. If you didn t receive the itinerary or you still have questions, please give our clinic a call at [phone]. Otherwise, we will see you on [Date] starting at [Time].\"    Please send encounter if patient would like to reschedule.  "

## 2023-01-10 ENCOUNTER — OFFICE VISIT (OUTPATIENT)
Dept: AUDIOLOGY | Facility: CLINIC | Age: 48
End: 2023-01-10
Payer: COMMERCIAL

## 2023-01-10 DIAGNOSIS — H93.8X1 EAR PRESSURE, RIGHT: Primary | ICD-10-CM

## 2023-01-10 DIAGNOSIS — R42 RECURRENT VERTIGO: ICD-10-CM

## 2023-01-10 PROCEDURE — 92537 CALORIC VSTBLR TEST W/REC: CPT | Performed by: AUDIOLOGIST

## 2023-01-10 PROCEDURE — 92545 OSCILLATING TRACKING TEST: CPT | Mod: 59 | Performed by: AUDIOLOGIST

## 2023-01-10 PROCEDURE — 92541 SPONTANEOUS NYSTAGMUS TEST: CPT | Performed by: AUDIOLOGIST

## 2023-01-10 PROCEDURE — 92519 VEMP TST I&R CERVICAL&OCULAR: CPT | Performed by: AUDIOLOGIST

## 2023-01-10 PROCEDURE — 92567 TYMPANOMETRY: CPT | Performed by: AUDIOLOGIST

## 2023-01-10 PROCEDURE — 92542 POSITIONAL NYSTAGMUS TEST: CPT | Mod: 59 | Performed by: AUDIOLOGIST

## 2023-01-10 PROCEDURE — 92584 ELECTROCOCHLEOGRAPHY: CPT | Performed by: AUDIOLOGIST

## 2023-01-10 NOTE — PROGRESS NOTES
AUDIOLOGY REPORT    BACKGROUND INFORMATION: Toma White was seen in Audiology at the [facility] on 1/10/2023 for an electrocochleography (ECochG) evaluation, referred by Justin Dhillon M.D. The patient reports ongoing issues with right sided aural fullness/ facial pressure, crackling sounds when breathing out, and occasional right otalgia. She reports following up with a dentist and TMJ was the suspected cause. She now sleeps with a mouthguard and has noticed some improvement in symptoms, however they are still present intermittently. She will also be following up with an orthodontist for suspected bite issues contributing to her symptoms. 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 She reports some episodes of dizziness starting in February 2021 that occurred for about a year. She reports the episodes would happen when she was moving around a lot, but cannot pinpoint any specific movement or position that triggered the dizziness. She described the episodes as imbalance that would last for up to 5 minutes. She reports seeing a physical therapist which did not help, and then had her ear flushed out and the dizziness resolved. She has not had any recent episodes of dizziness. She denies dizziness triggered by loud sounds,  pressure changes or exertion. She reports 2 falls in the past year but denies any injuries.      Hearing evaluations have revealed normal hearing bilaterally. She denies recent changes or fluctuations in hearing, drainage, tinnitus, and history of ear surgeries.  Toma reports a history of headaches, but none recently. She wears corrective lenses (bifocals). She denies blurred or double vision or history of eye surgeries. She denies sensitivity to bright light but reports being around loud sounds can become bothersome. She denies motion intolerance, history of head injuries,  cancer/chemotherapy, and family history of hearing loss, migraines, or vertigo.      TEST RESULTS AND PROCEDURES:   Abuse Screening:  Do you feel unsafe at home or work/school? No  Do you feel threatened by someone? No  Does anyone try to keep you from having contact with others, or doing things outside of your home? No  Physical signs of abuse present? No    Electrocochleography (ECochG) testing is performed using an auditory evoked potentials system.  Surface electrodes are placed behind the test ear with extratympanic tymptrode placed in the test ear in order to obtain a near-field recording that measures the response of the cochlear hair cells and auditory nerve in response to auditory stimuli. The measured response consists of the summating potential (SP) and the cochlear nerve action potential (AP). Abnormalities may take the form of an increased summating potential/compound action potential (SP/AP) ratio (due to an increased summating potential) in patients suspected of Meniere's disease (endolymphatic hydrops) or in those patients who have symptoms of vestibular dysfunction or ear symptoms including asymmetric or fluctuating hearing loss, tinnitus and/or aural fullness. The following can be suggestive of an abnormal EcochG: SP/AP ratio exceeds 0.43.  Tympanograms showed normal eardrum mobility bilaterally. Using a microscope tympanic membranes were visualized.       A two-channel ECochG recording was performed for clicks bilaterally.  Clicks for the right ear showed normal SP/AP ratios.    Clicks for the left ear showed normal SP/AP ratios.         Click SP/AP ratio   Right ear  0.248   Left ear  0.297     Abnormal SP/AP ratios must be greater than .43 for clicks.       SUMMARY AND RECOMMENDATIONS: Today s ECochG revealed normal SP/AP ratios.  Please call this clinic with questions regarding today s results.  Follow-up with Dr. Dhillon for medical management.      Sha Cartagena.  Licensed Audiologist  [place] #  [id number]

## 2023-01-10 NOTE — PROGRESS NOTES
AUDIOLOGY REPORT    SUBJECTIVE: Toma White was seen in the Audiology Clinic at the [facility] on 1/10/2023 for a vestibular evoked myogenic potential (VEMP) evaluation, referred by Justin Dhillon M.D. Toma reports ongoing issues with right sided aural fullness/ facial pressure, crackling sounds when breathing out, and occasional right otalgia. She reports following up with a dentist and TMJ was the suspected cause. She now sleeps with a mouthguard and has noticed some improvement in symptoms, however they are still present intermittently. She will also be following up with an orthodontist for suspected bite issues contributing to her symptoms. 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 She reports some episodes of dizziness starting in February 2021 that occurred for about a year. She reports the episodes would happen when she was moving around a lot, but cannot pinpoint any specific movement or position that triggered the dizziness. She described the episodes as imbalance that would last for up to 5 minutes. She reports seeing a physical therapist which did not help, and then had her ear flushed out and the dizziness resolved. She has not had any recent episodes of dizziness. She denies dizziness triggered by loud sounds,  pressure changes or exertion. She reports 2 falls in the past year but denies any injuries.      Hearing evaluations have revealed normal hearing bilaterally. She denies recent changes or fluctuations in hearing, drainage, tinnitus, and history of ear surgeries.  Toma reports a history of headaches, but none recently. She wears corrective lenses (bifocals). She denies blurred or double vision or history of eye surgeries. She denies sensitivity to bright light but reports being around loud sounds can become bothersome. She denies motion intolerance, history of head injuries,  cancer/chemotherapy, and family history of hearing loss, migraines, or vertigo.    OBJECTIVE:   Abuse Screening:  Do you feel unsafe at home or work/school? No  Do you feel threatened by someone? No  Does anyone try to keep you from having contact with others, or doing things outside of your home? No  Physical signs of abuse present? No    VEMP testing is performed using an auditory evoked potentials system. Surface electrodes are placed in several locations on the patient's head and neck in order to record muscle motoneuron activity in response to loud auditory stimuli. This testing assesses very specific vestibular pathways in the inner ear and central nervous system. cVEMP testing is performed with electrodes placed on the patient's sternocleidomastoid muscle, and is used to assess the saccular organ, afferent inferior vestibular nerve and vestibular nuclei within the brainstem. oVEMP testing is performed with electrodes placed under the patient's eyes, and is used to assess the utricle and afferent superior vestibular nerve and nuclei within the brainstem.  Patients that may benefit from this procedure are those with complaints of vertigo and imbalance or those suspected of superior canal dehiscence. A total of 90 minutes was spent completing the VEMP testing today.    Tympanograms: Performed during [facility] evaluation prior to VEMP testing     RIGHT: normal eardrum mobility      LEFT: normal eardrum mobility    cVEMP Thresholds via 500 Hz toneburst:    RIGHT: 85 dB nHL which  suggest normal saccular and inferior vestibular nerve function    LEFT: 85 dB nHL which  suggest normal saccular and inferior vestibular nerve function    AMPLITUDE ASYMMETRY: 1%; Normal (greater than 33% is considered abnormal)    oVEMP Thresholds via 500 Hz toneburst:     RIGHT: 97 dB nHL which suggests normal utricle and superior vestibular nerve function    LEFT: 85 dB nHL which suggests normal utricle and superior vestibular nerve  function    AMPLITUDE ASYMMETRY: 20%; Normal (greater than 33% is considered abnormal)    ASSESSMENT: Today's results suggest a normal VEMP evaluation. These results suggest normal saccular and inferior vestibular nerve function and normal utricle and superior vestibular nerve function.      PLAN: The patient will follow up with Justin Dhillon M.D. for medical management. Please call this clinic with questions regarding these results or recommendations.      Sha Cartagena.  Licensed Audiologist  [place] # [id number]

## 2023-01-10 NOTE — PROGRESS NOTES
AUDIOLOGY REPORT-BALANCE ASSESSMENT    SUBJECTIVE: Toma White, 47 year old, was seen in Audiology at the [facility] on 1/10/2023, for videonystagmography (VNG), referred by Justin Dhillon M.D. The patient reports ongoing issues with right sided aural fullness/ facial pressure, crackling sounds when breathing out, and occasional right otalgia. She reports following up with a dentist and TMJ was the suspected cause. She now sleeps with a mouthguard and has noticed some improvement in symptoms, however they are still present intermittently. She will also be following up with an orthodontist for suspected bite issues contributing to her symptoms. 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 She reports some episodes of dizziness starting in February 2021 that occurred for about a year. She reports the episodes would happen when she was moving around a lot, but cannot pinpoint any specific movement or position that triggered the dizziness. She described the episodes as imbalance that would last for up to 5 minutes. She reports seeing a physical therapist which did not help, and then had her ear flushed out and the dizziness resolved. She has not had any recent episodes of dizziness. She denies dizziness triggered by loud sounds,  pressure changes or exertion. She reports 2 falls in the past year but denies any injuries.     Hearing evaluations have revealed normal hearing bilaterally. She denies recent changes or fluctuations in hearing, drainage, tinnitus, and history of ear surgeries.  Toma reports a history of headaches, but none recently. She wears corrective lenses (bifocals). She denies blurred or double vision or history of eye surgeries. She denies sensitivity to bright light but reports being around loud sounds can become bothersome. She denies motion intolerance, history of head injuries, cancer/chemotherapy,  and family history of hearing loss, migraines, or vertigo. Toma has not taken any antivestibular medications in the past 48 hours. She does report consuming 1/4 glass of wine about 20 hours ago.       OBJECTIVE:  Abuse Screening:  Do you feel unsafe at home or work/school? No  Do you feel threatened by someone? No  Does anyone try to keep you from having contact with others, or doing things outside of your home? No  Physical signs of abuse present? No    Videonystagmography (VNG) testing:  Prescreening:  Tympanograms: normal eardrum mobility bilaterally (performed during [facility] evaluation prior to VNG testing). Note: this test is completed to determine the status of the middle ear before irrigations are completed.  Ocular range of motion and ocular counter roll: Normal  Cross/cover:Normal  Head Thrust: Negative     Nystagmus Tests:  Gaze-Horizontal with fixation:   Center: Normal   Right: Normal   Left: Normal  Gaze-Vertical with fixation:   Up: Normal   Down: Normal  Gaze with fixation denied:   Center: Normal   Right: Normal   Left: Normal   Up: Normal  High Frequency Headshake:   Horizontal: Negative   Vertical: Negative    Fitz-Hallpike Head Right: Negative for PC-BPPV. No nystagmus or symptoms   Seymour-Hallpike Head Left: negative for PC-BPPV. No nystagmus or symptoms   Roll Test Head Right: negative for HC-BPPV. No nystagmus or symptoms   Roll Test Head Left: negative for HC-BPPV. No nystagmus or symptoms     Positional Testing:  Positionals: Supine: Normal  Positionals: Body Right: Normal  Positionals: Body Left: Normal  Positionals: Pre-Caloric: Normal    Oculomotor Tests:  Saccades: Normal  Anti-saccades: Normal; Patient able to perform task  Pursuit: Abnormal  Repeat Pursuit: Abnormal    Calorics :  (Tested at 44 degrees and 30 degrees Celsius for 30 seconds for warm and cool water, respectively):  Right Warm Eye Speed: 78 degrees per second right beating  Left Warm Eye Speed: 88 degrees per second left  beating  Difference between ear: 7% right weakness. (Greater than 25% considered clinically significant.)  Fixation Index: Normal  Overall caloric test: Normal  Note: Unable to perform cool water irrigations due to patient becoming very ill and vomiting. Patient left with assistance of a wheelchair as she did not feel well enough to ambulate on her own.     Post-Calorics Otoscopy: Normal    ASSESSMENT:  1. Indications of central vestibular system involvement noted on today's exam were as follows:   - Abnormal Pursuit testing; repeatable    2. There were no significant indications of peripheral vestibular system involvement noted on today's exam.       PLAN:  Follow-up with Dr. Dhillon regarding today's results and for medical management.  Please call this clinic at [phone] with questions regarding these results or recommendations.       Sha Cartagena.  Licensed Audiologist  [place] # [id number]

## 2023-02-16 ENCOUNTER — OFFICE VISIT (OUTPATIENT)
Dept: FAMILY MEDICINE | Facility: CLINIC | Age: 48
End: 2023-02-16
Payer: COMMERCIAL

## 2023-02-16 VITALS
SYSTOLIC BLOOD PRESSURE: 120 MMHG | HEART RATE: 53 BPM | HEIGHT: 62 IN | BODY MASS INDEX: 35.15 KG/M2 | TEMPERATURE: 97.4 F | RESPIRATION RATE: 16 BRPM | WEIGHT: 191 LBS | OXYGEN SATURATION: 99 % | DIASTOLIC BLOOD PRESSURE: 78 MMHG

## 2023-02-16 DIAGNOSIS — G44.209 TENSION HEADACHE: ICD-10-CM

## 2023-02-16 DIAGNOSIS — R06.83 SNORING: Primary | ICD-10-CM

## 2023-02-16 PROCEDURE — 99213 OFFICE O/P EST LOW 20 MIN: CPT | Performed by: NURSE PRACTITIONER

## 2023-02-16 ASSESSMENT — ENCOUNTER SYMPTOMS: HEADACHES: 1

## 2023-02-16 NOTE — PROGRESS NOTES
"Assessment/Plan:   1. Snoring  Patient's  is concerned about her snoring, apneic episodes, fatigue Stop Bang score 3/8 intermediate risk, Mallampati score 3  - Adult Sleep Eval & Management  Referral; Future    2. Tension headache  DD: tension, migraine, rebound   Has the symptoms of tension. Neuro exam negative. No red flags   Stop using tylenol/ibuprofen to see if headaches improve    Return in about 1 month (around 3/16/2023) for Follow up sleep specialist.        Viktoria Chua is a 47 year old presenting for the following health issues:  Sleep Problem and Headache      Headache     History of Present Illness       Reason for visit:  Sleep and little headache  Symptom onset:  More than a month  Symptoms include:  Tireness  Symptom intensity:  Moderate    She eats 2-3 servings of fruits and vegetables daily.She consumes 1 sweetened beverage(s) daily.She exercises with enough effort to increase her heart rate 9 or less minutes per day.  She exercises with enough effort to increase her heart rate 3 or less days per week.   She is taking medications regularly.     Sleep: her  is concerned about her snoring, has noticed significant snoring, some apneic episodes, she is also fatigued during the day. She would like a referral to sleep study    Headaches: several days, tends to have constricting headache, no photosensitivity, noise or smells, no nausea or vomiting, no visual, hearing, balance changes.     Review of Systems   Neurological: Positive for headaches.   Psychiatric/Behavioral: Positive for sleep disturbance.   All other systems reviewed and are negative.           Objective    /78 (BP Location: Right arm, Patient Position: Sitting, Cuff Size: Adult Large)   Pulse 53   Temp 97.4  F (36.3  C) (Tympanic)   Resp 16   Ht 1.575 m (5' 2\")   Wt 86.6 kg (191 lb)   LMP  (LMP Unknown)   SpO2 99%   BMI 34.93 kg/m    Body mass index is 34.93 kg/m .  Physical Exam  Constitutional:  "      Appearance: Normal appearance.   HENT:      Right Ear: Tympanic membrane, ear canal and external ear normal.      Left Ear: Tympanic membrane, ear canal and external ear normal.      Mouth/Throat:      Mouth: Mucous membranes are moist.      Pharynx: Oropharynx is clear.   Eyes:      Extraocular Movements: Extraocular movements intact.      Conjunctiva/sclera: Conjunctivae normal.      Pupils: Pupils are equal, round, and reactive to light.   Musculoskeletal:         General: Normal range of motion.   Skin:     General: Skin is warm and dry.   Neurological:      Mental Status: She is alert and oriented to person, place, and time.      Cranial Nerves: Cranial nerves 2-12 are intact.      Sensory: Sensation is intact.      Motor: Motor function is intact.      Coordination: Coordination is intact.      Gait: Gait is intact.      Deep Tendon Reflexes: Reflexes are normal and symmetric.   Psychiatric:         Mood and Affect: Mood normal.         Behavior: Behavior normal.         Thought Content: Thought content normal.         Judgment: Judgment normal.

## 2023-02-19 ASSESSMENT — ENCOUNTER SYMPTOMS: SLEEP DISTURBANCE: 1

## 2023-05-04 NOTE — PROGRESS NOTES
"CHIEF COMPLAINT:  Patient presents with:  Dizziness: Follow up for vertigo, no dizziness, pressure on the right ear by the jaw and doing massages and does help, ears are plugged, right ear makes clinking noise when breathing in         HISTORY OF PRESENT ILLNESS    Toma was seen in follow up after previous 8/31/2022 visit for review of vestibular testing. No further episodes of vertigo.  Right ear feels plugged.  It makes a \"crackling\" when she breathes in.   She has been told that her jaw is misaligned and is planning on having this addressed.  She will be wearing a splint.  She is scheduled for sleep study in June.     VESTIBULAR TESTING     ASSESSMENT:  1. Indications of central vestibular system involvement noted on today's exam were as follows:   - Abnormal Pursuit testing; repeatable     2. There were no significant indications of peripheral vestibular system involvement noted on today's exam.       Encounter Diagnoses   Name Primary?     Dizziness Yes     Recurrent vertigo           RECOMMENDATIONS:         Orders Placed This Encounter   Procedures     Adult Audiology  Referral      Referral for vestibular resting.   Result via [employer].       REVIEW OF SYSTEMS    Review of Systems: a 10-system review is reviewed at this encounter.  See scanned document.       Allergies   Allergen Reactions     Animal Dander      Cats      Cockroach      Mold      Seasonal Allergies      Adhesive Tape-Silicones [Adhesive Tape] Rash     Was seen in the hospital and got rash where adhesive-tape was placed            PHYSICAL EXAM:        HEAD: Normal appearance and symmetry:  No cutaneous lesions.      EARS:        RIGHT:  {external auditory canals clear, tympanic membranes normal   LEFT:    {external auditory canals clear, tympanic membranes normal     NOSE:    Dorsum:   straight       ORAL CAVITY/OROPHARYNX:    Lips:  Normal.     NECK:  Trachea:  midline     NEURO:   Alert and Oriented    GAIT AND STATION:  normal   "   RESPIRATORY:   Symmetry and Respiratory effort    PSYCH:   normal mood and affect    SKIN:  warm and dry         IMPRESSION:   Encounter Diagnoses   Name Primary?     Dizziness Yes     TMJ (temporomandibular joint syndrome)      Ear fullness, right      Normal hearing test             RECOMMENDATIONS:    TMJ precautions  Return as needed.

## 2023-05-05 ENCOUNTER — OFFICE VISIT (OUTPATIENT)
Dept: OTOLARYNGOLOGY | Facility: CLINIC | Age: 48
End: 2023-05-05
Payer: COMMERCIAL

## 2023-05-05 DIAGNOSIS — Z01.10 NORMAL HEARING TEST: ICD-10-CM

## 2023-05-05 DIAGNOSIS — R42 DIZZINESS: Primary | ICD-10-CM

## 2023-05-05 DIAGNOSIS — M26.609 TMJ (TEMPOROMANDIBULAR JOINT SYNDROME): ICD-10-CM

## 2023-05-05 DIAGNOSIS — H93.8X1 EAR FULLNESS, RIGHT: ICD-10-CM

## 2023-05-05 PROCEDURE — 99213 OFFICE O/P EST LOW 20 MIN: CPT | Performed by: OTOLARYNGOLOGY

## 2023-05-05 NOTE — PATIENT INSTRUCTIONS
Ear exam is normal today  Try massage, soft diet, warm compress for ear pain and fullness  Return as needed

## 2023-05-05 NOTE — LETTER
"    5/5/2023         RE: Toma White  [address]  [facility] [address]        Dear Colleague,    Thank you for referring your patient, Toma White, to the [facility]. Please see a copy of my visit note below.    CHIEF COMPLAINT:  Patient presents with:  Dizziness: Follow up for vertigo, no dizziness, pressure on the right ear by the jaw and doing massages and does help, ears are plugged, right ear makes clinking noise when breathing in         HISTORY OF PRESENT ILLNESS    Toma was seen in follow up after previous 8/31/2022 visit for review of vestibular testing. No further episodes of vertigo.  Right ear feels plugged.  It makes a \"crackling\" when she breathes in.   She has been told that her jaw is misaligned and is planning on having this addressed.  She will be wearing a splint.  She is scheduled for sleep study in June.     VESTIBULAR TESTING     ASSESSMENT:  1. Indications of central vestibular system involvement noted on today's exam were as follows:   - Abnormal Pursuit testing; repeatable     2. There were no significant indications of peripheral vestibular system involvement noted on today's exam.       Encounter Diagnoses   Name Primary?     Dizziness Yes     Recurrent vertigo           RECOMMENDATIONS:         Orders Placed This Encounter   Procedures     Adult Audiology  Referral      Referral for vestibular resting.   Result via [employer].       REVIEW OF SYSTEMS    Review of Systems: a 10-system review is reviewed at this encounter.  See scanned document.       Allergies   Allergen Reactions     Animal Dander      Cats      Cockroach      Mold      Seasonal Allergies      Adhesive Tape-Silicones [Adhesive Tape] Rash     Was seen in the hospital and got rash where adhesive-tape was placed            PHYSICAL EXAM:        HEAD: Normal appearance and symmetry:  No cutaneous lesions.      EARS:        RIGHT:  {external auditory canals clear, tympanic " membranes normal   LEFT:    {external auditory canals clear, tympanic membranes normal     NOSE:    Dorsum:   straight       ORAL CAVITY/OROPHARYNX:    Lips:  Normal.     NECK:  Trachea:  midline     NEURO:   Alert and Oriented    GAIT AND STATION:  normal     RESPIRATORY:   Symmetry and Respiratory effort    PSYCH:   normal mood and affect    SKIN:  warm and dry         IMPRESSION:   Encounter Diagnoses   Name Primary?     Dizziness Yes     TMJ (temporomandibular joint syndrome)      Ear fullness, right      Normal hearing test             RECOMMENDATIONS:    TMJ precautions  Return as needed.        Again, thank you for allowing me to participate in the care of your patient.        Sincerely,        Justin Dhillon MD

## 2023-06-09 ENCOUNTER — OFFICE VISIT (OUTPATIENT)
Dept: SLEEP MEDICINE | Facility: CLINIC | Age: 48
End: 2023-06-09
Attending: NURSE PRACTITIONER
Payer: COMMERCIAL

## 2023-06-09 VITALS
BODY MASS INDEX: 36.36 KG/M2 | SYSTOLIC BLOOD PRESSURE: 105 MMHG | HEART RATE: 54 BPM | WEIGHT: 185.2 LBS | DIASTOLIC BLOOD PRESSURE: 69 MMHG | OXYGEN SATURATION: 100 % | HEIGHT: 60 IN

## 2023-06-09 DIAGNOSIS — G47.33 OBSTRUCTIVE SLEEP APNEA: Primary | ICD-10-CM

## 2023-06-09 DIAGNOSIS — R06.83 SNORING: ICD-10-CM

## 2023-06-09 PROCEDURE — 99204 OFFICE O/P NEW MOD 45 MIN: CPT | Performed by: STUDENT IN AN ORGANIZED HEALTH CARE EDUCATION/TRAINING PROGRAM

## 2023-06-09 ASSESSMENT — SLEEP AND FATIGUE QUESTIONNAIRES
HOW LIKELY ARE YOU TO NOD OFF OR FALL ASLEEP WHILE SITTING QUIETLY AFTER LUNCH WITHOUT ALCOHOL: SLIGHT CHANCE OF DOZING
HOW LIKELY ARE YOU TO NOD OFF OR FALL ASLEEP WHILE SITTING AND READING: SLIGHT CHANCE OF DOZING
HOW LIKELY ARE YOU TO NOD OFF OR FALL ASLEEP WHILE SITTING INACTIVE IN A PUBLIC PLACE: WOULD NEVER DOZE
HOW LIKELY ARE YOU TO NOD OFF OR FALL ASLEEP WHILE LYING DOWN TO REST IN THE AFTERNOON WHEN CIRCUMSTANCES PERMIT: HIGH CHANCE OF DOZING
HOW LIKELY ARE YOU TO NOD OFF OR FALL ASLEEP WHILE SITTING AND TALKING TO SOMEONE: WOULD NEVER DOZE
HOW LIKELY ARE YOU TO NOD OFF OR FALL ASLEEP WHILE WATCHING TV: SLIGHT CHANCE OF DOZING
HOW LIKELY ARE YOU TO NOD OFF OR FALL ASLEEP WHEN YOU ARE A PASSENGER IN A CAR FOR AN HOUR WITHOUT A BREAK: MODERATE CHANCE OF DOZING
HOW LIKELY ARE YOU TO NOD OFF OR FALL ASLEEP IN A CAR, WHILE STOPPED FOR A FEW MINUTES IN TRAFFIC: WOULD NEVER DOZE

## 2023-06-09 NOTE — PATIENT INSTRUCTIONS
"MY INFORMATION ON SLEEP APNEA-  Toma White    DOCTOR : Nabila Goldstein MD  SLEEP CENTER :      MY CONTACT NUMBER:    [facility]   [phone]  [facility]      Am I having a home sleep study?  Watch the video for the device you are using:  /drop off device, Noxturnal T-3: https://www.iContact.com/watch?v=yGGFBdELGhk        Frequently asked questions:  1. What is Obstructive Sleep Apnea (CHAGO)? CHAGO is the most common type of sleep apnea. Apnea means, \"without breath.\"  Apnea is most often caused by narrowing or collapse of the upper airway as muscles relax during sleep.   Almost everyone has occasional apneas. Most people with sleep apnea have had brief interruptions at night frequently for many years.  The severity of sleep apnea is related to how frequent and severe the events are.   Apneas are any events where there is no breathing.  Hypopneas are any events where there is shallow breathing. Sleep studies will track and then add all of the apneas and hypopneas into a total and then divided this number by the total time asleep to obtain the apnea hypopnea index(AHI). 0-5 events/per hour = No Sleep Apnea; 5-15 events/per hour = Mild Sleep Apnea; 15-30 events/per hour = Moderate Sleep Apnea; Greater than 30 events/per hour = Severe Sleep Apnea.  Sleep apnea is typically worse during REM sleep due to complete muscle relaxation, including the muscles of the airway. Sleep apnea is also typically worse during supine(sleeping on your back) sleep due to internal structures more easily falling on the top of the airway and external pressures more easily crushing the airway.  The respiratory disturbance index(RDI) includes apneas, hypopneas, and other respiratory events such as snoring that may disturb your sleep.  2. What are the consequences of CHAGO? Symptoms include: feeling sleepy during the day, snoring loudly, gasping or stopping of breathing, trouble " sleeping, and occasionally morning headaches or heartburn at night.  Sleepiness can be serious and even increase the risk of falling asleep while driving. Other health consequences may include development of high blood pressure and other cardiovascular disease in persons who are susceptible. Untreated CHAGO  can contribute to heart disease, stroke and diabetes.   3. What are the treatment options? In most situations, sleep apnea is a lifelong disease that must be managed with daily therapy. Medications are not effective for sleep apnea and surgery is generally not considered until other therapies have been tried. Your treatment is your choice . Continuous Positive Airway (CPAP) works right away and is the therapy that is effective in nearly everyone. An oral device to hold your jaw forward is usually the next most reliable option. Other options include postioning devices (to keep you off your back), weight loss, and surgery including a tongue pacing device. There is more detail about some of these options below.    Important tips for using CPAP and similar devices   Know your equipment:  CPAP is continuous positive airway pressure that prevents obstructive sleep apnea by keeping the throat from collapsing while you are sleeping. In most cases, the device is  smart  and can slowly self-adjusts if your throat collapses and keeps a record every day of how well you are treated-this information is available to you and your care team.  BPAP is bilevel positive airway pressure that keeps your throat open and also assists each breath with a pressure boost to maintain adequate breathing.  Special kinds of BPAP are used in patients who have inadequate breathing from lung or heart disease. In most cases, the device is  smart  and can slowly self-adjusts to assist breathing. Like CPAP, the device keeps a record of how well you are treated.  Your mask is your connection to the device. You get to choose what feels most comfortable  and the staff will help to make sure if fits. Here: are some examples of the different masks that are available:       Key points to remember on your journey with sleep apnea:  Sleep study.  PAP devices often need to be adjusted during a sleep study to show that they are effective and adjusted right.  Good tips to remember: Try wearing just the mask during a quiet time during the day so your body adapts to wearing it. A humidifier is recommended for comfort in most cases to prevent drying of your nose and throat. Allergy medication from your provider may help you if you are having nasal congestion.  Getting settled-in. It takes more than one night for most of us to get used to wearing a mask. Try wearing just the mask during a quiet time during the day so your body adapts to wearing it. A humidifier is recommended for comfort in most cases. Our team will work with you carefully on the first day and will be in contact within 4 days and again at 2 and 4 weeks for advice and remote device adjustments. Your therapy is evaluated by the device each day.   Use it every night. The more you are able to sleep naturally for 7-8 hours, the more likely you will have good sleep and to prevent health risks or symptoms from sleep apnea. Even if you use it 4 hours it helps. Occasionally all of us are unable to use a medical therapy, in sleep apnea, it is not dangerous to miss one night.   Communicate. Call our skilled team on the number provided on the first day if your visit for problems that make it difficult to wear the device. Over 2 out of 3 patients can learn to wear the device long-term with help from our team. Remember to call our team or your sleep providers if you are unable to wear the device as we may have other solutions for those who cannot adapt to mask CPAP therapy. It is recommended that you sleep your sleep provider within the first 3 months and yearly after that if you are not having problems.   Take care of your  equipment. Make sure you clean your mask and tubing using directions every day and that your filter and mask are replaced as recommended or if they are not working.     BESIDES CPAP, WHAT OTHER THERAPIES ARE THERE?    Positioning Device  Positioning devices are generally used when sleep apnea is mild and only occurs on your back.This example shows a pillow that straps around the waist. It may be appropriate for those whose sleep study shows milder sleep apnea that occurs primarily when lying flat on one's back. Preliminary studies have shown benefit but effectiveness at home may need to be verified by a home sleep test. These devices are generally not covered by medical insurance.    Oral Appliance  What is oral appliance therapy?  An oral appliance device fits on your teeth at night like a retainer used after having braces. The device is made by a specialized dentist and requires several visits over 1-2 months before a manufactured device is made to fit your teeth and is adjusted to prevent your sleep apnea. Once an oral device is working properly, snoring should be improved. A home sleep test may be recommended at that time if to determine whether the sleep apnea is adequately treated.       Some things to remember:  -Oral devices are often, but not always, covered by your medical insurance. Be sure to check with your insurance provider.   -If you are referred for oral therapy, you will be given a list of specialized dentists to consider or you may choose to visit the Web site of the American Academy of Dental Sleep Medicine  -Oral devices are less likely to work if you have severe sleep apnea or are extremely overweight.     More detailed information  An oral appliance is a small acrylic device that fits over the upper and lower teeth  (similar to a retainer or a mouth guard). This device slightly moves jaw forward, which moves the base of the tongue forward, opens the airway, improves breathing for effective  treat snoring and obstructive sleep apnea in perhaps 7 out of 10 people .  The best working devices are custom-made by a dental device  after a mold is made of the teeth 1, 2, 3.  When is an oral appliance indicated?  Oral appliance therapy is recommended as a first-line treatment for patients with primary snoring, mild sleep apnea, and for patients with moderate sleep apnea who prefer appliance therapy to use of CPAP4, 5. Severity of sleep apnea is determined by sleep testing and is based on the number of respiratory events per hour of sleep.   How successful is oral appliance therapy?  The success rate of oral appliance therapy in patients with mild sleep apnea is 75-80% while in patients with moderate sleep apnea it is 50-70%. The chance of success in patients with severe sleep apnea is 40-50%. The research also shows that oral appliances have a beneficial effect on the cardiovascular health of CHAGO patients at the same magnitude as CPAP therapy7.  Oral appliances should be a second-line treatment in cases of severe sleep apnea, but if not completely successful then a combination therapy utilizing CPAP plus oral appliance therapy may be effective. Oral appliances tend to be effective in a broad range of patients although studies show that the patients who have the highest success are females, younger patients, those with milder disease, and less severe obesity. 3, 6.   Finding a dentist that practices dental sleep medicine  Specific training is available through the American Academy of Dental Sleep Medicine for dentists interested in working in the field of sleep. To find a dentist who is educated in the field of sleep and the use of oral appliances, near you, visit the Web site of the American Academy of Dental Sleep Medicine.    References  1. Arnold et al. Objectively measured vs self-reported compliance during oral appliance therapy for sleep-disordered breathing. Chest 2013; 144(5):  2764-9828.  2. Tico et al. Objective measurement of compliance during oral appliance therapy for sleep-disordered breathing. Thorax 2013; 68(1): 91-96.  3. Bassam et al. Mandibular advancement devices in 620 men and women with CHAGO and snoring: tolerability and predictors of treatment success. Chest 2004; 125: 1283-8117.  4. Maged, et al. Oral appliances for snoring and CHAGO: a review. Sleep 2006; 29: 244-262.  5. Jennifer et al. Oral appliance treatment for CHAGO: an update. J Clin Sleep Med 2014; 10(2): 215-227.  6. Piotr et al. Predictors of OSAH treatment outcome. J Dent Res 2007; 86: 1427-0867.      Weight Loss:    Weight loss is a long-term strategy that may improve sleep apnea in some patients.    Weight management is a personal decision.  If you are interested in exploring weight loss strategies, the following discussion covers the impact on weight loss on sleep apnea and the approaches that may be successful.    Weight loss decreases severity of sleep apnea in most people with obesity. For those with mild obesity who have developed snoring with weight gain, even 15-30 pound weight loss can improve and occasionally eliminate sleep apnea.  Structured and life-long dietary and health habits are necessary to lose weight and keep healthier weight levels.     Though there may be significant health benefits from weight loss, long-term weight loss is very difficult to achieve- studies show success with dietary management in less than 10% of people. In addition, substantial weight loss may require years of dietary control and may be difficult if patients have severe obesity. In these cases, surgical management may be considered.  Finally, older individuals who have tolerated obesity without health complications may be less likely to benefit from weight loss strategies.    Your BMI is Body mass index is 35.87 kg/m .    Weight management plan: Discussed healthy diet and exercise  guidelines    Surgery:    Surgery for obstructive sleep apnea is considered generally only when other therapies fail to work. Surgery may be discussed with you if you are having a difficult time tolerating CPAP and or when there is an abnormal structure that requires surgical correction.  Nose and throat surgeries often enlarge the airway to prevent collapse.  Most of these surgeries create pain for 1-2 weeks and up to half of the most common surgeries are not effective throughout life.  You should carefully discuss the benefits and drawbacks to surgery with your sleep provider and surgeon to determine if it is the best solution for you.   More information  Surgery for CHAGO is directed at areas that are responsible for narrowing or complete obstruction of the airway during sleep.  There are a wide range of procedures available to enlarge and/or stabilize the airway to prevent blockage of breathing in the three major areas where it can occur: the palate, tongue, and nasal regions.  Successful surgical treatment depends on the accurate identification of the factors responsible for obstructive sleep apnea in each person.  A personalized approach is required because there is no single treatment that works well for everyone.  Because of anatomic variation, consultation with an examination by a sleep surgeon is a critical first step in determining what surgical options are best for each patient.  In some cases, examination during sedation may be recommended in order to guide the selection of procedures.  Patients will be counseled about risks and benefits as well as the typical recovery course after surgery. Surgery is typically not a cure for a person s CHAGO.  However, surgery will often significantly improve one s CHAGO severity (termed  success rate ).  Even in the absence of a cure, surgery will decrease the cardiovascular risk associated with OSA7; improve overall quality of life8 (sleepiness, functionality, sleep  quality, etc).      Palate Procedures:  Patients with CHAGO often have narrowing of their airway in the region of their tonsils and uvula.  The goals of palate procedures are to widen the airway in this region as well as to help the tissues resist collapse.  Modern palate procedure techniques focus on tissue conservation and soft tissue rearrangement, rather than tissue removal.  Often the uvula is preserved in this procedure. Residual sleep apnea is common in patient after pharyngoplasty with an average reduction in sleep apnea events of 33%2.      Tongue Procedures:  ExamWhile patients are awake, the muscles that surround the throat are active and keep this region open for breathing. These muscles relax during sleep, allowing the tongue and other structures to collapse and block breathing.  There are several different tongue procedures available.  Selection of a tongue base procedure depends on characteristics seen on physical exam.  Generally, procedures are aimed at removing bulky tissues in this area or preventing the back of the tongue from falling back during sleep.  Success rates for tongue surgery range from 50-62%3.    Hypoglossal Nerve Stimulation:  Hypoglossal nerve stimulation has recently received approval from the [place] Food and Drug Administration for the treatment of obstructive sleep apnea.  This is based on research showing that the system was safe and effective in treating sleep apnea6.  Results showed that the median AHI score decreased 68%, from 29.3 to 9.0. This therapy uses an implant system that senses breathing patterns and delivers mild stimulation to airway muscles, which keeps the airway open during sleep.  The system consists of three fully implanted components: a small generator (similar in size to a pacemaker), a breathing sensor, and a stimulation lead.  Using a small handheld remote, a patient turns the therapy on before bed and off upon awakening.    Candidates for this  device must be greater than 22 years of age, have moderate to severe CHAGO (AHI between 20-65), BMI less than 32, have tried CPAP/oral appliance without success, and have appropriate upper airway anatomy (determined by a sleep endoscopy performed by Dr. Gold).    Hypoglossal Nerve Stimulation Pathway:    The sleep surgeon s office will work with the patient through the insurance prior-authorization process (including communications and appeals).    Nasal Procedures:  Nasal obstruction can interfere with nasal breathing during the day and night.  Studies have shown that relief of nasal obstruction can improve the ability of some patients to tolerate positive airway pressure therapy for obstructive sleep apnea1.  Treatment options include medications such as nasal saline, topical corticosteroid and antihistamine sprays, and oral medications such as antihistamines or decongestants. Non-surgical treatments can include external nasal dilators for selected patients. If these are not successful by themselves, surgery can improve the nasal airway either alone or in combination with these other options.      Combination Procedures:  Combination of surgical procedures and other treatments may be recommended, particularly if patients have more than one area of narrowing or persistent positional disease.  The success rate of combination surgery ranges from 66-80%2,3.    References  Angel PEDERSEN. The Role of the Nose in Snoring and Obstructive Sleep Apnoea: An Update.  Eur Arch Otorhinolaryngol. 2011; 268: 1365-73.   Marybeth SM; Abel JA; Herrera JR; Pallanch JF; Ashutosh MB; Reji SG; Aiyana JENKINS. Surgical modifications of the upper airway for obstructive sleep apnea in adults: a systematic review and meta-analysis. SLEEP 2010;33(10):8172-5117. Yadi PATTERSON. Hypopharyngeal surgery in obstructive sleep apnea: an evidence-based medicine review.  Arch Otolaryngol Head Neck Surg. 2006 Feb;132(2):206-13.  Rod YH1, Zina Y, Bang VERA. The  efficacy of anatomically based multilevel surgery for obstructive sleep apnea. Otolaryngol Head Neck Surg. 2003 Oct;129(4):327-35.  Yadi PATTERSON, Goldberg A. Hypopharyngeal Surgery in Obstructive Sleep Apnea: An Evidence-Based Medicine Review. Arch Otolaryngol Head Neck Surg. 2006 Feb;132(2):206-13.  Dontrell PJ et al. Upper-Airway Stimulation for Obstructive Sleep Apnea.  N Engl J Med. 2014 Jan 9;370(2):139-49.  Georgi Y et al. Increased Incidence of Cardiovascular Disease in Middle-aged Men with Obstructive Sleep Apnea. Am J Respir Crit Care Med; 2002 166: 159-165  Dawson EM et al. Studying Life Effects and Effectiveness of Palatopharyngoplasty (SLEEP) study: Subjective Outcomes of Isolated Uvulopalatopharyngoplasty. Otolaryngol Head Neck Surg. 2011; 144: 623-631.

## 2023-06-09 NOTE — PROGRESS NOTES
[facility]  Consultation Note    Name: Toma White MRN#: [id number]   Age: 48 year old YOB: 1975     Date of Consultation: 06/09/23  Consultation is requested by: Yanet Garcia  Primary care provider:  [facility]    History of Present Illness:   Toma White is a 48 year old female patient with chronic TMJ pain, GERD, and obesity  She is sent by Yanet Garcia for a sleep consultation regarding Consult (Snoring )  .    Toma White main reason for visit: snore alot at night  Patient states problem(s) started: a year ago  Toma White's goals for this visit: want to know what is wrong and possible treatmen    She has Not had a previous sleep study.    CPAP: No    SLEEP-WAKE SCHEDULE:   Work/School Days: Patient goes to school/work: No   Usually gets into bed at    Takes patient about less than 10 minute depending on theday to fall asleep  Has trouble falling asleep notreally nights per week  Wakes up in the middle of the night may once times.  Wakes up due to Use the bathroom  She has trouble falling back asleep   times a week.   It usually takes   to get back to sleep  Patient is usually up at 7 am  Uses alarm: Yes  Sleep Inertia: No    Weekends/Non-work Days/All Other Days:  Usually gets into bed at depends   Takes patient about 10 minutes to fall asleep  Patient is usually up at 7am  Uses alarm: Yes    Sleep Need  Patient gets  atleast 7 hours sleep on average   Patient thinks She needs about atleast 8 hours sleep    Toma White prefers to sleep in this position(s): Side;Head Elevated   Patient states they do the following activities in bed: Use phone, computer, or tablet    Naps  Patient takes a purposeful nap   times a week and naps are usually sometimes in duration  She feels better after a nap: Yes  She dozes off unintentionally rarely days per week  Patient has had a driving accident or near-miss due to sleepiness/drowsiness:  No      SLEEP DISRUPTIONS:  Breathing/Snoring  Patient snores:   Other people complain about Her snoring: Yes  Patient has been told She stops breathing in Her sleep:No  She has issues with the following:      Movement:  Patient gets pain, discomfort, with an urge to move:  No  It happens when She is resting:  No  It happens more at night:  No  Patient has been told Toma White kicks Her legs at night:  No     Behaviors in Sleep:  Toma White has experienced the following behaviors while sleeping: Teeth grinding  She has experienced sudden muscle weakness during the day:         Is there anything else you would like your sleep provider to know:        CAFFEINE AND OTHER SUBSTANCES:  Patient consumes caffeinated beverages per day:  2  Last caffeine use is usually: morning and night  List of any prescribed or over the counter stimulants that patient takes: none  List of any prescribed or over the counter sleep medication patient takes: none  List of previous sleep medications that patient has tried: none  Patient drinks alcohol to help them sleep: No  Patient drinks alcohol near bedtime: No    Family History:  Patient has a family member been diagnosed with a sleep disorder: No            SCALES:  EPWORTH SLEEPINESS SCALE       6/9/2023     1:24 PM    [place] Sleepiness Scale ( ELSA Ferro  [id number]<br>ESS - [place]/English - Final version - 21 Nov 07 - [facility] [place].)   Sitting and reading Slight chance of dozing   Watching TV Slight chance of dozing   Sitting, inactive in a public place (e.g. a theatre or a meeting) Would never doze   As a passenger in a car for an hour without a break Moderate chance of dozing   Lying down to rest in the afternoon when circumstances permit High chance of dozing   Sitting and talking to someone Would never doze   Sitting quietly after a lunch without alcohol Slight chance of dozing   In a car, while stopped for a few minutes in traffic Would never doze   [place] Score  "() 8   Oxford Score (Sleep) 8       INSOMNIA SEVERITY INDEX (MAMI)        6/9/2023     1:06 PM   Insomnia Severity Index (MAMI)   Difficulty falling asleep 0   Difficulty staying asleep 0   Problems waking up too early 0   How SATISFIED/DISSATISFIED are you with your CURRENT sleep pattern? 2   How NOTICEABLE to others do you think your sleep problem is in terms of impairing the quality of your life? 3   How WORRIED/DISTRESSED are you about your current sleep problem? 3   To what extent do you consider your sleep problem to INTERFERE with your daily functioning (e.g. daytime fatigue, mood, ability to function at work/daily chores, concentration, memory, mood, etc.) CURRENTLY? 2   MAMI Total Score 10    Guidelines for Scoring/Interpretation:  Total score categories:  0-7 = No clinically significant insomnia   8-14 = Subthreshold insomnia   15-21 = Clinical insomnia (moderate severity)  22-28 = Clinical insomnia (severe)  Used via courtesy of www.Talk Local.va.gov with permission from Shaun Batista PhD., [facility]      STOP BANG   CHAGO Medium Risk            Total Score: 3    CHAGO: Snores loudly    CHAGO: Often tired    CHAGO: Observed stopped breathing          GAD7       View : No data to display.                  CAGE-AID       View : No data to display.              CAGE-AID reprinted with permission from the [place] Medical Journal, PAULA Dhillon. and DARIO Gandhi, \"Conjoint screening questionnaires for alcohol and drug abuse\" [place] Medical Journal 94: 135-140, 1995.      PATIENT HEALTH QUESTIONNAIRE-9 (PHQ - 9)       View : No data to display.              Developed by Dago Carrizales, Kaur Baca, Dez Bryson and colleagues, with an educational maria esther from [employer] No permission required to reproduce, translate, display or distribute.      Allergies:    Allergies   Allergen Reactions     Animal Dander      Cats      Cockroach      Mold      Seasonal Allergies      Adhesive Tape-Silicones " [Adhesive Tape] Rash     Was seen in the hospital and got rash where adhesive-tape was placed        Medications:    No current outpatient medications on file.       Problem List:  Patient Active Problem List    Diagnosis Date Noted     Obstructive sleep apnea 06/09/2023     Priority: Medium     Obesity (BMI 35.0-39.9) with comorbidity (H) 05/18/2021     Priority: Medium     Dysplasia of cervix, high grade WILLIS 2      Priority: Medium     08/14/12 ASC-H Pap, + HR HPV type 31  08/27/12 Anderson Bx WILLIS 1 and 2  02/15/13 NIL Pap, Neg HR HPV   10/24/13 NIL Pap, Neg HR HPV   01/16/17 NIL Pap, Neg HR HPV (abstracted records)  04/17/18 NIL Pap, Neg HR HPV   10/31/22 NIL Pap, Neg HR HPV Plan cotest in 3 years, pt will need cotesting every 3 years until 2037.         Herpes Simplex Type II      Priority: Medium     Created by Conversion  Replacement Utility updated for latest IMO load         Helicobacter Pylori (H. Pylori) Infection      Priority: Medium     Created by Conversion  [facility] Annotation: Dec 18 2012  4:52PM - Yandy Shell: 12/12  Replacement Utility updated for latest IMO load         Umbilical Hernia      Priority: Medium     Created by Conversion  Replacement Utility updated for latest IMO load         Herpes Simplex Type I      Priority: Medium     Created by Conversion  Replacement Utility updated for latest IMO load         Conductive Hearing Loss      Priority: Medium     Created by Conversion  Replacement Utility updated for latest IMO load         Vitamin D deficiency 05/05/2016     Priority: Medium     Esophageal Reflux      Priority: Medium     Created by Conversion         Impaired Glucose Tolerance Test      Priority: Medium     Created by Conversion         Human Papilloma Virus Infection      Priority: Medium     Created by Conversion         Cerv Pap Smear (+) Atyp Squamous Cells Undetermined Signif      Priority: Medium     Created by Conversion            Past Medical/Surgical History:  Past  Medical History:   Diagnosis Date     Abnormal Pap smear of cervix      Anemia      Herpes      Past Surgical History:   Procedure Laterality Date      SECTION       OK LAP,TUBAL CAUTERY Bilateral 2018    Procedure: LAPAROSCOPIC BILATERAL SALPINGECTOMY;  Surgeon: Kylah Rivas MD;  Location: [facility];  Service: Gynecology     [facility]  DELIVERY ONLY      Description:  Section;  Recorded: 2011;       Social History:  Social History     Socioeconomic History     Marital status:      Spouse name: Not on file     Number of children: Not on file     Years of education: Not on file     Highest education level: Not on file   Occupational History     Not on file   Tobacco Use     Smoking status: Never     Passive exposure: Never     Smokeless tobacco: Never   Vaping Use     Vaping status: Never Used   Substance and Sexual Activity     Alcohol use: No     Drug use: No     Sexual activity: Yes     Partners: Male   Other Topics Concern     Not on file   Social History Narrative     Not on file     Social Determinants of Health     Financial Resource Strain: Not on file   Food Insecurity: Not on file   Transportation Needs: Not on file   Physical Activity: Not on file   Stress: Not on file   Social Connections: Not on file   Intimate Partner Violence: Not on file   Housing Stability: Not on file       Family History:  Family History   Problem Relation Age of Onset     No Known Problems Mother      No Known Problems Father        Review of Systems:   A complete review of systems reviewed by me is negative with the exeption of what has been mentioned in the history of present illness.  In the last TWO WEEKS have you experienced any of the following symptoms?  Fevers: No  Night Sweats: No  Weight Gain: No  Pain at Night: No  Double Vision: No  Changes in Vision: No  Difficulty Breathing through Nose: Yes  Sore Throat in Morning: No  Dry Mouth in the Morning: No  Shortness  "of Breath Lying Flat: No  Shortness of Breath With Activity: No  Awakening with Shortness of Breath: No  Increased Cough: No  Heart Racing at Night: No  Swelling in Feet or Legs: Yes  Losing Control of Urine at Night: No  Joint Pains at Night: No  Headaches in Morning: No  Weakness in Arms or Legs: No  Depressed Mood: No  Anxiety: No    Physical Exam:   Vitals: /69   Pulse 54   Ht 1.53 m (5' 0.25\")   Wt 84 kg (185 lb 3.2 oz)   SpO2 100%   BMI 35.87 kg/m    BMI= Body mass index is 35.87 kg/m .     Mandibular profile: No retrognathia  Mallampati Class: II.  Tonsillar Stage: 2  visible at pillars.  GENERAL: Healthy, alert and no distress  EYES: Eyes grossly normal to inspection.  No discharge or erythema, or obvious scleral/conjunctival abnormalities.  RESP: No audible wheeze, cough, or visible cyanosis.  No visible retractions or increased work of breathing.    SKIN: Visible skin clear. No significant rash, abnormal pigmentation or lesions.  NEURO: Cranial nerves grossly intact.  Mentation and speech appropriate for age.  PSYCH: Mentation appears normal, affect normal/bright, judgement and insight intact, normal speech and appearance well-groomed.         Data: All pertinent previous laboratory data reviewed     Recent Labs   Lab Test 10/31/22  0915 10/14/21  1036    141   POTASSIUM 3.9 3.8   CHLORIDE 107 107   CO2 24 27   ANIONGAP 9 7   * 92   BUN 14.6 12   CR 0.78 0.77   MARIO 9.2 9.7       Recent Labs   Lab Test 10/31/22  0915 05/20/21  0732 02/09/21  1128   WBC  --   --  6.3   RBC  --   --  4.60   HGB 12.4   < > 12.4   HCT  --   --  38.9   MCV  --   --  85   MCH  --   --  27.0   MCHC  --   --  31.9*   RDW  --   --  13.5   PLT  --   --  198    < > = values in this interval not displayed.       Recent Labs   Lab Test 10/14/21  1036   PROTTOTAL 7.5   ALBUMIN 4.0   BILITOTAL 0.7   ALKPHOS 65   AST 26   ALT 26       TSH (uIU/mL)   Date Value   10/14/2021 0.95   02/09/2021 1.20       No results " found for: UAMP, UBARB, BENZODIAZEUR, UCANN, UCOC, OPIT, UPCP    No results found for: IRONSAT, [id number], TERRANCE    No results found for: PH, PHARTERIAL, PO2, RE5QGQTEQMK, SAT, PCO2, HCO3, BASEEXCESS, REEMA, BEB    @LABRCNTIPR(phv:4,pco2v:4,po2v:4,hco3v:4,jose:4,o2per:4)@    Echocardiology: No results found for this or any previous visit (from the past 4320 hour(s)).    Chest x-ray: No results found for this or any previous visit from the past 365 days.      Chest CT: No results found for this or any previous visit from the past 365 days.      PFT: Most Recent Breeze Pulmonary Function Testing  No results found    Assessment and Plan:     Problem List Items Addressed This Visit        Respiratory    Obstructive sleep apnea - Primary     STOP BANG score is 3/8. Patient likely has sleep apnea based on clinical history of snoring, EDS, observed apneas, insomnia, and nocturia.   Obstructive sleep apnea reviewed. Complications of Untreated Sleep Apnea Reviewed.  HST/ Polysomnography reviewed. Information provided regarding treatment options for CHAGO.    Recommend HST to evaluate for CHAGO         Relevant Orders    HST - Home Sleep Apnea Test - Noxturnal, T-3 Returnable   Other Visit Diagnoses     Snoring            Patient was strongly advised to avoid driving, operating any heavy machinery or other hazardous situations while drowsy or sleepy.  Patient was counseled on the importance of driving while alert, to pull over if drowsy, or nap before getting into the vehicle if sleepy.      Plan for Toma White to follow up TBD following HST.    The above note was dictated using voice recognition software. Although reviewed after completion, some word and grammatical error may remain . Please contact the author for any clarifications.    Total time spent reviewing medical records, history and physical examination, review of previous testing and interpretation as well as documentation on this date, 06/09/23: 45 minutes    Nabila  MD Angelita on 10/20/2022   [facility] Professional Building   Floor 1, [address]   [address] [place], [place] [address], [place] [address]   Appointments: [phone] [facility] Professional [facility]   Floor 1, [address]   [address] [place] [address]   Appointments: [phone]     CC: Yanet Archers

## 2023-06-09 NOTE — ASSESSMENT & PLAN NOTE
STOP BANG score is 3/8. Patient likely has sleep apnea based on clinical history of snoring, EDS, observed apneas, insomnia, and nocturia.   Obstructive sleep apnea reviewed. Complications of Untreated Sleep Apnea Reviewed.  HST/ Polysomnography reviewed. Information provided regarding treatment options for CHAGO.    Recommend HST to evaluate for CHAGO

## 2023-06-09 NOTE — NURSING NOTE
"Chief Complaint   Patient presents with     Consult     Snoring        Initial /69   Pulse 54   Ht 1.53 m (5' 0.25\")   Wt 84 kg (185 lb 3.2 oz)   SpO2 100%   BMI 35.87 kg/m   Estimated body mass index is 35.87 kg/m  as calculated from the following:    Height as of this encounter: 1.53 m (5' 0.25\").    Weight as of this encounter: 84 kg (185 lb 3.2 oz).    Medication Reconciliation: complete    Neck circumference:     DME: n/a    HST /drop off scheduled and results will be sent to [facility].      Fany Hays MA  "

## 2023-06-29 ENCOUNTER — OFFICE VISIT (OUTPATIENT)
Dept: SLEEP MEDICINE | Facility: CLINIC | Age: 48
End: 2023-06-29
Payer: COMMERCIAL

## 2023-06-29 ENCOUNTER — ANCILLARY PROCEDURE (OUTPATIENT)
Dept: GENERAL RADIOLOGY | Facility: CLINIC | Age: 48
End: 2023-06-29
Attending: FAMILY MEDICINE
Payer: COMMERCIAL

## 2023-06-29 ENCOUNTER — OFFICE VISIT (OUTPATIENT)
Dept: FAMILY MEDICINE | Facility: CLINIC | Age: 48
End: 2023-06-29
Payer: COMMERCIAL

## 2023-06-29 VITALS
SYSTOLIC BLOOD PRESSURE: 108 MMHG | OXYGEN SATURATION: 98 % | TEMPERATURE: 97.7 F | DIASTOLIC BLOOD PRESSURE: 74 MMHG | BODY MASS INDEX: 35.75 KG/M2 | RESPIRATION RATE: 16 BRPM | WEIGHT: 184.6 LBS | HEART RATE: 55 BPM

## 2023-06-29 DIAGNOSIS — G47.33 OBSTRUCTIVE SLEEP APNEA: ICD-10-CM

## 2023-06-29 DIAGNOSIS — S69.92XA INJURY OF FINGER OF LEFT HAND, INITIAL ENCOUNTER: Primary | ICD-10-CM

## 2023-06-29 DIAGNOSIS — S62.663A CLOSED NONDISPLACED FRACTURE OF DISTAL PHALANX OF LEFT MIDDLE FINGER, INITIAL ENCOUNTER: ICD-10-CM

## 2023-06-29 DIAGNOSIS — S69.92XA INJURY OF FINGER OF LEFT HAND, INITIAL ENCOUNTER: ICD-10-CM

## 2023-06-29 PROCEDURE — G0399 HOME SLEEP TEST/TYPE 3 PORTA: HCPCS

## 2023-06-29 PROCEDURE — 73140 X-RAY EXAM OF FINGER(S): CPT | Mod: TC | Performed by: RADIOLOGY

## 2023-06-29 PROCEDURE — 99213 OFFICE O/P EST LOW 20 MIN: CPT | Performed by: FAMILY MEDICINE

## 2023-06-29 NOTE — PROGRESS NOTES
A/P:      ICD-10-CM    1. Injury of finger of left hand, initial encounter  S69.92XA XR Finger Left G/E 2 Views     Orthopedic  Referral      2. Closed nondisplaced fracture of distal phalanx of left middle finger, initial encounter  S62.663A Orthopedic  Referral        Reviewed x-ray.  Foam splint placed.  Reviewed use of splint consistently unless bathing.  Ortho referral placed for ongoing fracture management    Viktoria Chua is a 48 year old, presenting for the following health issues:  Musculoskeletal Problem      Musculoskeletal Problem    History of Present Illness       Reason for visit:  Finger injury  Symptom onset:  1-2 weeks ago    She eats 2-3 servings of fruits and vegetables daily.She consumes 1 sweetened beverage(s) daily.She exercises with enough effort to increase her heart rate 20 to 29 minutes per day.  She exercises with enough effort to increase her heart rate 4 days per week.   She is taking medications regularly.       Injury occurred on 6/7.  Had the lid of her washing machine fall on the distal aspect of her L middle finger.  Had immediate pain at the sire.  Did not notice any deformity.  Iced the finger for a few days and it began feeling better.    Comes to clinic today because she has had some persistent symptoms and is worried there is a more serious injury.    Feels like the sensation to her finger tip is altered or decreased.  Can still feel things but feels different.  Also continues to have a lot of pain when using or pressing the the distal aspect of the middle finger.      Review of Systems         Objective    /74   Pulse 55   Temp 97.7  F (36.5  C) (Tympanic)   Resp 16   Wt 83.7 kg (184 lb 9.6 oz)   SpO2 98%   BMI 35.75 kg/m    Body mass index is 35.75 kg/m .  Physical Exam   PE:  VS as above   Gen:  WN/WD/WH female in NAD   MSK:  L middle finger with mild swelling overlying the distal phalanx, cap refill <3 seconds, sensation in the tip  intact to light touch.  Able to flex and extended the DIP actively and against resistance.  Increased pain with resisted flexion/extension.  Tender on palpation over the distal phalanx.      X-ray:  Nondisplaced fracture of the distal phalanx of the L middle finger per my visualization.

## 2023-06-29 NOTE — PROGRESS NOTES
Pt is completing a home sleep test. Pt was instructed on how to put on the Noxturnal T3 device and associated equipment before going to bed and given the opportunity to practice putting it on before leaving the sleep center. Pt was reminded to bring the home sleep test kit back to the center tomorrow, at agreed upon time for download and reporting.   Neck circumference: 34 CM / 13.25 inches.  Jania Giles CMA, HST Specialist  [facility] [facility]

## 2023-06-30 ENCOUNTER — TELEPHONE (OUTPATIENT)
Dept: ORTHOPEDICS | Facility: CLINIC | Age: 48
End: 2023-06-30

## 2023-06-30 ENCOUNTER — OFFICE VISIT (OUTPATIENT)
Dept: ORTHOPEDICS | Facility: CLINIC | Age: 48
End: 2023-06-30
Payer: COMMERCIAL

## 2023-06-30 ENCOUNTER — DOCUMENTATION ONLY (OUTPATIENT)
Dept: SLEEP MEDICINE | Facility: CLINIC | Age: 48
End: 2023-06-30
Payer: COMMERCIAL

## 2023-06-30 VITALS
BODY MASS INDEX: 36.12 KG/M2 | DIASTOLIC BLOOD PRESSURE: 73 MMHG | SYSTOLIC BLOOD PRESSURE: 110 MMHG | WEIGHT: 184 LBS | HEIGHT: 60 IN

## 2023-06-30 DIAGNOSIS — S62.663A CLOSED NONDISPLACED FRACTURE OF DISTAL PHALANX OF LEFT MIDDLE FINGER, INITIAL ENCOUNTER: Primary | ICD-10-CM

## 2023-06-30 DIAGNOSIS — S69.92XA INJURY OF FINGER OF LEFT HAND, INITIAL ENCOUNTER: ICD-10-CM

## 2023-06-30 PROCEDURE — 99243 OFF/OP CNSLTJ NEW/EST LOW 30: CPT | Performed by: PEDIATRICS

## 2023-06-30 NOTE — PROGRESS NOTES
ASSESSMENT & PLAN    Toma was seen today for fracture.    Diagnoses and all orders for this visit:    Closed nondisplaced fracture of distal phalanx of left middle finger, initial encounter      Possible future joint stiffness, arthrosis with intra-articular fracture. However, splinting is fine for this injury.  Splint options reviewed, including stax splint.  Questions answered. Discussed signs and symptoms that may indicate more serious issues; the patient was instructed to seek appropriate care if noted. Toma indicates understanding of these issues and agrees with the plan.        See Patient Instructions  Patient Instructions   We reviewed the subacute nondisplaced distal phalanx fracture of the left long finger.  Anticipate healing with time.  Reviewed support options.  Okay to continue with current AlumaFoam splint, versus shortening at.  Alternatively, we reviewed use of a stack splint for support.  Splint may be removed for hygiene primarily, and when at rest if icing.  Otherwise, okay to continue with routine use of the splint.  We discussed recheck in approximately 2 weeks, with repeat x-rays at that time.  Plan to focus on the long finger for repeat x-rays, though if there are other symptoms elsewhere in the hand or digits, we can obtain a full left hand x-ray series.  Contact clinic or follow-up sooner if needed.    If you have any further questions for your physician or physician s care team you can contact them thru MyChart or by calling [phone].        Tavares Mujica [facility]      CC: Dr Schwartz      -----  Chief Complaint   Patient presents with     Left Middle Finger - Fracture       SUBJECTIVE  Toma White is a/an 48 year old female who is seen in consultation as a referral from Dr Schwartz as a WALK IN patient for evaluation of Left middle finger.     The patient is seen by themselves.  The patient is Right handed    Onset: >3 weeks ago.  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  Additional history:  Injury 6/7/23. Had x-ray yesterday.  Prior to splint, was trying to remain active and do everything, and would have pain after activity.  Pt's understanding was that there was displacement of the fracture and that further intervention may be needed for the injury.      REVIEW OF SYSTEMS:  Review of Systems    OBJECTIVE:  /73   Ht 1.524 m (5')   Wt 83.5 kg (184 lb)   BMI 35.94 kg/m     General: healthy, alert and in no distress  Skin: no suspicious lesions or rash.  CV: distal perfusion intact   Resp: normal respiratory effort without conversational dyspnea   Psych: normal mood and affect  Gait: NORMAL  Neuro: Normal light sensory exam of extremity       Hand/wrist (left):    Inspection:  No deformity noted.  + distal long finger swelling. No clear ecchymosis.    Motion:  Digit motion extension grossly full long finger, mild limitation DIP joint flexion with stiffness, some pain  Has independent flexion, extension at DIP joint long finger    Strength:  deferred    Sensation:  Grossly intact light touch.    Radial pulses normal, +2/4, capillary refill brisk.    Palpation:  Tender DIP joint, distal phalanx long finger        RADIOLOGY:  Final results and radiologist's interpretation, available in the [facility] health record.  Images were reviewed with the patient in the office today.  My personal interpretation of the performed imaging: nondisplaced fracture proximal aspect long finger distal phalanx, appears to have small extension ulnar aspect DIP joint.      XR FINGER LEFT G/E 2 VIEWS 6/29/2023 10:47 AM      HISTORY: Injury  of finger of left hand, initial encounter     COMPARISON: None.                                                                      IMPRESSION: Acute nondisplaced fracture in the proximal metaphysis of  the middle finger distal phalanx with extension to the ulnar margin of  the DIP joint. Normal alignment.     RESHMA MOLINA MD

## 2023-06-30 NOTE — LETTER
6/30/2023         RE: Toma White  [address]  [facility] [address]        Dear Colleague,    Thank you for referring your patient, Toma White, to the [facility] SP[facility]TS MEDICINE [facility]. Please see a copy of my visit note below.    ASSESSMENT & PLAN    Toma was seen today for fracture.    Diagnoses and all orders for this visit:    Closed nondisplaced fracture of distal phalanx of left middle finger, initial encounter      Possible future joint stiffness, arthrosis with intra-articular fracture. However, splinting is fine for this injury.  Splint options reviewed, including stax splint.  Questions answered. Discussed signs and symptoms that may indicate more serious issues; the patient was instructed to seek appropriate care if noted. Toma indicates understanding of these issues and agrees with the plan.        See Patient Instructions  Patient Instructions   We reviewed the subacute nondisplaced distal phalanx fracture of the left long finger.  Anticipate healing with time.  Reviewed support options.  Okay to continue with current AlumaFoam splint, versus shortening at.  Alternatively, we reviewed use of a stack splint for support.  Splint may be removed for hygiene primarily, and when at rest if icing.  Otherwise, okay to continue with routine use of the splint.  We discussed recheck in approximately 2 weeks, with repeat x-rays at that time.  Plan to focus on the long finger for repeat x-rays, though if there are other symptoms elsewhere in the hand or digits, we can obtain a full left hand x-ray series.  Contact clinic or follow-up sooner if needed.    If you have any further questions for your physician or physician s care team you can contact them thru MyChart or by calling [phone].        Tavares Mujica DO  [facility] SPORTS MEDICINE [facility]      CC: Dr Schwartz      -----  Chief Complaint   Patient presents with     Left Middle Finger - Fracture        SUBJECTIVE  Toma White is a/an 48 year old female who is seen in consultation as a referral from Dr Schwartz as a WALK IN patient for evaluation of Left middle finger.     The patient is seen by themselves.  The patient is Right handed    Onset: >3 weeks ago. 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  Additional history:  Injury 6/7/23. Had x-ray yesterday.  Prior to splint, was trying to remain active and do everything, and would have pain after activity.  Pt's understanding was that there was displacement of the fracture and that further intervention may be needed for the injury.      REVIEW OF SYSTEMS:  Review of Systems    OBJECTIVE:  /73   Ht 1.524 m (5')   Wt 83.5 kg (184 lb)   BMI 35.94 kg/m     General: healthy, alert and in no distress  Skin: no suspicious lesions or rash.  CV: distal perfusion intact   Resp: normal respiratory effort without conversational dyspnea   Psych: normal mood and affect  Gait: NORMAL  Neuro: Normal light sensory exam of extremity       Hand/wrist (left):    Inspection:  No deformity noted.  + distal long finger swelling. No clear ecchymosis.    Motion:  Digit motion extension grossly full long finger, mild limitation DIP joint flexion with stiffness, some pain  Has independent flexion, extension at DIP joint long finger    Strength:  deferred    Sensation:  Grossly intact light touch.    Radial pulses normal, +2/4, capillary refill brisk.    Palpation:  Tender DIP joint, distal phalanx long finger        RADIOLOGY:  Final results and radiologist's interpretation, available in the [facility] health record.  Images were reviewed with the patient in  the office today.  My personal interpretation of the performed imaging: nondisplaced fracture proximal aspect long finger distal phalanx, appears to have small extension ulnar aspect DIP joint.      XR FINGER LEFT G/E 2 VIEWS 6/29/2023 10:47 AM      HISTORY: Injury of finger of left hand, initial encounter     COMPARISON: None.                                                                      IMPRESSION: Acute nondisplaced fracture in the proximal metaphysis of  the middle finger distal phalanx with extension to the ulnar margin of  the DIP joint. Normal alignment.     TAVARES MOLINA MD           Again, thank you for allowing me to participate in the care of your patient.        Sincerely,        Tavares Mujica DO

## 2023-06-30 NOTE — TELEPHONE ENCOUNTER
No orthopedic providers in clinic today.  Recommended patient go in to [place] acute injury orthopedic walk in clinic today to be evaluated.    Morena Dowling MSN, RN   Specialty Clinic, 6/30/2023 11:52 AM

## 2023-06-30 NOTE — PATIENT INSTRUCTIONS
We reviewed the subacute nondisplaced distal phalanx fracture of the left long finger.  Anticipate healing with time.  Reviewed support options.  Okay to continue with current AlumaFoam splint, versus shortening at.  Alternatively, we reviewed use of a stack splint for support.  Splint may be removed for hygiene primarily, and when at rest if icing.  Otherwise, okay to continue with routine use of the splint.  We discussed recheck in approximately 2 weeks, with repeat x-rays at that time.  Plan to focus on the long finger for repeat x-rays, though if there are other symptoms elsewhere in the hand or digits, we can obtain a full left hand x-ray series.  Contact clinic or follow-up sooner if needed.    If you have any further questions for your physician or physician s care team you can contact them thru [facility] [employer] or by calling [phone].

## 2023-06-30 NOTE — TELEPHONE ENCOUNTER
[facility] Call Center    Phone Message    May a detailed message be left on voicemail: yes     Reason for Call:Other: RED FLAG Toma called to schedule an appointment due to having a Closed nondisplaced fracture of distal phalanx of left middle finger, initial encounter and Injury of finger of left hand, initial encounter. She wants to come to [place] because it is closest to her. Per protocol TE sent to [facility] and contact patient to schedule. Thank you      Action Taken: Other: [facility] Sports medicine and [facility] Orthopedic Surgery    Travel Screening: Not Applicable

## 2023-07-03 NOTE — PROGRESS NOTES
HST POST-STUDY QUESTIONNAIRE    1. What time did you go to bed?  3878  2. How long do you think it took to fall asleep?  10 minutes  3. What time did you wake up to start the day?  0700  4. Did you get up during the night at all?  yes  5. If you woke up, do you remember approximately what time(s)? 0440, 0610, (just check time) didn't get out of bed  6. Did you have any difficulty with the equipment?  No  7. Did you us any type of treatment with this study?  None  8. Was the head of the bed elevated? No  9. Did you sleep in a recliner?  No  10. Did you stop using CPAP at least 3 days before this test?  NA  11. Any other information you'd like us to know? No      This HSAT was performed using a Noxturnal T3 device which recorded snore, sound, movement activity, body position, nasal pressure, oronasal thermal airflow, pulse, oximetry and both chest and abdominal respiratory effort. HSAT data was restricted to the time patient states they were in bed.     HSAT was scored using 1B 4% hypopnea rule.     HST AHI (Non-PAT): 1.4  Snoring was reported as intermittent.  Time with SpO2 below 89% was 0 minutes.   Overall signal quality was good     Pt will follow up with sleep provider to determine appropriate therapy.

## 2023-07-12 NOTE — PROCEDURES
HOME SLEEP STUDY INTERPRETATION        Patient: Toma White  MRN: [id number]  YOB: 1975  Study Date: 6/29/2023  PCP/Referring Provider: Kenny - [facility];   Ordering Provider: Nabila Goldstein MD      Indications for Home Study: Toma White is a 48 year old female with a history of chronic TMJ pain, GERD, and obesity who presents with symptoms suggestive of obstructive sleep apnea.    Estimated body mass index is 35.94 kg/m  as calculated from the following:    Height as of 6/30/23: 1.524 m (5').    Weight as of 6/30/23: 83.5 kg (184 lb).  Total score - [place]: 8 (6/9/2023  1:24 PM)  Total Score: 3 (6/9/2023  1:25 PM)        Data: A full night home sleep study was performed recording the standard physiologic parameters including body position, movement, sound, nasal pressure, thermal oral airflow, chest and abdominal movements with respiratory inductance plethysmography, and oxygen saturation by pulse oximetry. Pulse rate was estimated by oximetry recording. This study was considered adequate based on > 4 hours of quality oximetry and respiratory recording. As specified by the AASM Manual for the Scoring of Sleep and Associated events, version 2.3, Rule VIII.D 1B, 4% oxygen desaturation scoring for hypopneas is used as a standard of care on all home sleep apnea testing.        Analysis Time:  441.6 minutes        Respiration:   Sleep Associated Hypoxemia: sustained hypoxemia was not present. Baseline oxygen saturation was 94%.  Time with saturation less than or equal to 88% was 0 minutes. The lowest oxygen saturation was 89%.   Snoring: Snoring was present.  Respiratory events: The home study revealed a presence of 1 obstructive apneas and 1 mixed and central apneas. There were 8 hypopneas resulting in a combined apnea/hypopnea index [AHI] of 1.4 events per hour.  AHI was 1.1 per hour supine, 0 per hour prone, 2.4 per hour on left side, and 1.3 per hour on right side.    Pattern: Excluding events noted above, respiratory rate and pattern was Normal.      Position: Percent of time spent: supine - 62.3%, prone - 0%, on left - 16.9%, on right - 20.9%.      Heart Rate: By pulse oximetry normal rate was noted.       Assessment:     No significant obstructive sleep apnea.    However, the home sleep study may not be sensitive enough to determine the true severity of any sleep apnea that may possibly be present    Sleep associated hypoxemia was not present.    Recommendations:    Consider repeat sleep study(sleep lab polysomnography) or Cognitive Behavorial Therapy for Insomnia.    Suggest optimizing sleep hygiene and avoiding sleep deprivation.    Weight management.        Diagnosis Code(s): Snoring R06.83    Nabila Goldstein MD, July 12, 2023   Diplomate, American Board of Internal Medicine, Sleep Medicine

## 2023-09-29 ENCOUNTER — ANCILLARY PROCEDURE (OUTPATIENT)
Dept: GENERAL RADIOLOGY | Facility: CLINIC | Age: 48
End: 2023-09-29
Attending: PEDIATRICS
Payer: COMMERCIAL

## 2023-09-29 ENCOUNTER — OFFICE VISIT (OUTPATIENT)
Dept: ORTHOPEDICS | Facility: CLINIC | Age: 48
End: 2023-09-29
Payer: COMMERCIAL

## 2023-09-29 VITALS — HEIGHT: 60 IN | BODY MASS INDEX: 36.12 KG/M2 | WEIGHT: 184 LBS

## 2023-09-29 DIAGNOSIS — S62.663D CLOSED NONDISPLACED FRACTURE OF DISTAL PHALANX OF LEFT MIDDLE FINGER WITH ROUTINE HEALING, SUBSEQUENT ENCOUNTER: Primary | ICD-10-CM

## 2023-09-29 DIAGNOSIS — S62.663A CLOSED NONDISPLACED FRACTURE OF DISTAL PHALANX OF LEFT MIDDLE FINGER, INITIAL ENCOUNTER: ICD-10-CM

## 2023-09-29 PROCEDURE — 73140 X-RAY EXAM OF FINGER(S): CPT | Mod: TC | Performed by: RADIOLOGY

## 2023-09-29 PROCEDURE — 99213 OFFICE O/P EST LOW 20 MIN: CPT | Performed by: PEDIATRICS

## 2023-09-29 NOTE — PROGRESS NOTES
ASSESSMENT & PLAN    Toma was seen today for follow up.    Diagnoses and all orders for this visit:    Closed nondisplaced fracture of distal phalanx of left middle finger with routine healing, subsequent encounter  -     XR Finger LT G/E 2 vw; Future          See Patient Instructions  Patient Instructions   Updated x-rays today of the left long finger demonstrate good interval healing of the previously noted distal phalanx fracture.  Okay to discontinue the splint.  Okay to use the hand as comfortable doing so.  We discussed potential for referral to hand therapy, given some local sensitivity present around the end of the finger.  If interested in this, contact clinic.  Otherwise, follow-up is as needed.    If you have any further questions for your physician or physician s care team you can contact them thru [facility] or by calling [phone].      Tavares Mujica [facility] SP[facility]TS MEDICINE CLINIC [facility]    SUBJECTIVE- Interim History September 29, 2023    Chief Complaint   Patient presents with    Left Hand - Follow Up       Toma White is a 48 year old female who is seen in f/u up for Data Unavailable. Since last visit on 6/30/23 patient has discontinued the use of the alumafoam splint, but notes that she has been able to utilize her hand without complaint. Complains that there is soreness with pressure along the DIP.      The patient is seen by themselves.  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  Additional history:  Sometimes aching, and tender at DIP joint of left long finger.  Otherwise feels like can do all activities.      REVIEW OF SYSTEMS:  Review of Systems    OBJECTIVE:  Ht 1.524 m (5')   Wt 83.5 kg (184 lb)   BMI 35.94 kg/m       Left hand:  Grossly intact ROM long finger, no pain  Some tenderness distal long finger, near DIP joint    RADIOLOGY:  Final results and  radiologist's interpretation, available in the [facility] health record.  Images were reviewed with the patient in the office today.  My personal interpretation of the performed imaging: healing fracture distal phalanx, unchanged in alignment.      Recent Results (from the past 24 hour(s))   XR Finger LT G/E 2 vw    Narrative    FINGER LEFT TWO OR MORE VIEWS   9/29/2023 9:27 AM     HISTORY:  Closed nondisplaced fracture of distal phalanx of left  middle finger, initial encounter.    COMPARISON: Radiographs from 6/29/2023.      Impression    IMPRESSION: The fracture at the base of the distal phalanx of the long  finger is unchanged in position and alignment. It is less visible and  there appears to now be bridging callus/bone indicating that there has  been some healing. No other change.    ANAYA HENDRICKS MD         SYSTEM ID:  [id number]

## 2023-09-29 NOTE — PATIENT INSTRUCTIONS
Updated x-rays today of the left long finger demonstrate good interval healing of the previously noted distal phalanx fracture.  Okay to discontinue the splint.  Okay to use the hand as comfortable doing so.  We discussed potential for referral to hand therapy, given some local sensitivity present around the end of the finger.  If interested in this, contact clinic.  Otherwise, follow-up is as needed.    If you have any further questions for your physician or physician s care team you can contact them thru [employer] or by calling [phone].

## 2023-09-29 NOTE — LETTER
9/29/2023         RE: Toma White  [address]  [facility] [address]        Dear Colleague,    Thank you for referring your patient, Toma White, to the [facility] SPORTS [facility]. Please see a copy of my visit note below.    ASSESSMENT & PLAN    Toma was seen today for follow up.    Diagnoses and all orders for this visit:    Closed nondisplaced fracture of distal phalanx of left middle finger with routine healing, subsequent encounter  -     XR Finger LT G/E 2 vw; Future          See Patient Instructions  Patient Instructions   Updated x-rays today of the left long finger demonstrate good interval healing of the previously noted distal phalanx fracture.  Okay to discontinue the splint.  Okay to use the hand as comfortable doing so.  We discussed potential for referral to hand therapy, given some local sensitivity present around the end of the finger.  If interested in this, contact clinic.  Otherwise, follow-up is as needed.    If you have any further questions for your physician or physician s care team you can contact them thru [employer] or by calling [phone].      Tavares Mujica,   [facility]    SUBJECTIVE- Interim History September 29, 2023    Chief Complaint   Patient presents with     Left Hand - Follow Up       Toma White is a 48 year old female who is seen in f/u up for Data Unavailable. Since last visit on 6/30/23 patient has discontinued the use of the alumafoam splint, but notes that she has been able to utilize her hand without complaint. Complains that there is soreness with pressure along the DIP.      The patient is seen by themselves.  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  Additional history:  Sometimes aching, and tender at DIP joint of left long finger.  Otherwise feels like can do all  activities.      REVIEW OF SYSTEMS:  Review of Systems    OBJECTIVE:  Ht 1.524 m (5')   Wt 83.5 kg (184 lb)   BMI 35.94 kg/m       Left hand:  Grossly intact ROM long finger, no pain  Some tenderness distal long finger, near DIP joint    RADIOLOGY:  Final results and radiologist's interpretation, available in the [facility] health record.  Images were reviewed with the patient in the office today.  My personal interpretation of the performed imaging: healing fracture distal phalanx, unchanged in alignment.      Recent Results (from the past 24 hour(s))   XR Finger LT G/E 2 vw    Narrative    FINGER LEFT TWO OR MORE VIEWS   9/29/2023 9:27 AM     HISTORY:  Closed nondisplaced fracture of distal phalanx of left  middle finger, initial encounter.    COMPARISON: Radiographs from 6/29/2023.      Impression    IMPRESSION: The fracture at the base of the distal phalanx of the long  finger is unchanged in position and alignment. It is less visible and  there appears to now be bridging callus/bone indicating that there has  been some healing. No other change.    ANAYA HENDRICKS MD         SYSTEM ID:  [id number]               Again, thank you for allowing me to participate in the care of your patient.        Sincerely,        Tavares Mujica,

## 2023-10-10 ENCOUNTER — OFFICE VISIT (OUTPATIENT)
Dept: FAMILY MEDICINE | Facility: CLINIC | Age: 48
End: 2023-10-10
Payer: COMMERCIAL

## 2023-10-10 VITALS
BODY MASS INDEX: 34.31 KG/M2 | SYSTOLIC BLOOD PRESSURE: 100 MMHG | OXYGEN SATURATION: 97 % | WEIGHT: 181.7 LBS | RESPIRATION RATE: 12 BRPM | TEMPERATURE: 97.2 F | DIASTOLIC BLOOD PRESSURE: 60 MMHG | HEIGHT: 61 IN | HEART RATE: 55 BPM

## 2023-10-10 DIAGNOSIS — H92.01 OTALGIA, RIGHT: ICD-10-CM

## 2023-10-10 DIAGNOSIS — Z79.899 MEDICATION MANAGEMENT: ICD-10-CM

## 2023-10-10 DIAGNOSIS — L82.1 SEBORRHEIC KERATOSIS: ICD-10-CM

## 2023-10-10 DIAGNOSIS — Z00.00 ENCOUNTER FOR ROUTINE HISTORY AND PHYSICAL EXAM IN FEMALE: Primary | ICD-10-CM

## 2023-10-10 DIAGNOSIS — K08.89 PAIN, DENTAL: ICD-10-CM

## 2023-10-10 DIAGNOSIS — Z13.220 LIPID SCREENING: ICD-10-CM

## 2023-10-10 DIAGNOSIS — Z13.1 DIABETES MELLITUS SCREENING: ICD-10-CM

## 2023-10-10 LAB
ANION GAP SERPL CALCULATED.3IONS-SCNC: 10 MMOL/L (ref 7–15)
BUN SERPL-MCNC: 15.3 MG/DL (ref 6–20)
CALCIUM SERPL-MCNC: 9.4 MG/DL (ref 8.6–10)
CHLORIDE SERPL-SCNC: 104 MMOL/L (ref 98–107)
CHOLEST SERPL-MCNC: 114 MG/DL
CREAT SERPL-MCNC: 0.74 MG/DL (ref 0.51–0.95)
DEPRECATED HCO3 PLAS-SCNC: 26 MMOL/L (ref 22–29)
EGFRCR SERPLBLD CKD-EPI 2021: >90 ML/MIN/1.73M2
GLUCOSE SERPL-MCNC: 88 MG/DL (ref 70–99)
HBA1C MFR BLD: 5.6 % (ref 0–5.6)
HBV SURFACE AB SERPL IA-ACNC: 342.54 M[IU]/ML
HBV SURFACE AB SERPL IA-ACNC: REACTIVE M[IU]/ML
HDLC SERPL-MCNC: 45 MG/DL
HGB BLD-MCNC: 12.8 G/DL (ref 11.7–15.7)
LDLC SERPL CALC-MCNC: 58 MG/DL
NONHDLC SERPL-MCNC: 69 MG/DL
POTASSIUM SERPL-SCNC: 3.9 MMOL/L (ref 3.4–5.3)
SODIUM SERPL-SCNC: 140 MMOL/L (ref 135–145)
TRIGL SERPL-MCNC: 56 MG/DL

## 2023-10-10 PROCEDURE — 99213 OFFICE O/P EST LOW 20 MIN: CPT | Mod: 25 | Performed by: NURSE PRACTITIONER

## 2023-10-10 PROCEDURE — 99396 PREV VISIT EST AGE 40-64: CPT | Mod: 25 | Performed by: NURSE PRACTITIONER

## 2023-10-10 PROCEDURE — 83036 HEMOGLOBIN GLYCOSYLATED A1C: CPT | Performed by: NURSE PRACTITIONER

## 2023-10-10 PROCEDURE — 90471 IMMUNIZATION ADMIN: CPT | Performed by: NURSE PRACTITIONER

## 2023-10-10 PROCEDURE — 80061 LIPID PANEL: CPT | Performed by: NURSE PRACTITIONER

## 2023-10-10 PROCEDURE — 86706 HEP B SURFACE ANTIBODY: CPT | Performed by: NURSE PRACTITIONER

## 2023-10-10 PROCEDURE — 85018 HEMOGLOBIN: CPT | Performed by: NURSE PRACTITIONER

## 2023-10-10 PROCEDURE — 36415 COLL VENOUS BLD VENIPUNCTURE: CPT | Performed by: NURSE PRACTITIONER

## 2023-10-10 PROCEDURE — 80048 BASIC METABOLIC PNL TOTAL CA: CPT | Performed by: NURSE PRACTITIONER

## 2023-10-10 PROCEDURE — 90686 IIV4 VACC NO PRSV 0.5 ML IM: CPT | Performed by: NURSE PRACTITIONER

## 2023-10-10 RX ORDER — KETOCONAZOLE 20 MG/ML
SHAMPOO TOPICAL DAILY PRN
Status: CANCELLED | OUTPATIENT
Start: 2023-10-10

## 2023-10-10 RX ORDER — KETOCONAZOLE 20 MG/ML
SHAMPOO TOPICAL
Qty: 120 ML | Refills: 0 | Status: SHIPPED | OUTPATIENT
Start: 2023-10-10 | End: 2024-02-21

## 2023-10-10 ASSESSMENT — ENCOUNTER SYMPTOMS
ARTHRALGIAS: 0
DIZZINESS: 0
DYSURIA: 0
HEARTBURN: 0
PARESTHESIAS: 0
HEMATOCHEZIA: 0
BREAST MASS: 0
EYE PAIN: 0
DIARRHEA: 0
HEMATURIA: 0
WEAKNESS: 0
ABDOMINAL PAIN: 0
COUGH: 0
SORE THROAT: 0
SHORTNESS OF BREATH: 0
NERVOUS/ANXIOUS: 0
JOINT SWELLING: 0
MYALGIAS: 0
CHILLS: 0
FREQUENCY: 0
HEADACHES: 1
CONSTIPATION: 0
NAUSEA: 0
PALPITATIONS: 0
FEVER: 0

## 2023-10-10 ASSESSMENT — PAIN SCALES - GENERAL: PAINLEVEL: MODERATE PAIN (4)

## 2023-10-10 NOTE — PROGRESS NOTES
Assessment and Plan:    Encounter for routine history and physical exam in female  Recommend consuming a healthy diet and exercising.  Influenza vaccine provided.  Hepatitis B titer ordered.  She is up-to-date on breast cancer and colorectal cancer screening.  - Hemoglobin  - Hepatitis B Surface Antibody    Lipid screening  - Lipid panel reflex to direct LDL Fasting    Diabetes mellitus screening  - Hemoglobin A1c    Seborrheic keratosis  We will treat with ketoconazole shampoo.  If no improved symptoms, may consider referral to dermatology.  - ketoconazole (NIZORAL) 2 % external shampoo  Dispense: 120 mL; Refill: 0    Otalgia, right  Pain, dental  Referred to dental clinic.  - Dental Referral    Medication management  - Basic metabolic panel  (Ca, Cl, CO2, Creat, Gluc, K, Na, BUN)      Subjective:     Toma is a 48 year old female presenting to the clinic for a female physical.     LMP: 3 months ago   Hx of abnormal pap smear: 12 ASCUS could not exclude a high grade squamous lesion.  Colposcopy performed   Last pap smear: 10/31/22 normal, negative HPV   Perform self-breast exams: occasionally   Vaginal discharge or irritation: none   Sexually active: yes,  x 16 years  Contraception: tubal ligation  Concerns for STDs: none   Previous pregnancies:three pregnancies,    All boys ages 17, 12, 10    Patient is concerned of an intermittent rash that occurs on her scalp.  Rash has been present for 2 to 3 years.  It typically forms on the top of her head.  Area becomes sensitive to touch.  She keeps her hair short due to this.  She has not noticed any drainage from the area.    Patient has a history of chronic right ear pain.  She is seen numerous specialists.  Her dentist noted that her bite is misaligned.  Patient is seeking correction of this.    Review of systems:  I performed a 10 point review of systems.  All pertinent positives and negatives are noted in the HPI. All others are negative.      Allergies   Allergen Reactions    Animal Dander     Cats     Cockroach     Mold     Seasonal Allergies     Adhesive Tape-Silicones [Adhesive Tape] Rash     Was seen in the hospital and got rash where adhesive-tape was placed        No current outpatient medications on file.     No current facility-administered medications for this visit.       Social History     Socioeconomic History    Marital status:      Spouse name: Not on file    Number of children: Not on file    Years of education: Not on file    Highest education level: Not on file   Occupational History    Not on file   Tobacco Use    Smoking status: Never     Passive exposure: Never    Smokeless tobacco: Never   Vaping Use    Vaping Use: Never used   Substance and Sexual Activity    Alcohol use: No    Drug use: No    Sexual activity: Yes     Partners: Male   Other Topics Concern    Not on file   Social History Narrative    Not on file     Social Determinants of Health     Financial Resource Strain: Low Risk  (10/10/2023)    Financial Resource Strain     Within the past 12 months, have you or your family members you live with been unable to get utilities (heat, electricity) when it was really needed?: No   Food Insecurity: Low Risk  (10/10/2023)    Food Insecurity     Within the past 12 months, did you worry that your food would run out before you got money to buy more?: No     Within the past 12 months, did the food you bought just not last and you didn t have money to get more?: No   Transportation Needs: Low Risk  (10/10/2023)    Transportation Needs     Within the past 12 months, has lack of transportation kept you from medical appointments, getting your medicines, non-medical meetings or appointments, work, or from getting things that you need?: No   Physical Activity: Not on file   Stress: Not on file   Social Connections: Not on file   Interpersonal Safety: Low Risk  (10/10/2023)    Interpersonal Safety     Do you feel physically and  "emotionally safe where you currently live?: Yes     Within the past 12 months, have you been hit, slapped, kicked or otherwise physically hurt by someone?: No     Within the past 12 months, have you been humiliated or emotionally abused in other ways by your partner or ex-partner?: No   Housing Stability: Low Risk  (10/10/2023)    Housing Stability     Do you have housing? : Yes     Are you worried about losing your housing?: No       Past Medical History:   Diagnosis Date    Abnormal Pap smear of cervix     Anemia     Herpes        Family History   Problem Relation Age of Onset    No Known Problems Mother     No Known Problems Father        Past Surgical History:   Procedure Laterality Date     SECTION      [place] LAP,TUBAL CAUTERY Bilateral 2018    Procedure: LAPAROSCOPIC BILATERAL SALPINGECTOMY;  Surgeon: Kylah Rivas MD;  Location: [facility];  Service: Gynecology    [facility]  DELIVERY ONLY      Description:  Section;  Recorded: 2011;       Objective:     /60   Pulse 55   Temp 97.2  F (36.2  C)   Resp 12   Ht 1.54 m (5' 0.63\")   Wt 82.4 kg (181 lb 11.2 oz)   SpO2 97%   BMI 34.75 kg/m      Patient is alert, no obvious distress.   Skin: Warm, dry. Dry scaly skin noted on the scalp   HEENT:  Eyes normal.  Ears normal.  Nose patent, mucosa pink.  Oropharynx mucosa pink, no lesions or tonsil enlargement.   Neck:  Supple, without lymphadenopathy, bruits, JVD. Thyroid normal texture and size.    Lungs:  Clear to auscultation.  No wheezing, rales noted.  Respirations even and unlabored.   Heart:  Regular rate and rhythm.  No murmurs.   Breasts:  Normal.  No surrounding adenopathy.   Abdomen: Soft, nontender.  No organomegaly.  Bowel sounds normoactive.  No guarding or masses noted.   :  deferred  Musculoskeletal:  Full ROM of extremities.  Muscle strength equal +5/5.   Neurological:  Cranial nerves 2-12 intact.            Answers submitted by the patient for " this visit:  Annual Preventive Visit (Submitted on 10/10/2023)  Chief Complaint: Annual Exam:  Frequency of exercise:: 1 day/week  Getting at least 3 servings of Calcium per day:: Yes  Diet:: Other  Medication side effects:: None  Bi-annual eye exam:: Yes  Dental care twice a year:: Yes  Sleep apnea or symptoms of sleep apnea:: Excessive snoring  abdominal pain: No  Blood in stool: No  Blood in urine: No  chest pain: No  chills: No  congestion: No  constipation: No  cough: No  diarrhea: No  dizziness: No  ear pain: Yes  eye pain: No  nervous/anxious: No  fever: No  frequency: No  genital sores: No  headaches: Yes  hearing loss: No  heartburn: No  arthralgias: No  joint swelling: No  peripheral edema: No  mood changes: No  myalgias: No  nausea: No  dysuria: No  palpitations: No  Skin sensation changes: No  sore throat: No  urgency: No  rash: No  shortness of breath: No  visual disturbance: No  weakness: No  pelvic pain: No  vaginal bleeding: No  vaginal discharge: No  tenderness: No  breast mass: No  breast discharge: No  Additional concerns today:: Yes  Exercise outside of work (Submitted on 10/10/2023)  Chief Complaint: Annual Exam:  Duration of exercise:: 45-60 minutes

## 2023-10-15 ENCOUNTER — HOSPITAL ENCOUNTER (EMERGENCY)
Facility: HOSPITAL | Age: 48
Discharge: HOME | End: 2023-10-15
Attending: EMERGENCY MEDICINE | Admitting: EMERGENCY MEDICINE
Payer: COMMERCIAL

## 2023-10-15 VITALS
RESPIRATION RATE: 20 BRPM | HEART RATE: 51 BPM | HEIGHT: 62 IN | DIASTOLIC BLOOD PRESSURE: 65 MMHG | TEMPERATURE: 97.3 F | BODY MASS INDEX: 33.31 KG/M2 | WEIGHT: 181 LBS | SYSTOLIC BLOOD PRESSURE: 118 MMHG | OXYGEN SATURATION: 100 %

## 2023-10-15 DIAGNOSIS — R42 DIZZINESS: ICD-10-CM

## 2023-10-15 LAB
ANION GAP SERPL CALCULATED.3IONS-SCNC: 10 MMOL/L (ref 7–15)
BASO+EOS+MONOS # BLD AUTO: ABNORMAL 10*3/UL
BASO+EOS+MONOS NFR BLD AUTO: ABNORMAL %
BASOPHILS # BLD AUTO: 0 10E3/UL (ref 0–0.2)
BASOPHILS NFR BLD AUTO: 1 %
BUN SERPL-MCNC: 14.9 MG/DL (ref 6–20)
CALCIUM SERPL-MCNC: 9.9 MG/DL (ref 8.6–10)
CHLORIDE SERPL-SCNC: 106 MMOL/L (ref 98–107)
CREAT SERPL-MCNC: 0.72 MG/DL (ref 0.51–0.95)
DEPRECATED HCO3 PLAS-SCNC: 27 MMOL/L (ref 22–29)
EGFRCR SERPLBLD CKD-EPI 2021: >90 ML/MIN/1.73M2
EOSINOPHIL # BLD AUTO: 0.1 10E3/UL (ref 0–0.7)
EOSINOPHIL NFR BLD AUTO: 1 %
ERYTHROCYTE [DISTWIDTH] IN BLOOD BY AUTOMATED COUNT: 13.4 % (ref 10–15)
GLUCOSE SERPL-MCNC: 94 MG/DL (ref 70–99)
HCT VFR BLD AUTO: 39.7 % (ref 35–47)
HGB BLD-MCNC: 12.4 G/DL (ref 11.7–15.7)
IMM GRANULOCYTES # BLD: 0 10E3/UL
IMM GRANULOCYTES NFR BLD: 0 %
LYMPHOCYTES # BLD AUTO: 2 10E3/UL (ref 0.8–5.3)
LYMPHOCYTES NFR BLD AUTO: 36 %
MAGNESIUM SERPL-MCNC: 2 MG/DL (ref 1.7–2.3)
MCH RBC QN AUTO: 26.8 PG (ref 26.5–33)
MCHC RBC AUTO-ENTMCNC: 31.2 G/DL (ref 31.5–36.5)
MCV RBC AUTO: 86 FL (ref 78–100)
MONOCYTES # BLD AUTO: 0.4 10E3/UL (ref 0–1.3)
MONOCYTES NFR BLD AUTO: 8 %
NEUTROPHILS # BLD AUTO: 3.1 10E3/UL (ref 1.6–8.3)
NEUTROPHILS NFR BLD AUTO: 54 %
NRBC # BLD AUTO: 0 10E3/UL
NRBC BLD AUTO-RTO: 0 /100
PLATELET # BLD AUTO: 180 10E3/UL (ref 150–450)
POTASSIUM SERPL-SCNC: 3.9 MMOL/L (ref 3.4–5.3)
RBC # BLD AUTO: 4.63 10E6/UL (ref 3.8–5.2)
SODIUM SERPL-SCNC: 143 MMOL/L (ref 135–145)
TROPONIN T SERPL HS-MCNC: <6 NG/L
WBC # BLD AUTO: 5.7 10E3/UL (ref 4–11)

## 2023-10-15 PROCEDURE — 80048 BASIC METABOLIC PNL TOTAL CA: CPT | Performed by: EMERGENCY MEDICINE

## 2023-10-15 PROCEDURE — 85025 COMPLETE CBC W/AUTO DIFF WBC: CPT | Performed by: EMERGENCY MEDICINE

## 2023-10-15 PROCEDURE — 258N000003 HC RX IP 258 OP 636: Performed by: EMERGENCY MEDICINE

## 2023-10-15 PROCEDURE — 96360 HYDRATION IV INFUSION INIT: CPT

## 2023-10-15 PROCEDURE — 84484 ASSAY OF TROPONIN QUANT: CPT | Performed by: EMERGENCY MEDICINE

## 2023-10-15 PROCEDURE — 36415 COLL VENOUS BLD VENIPUNCTURE: CPT | Performed by: EMERGENCY MEDICINE

## 2023-10-15 PROCEDURE — 93005 ELECTROCARDIOGRAM TRACING: CPT | Mod: 76 | Performed by: EMERGENCY MEDICINE

## 2023-10-15 PROCEDURE — 99284 EMERGENCY DEPT VISIT MOD MDM: CPT | Mod: 25

## 2023-10-15 PROCEDURE — 83735 ASSAY OF MAGNESIUM: CPT | Performed by: EMERGENCY MEDICINE

## 2023-10-15 PROCEDURE — 250N000013 HC RX MED GY IP 250 OP 250 PS 637: Performed by: EMERGENCY MEDICINE

## 2023-10-15 RX ORDER — MECLIZINE HCL 12.5 MG 12.5 MG/1
25 TABLET ORAL ONCE
Status: COMPLETED | OUTPATIENT
Start: 2023-10-15 | End: 2023-10-15

## 2023-10-15 RX ADMIN — MECLIZINE 25 MG: 12.5 TABLET ORAL at 18:07

## 2023-10-15 RX ADMIN — SODIUM CHLORIDE 500 ML: 9 INJECTION, SOLUTION INTRAVENOUS at 18:02

## 2023-10-15 ASSESSMENT — ACTIVITIES OF DAILY LIVING (ADL): ADLS_ACUITY_SCORE: 35

## 2023-10-15 NOTE — DISCHARGE INSTRUCTIONS
You were seen in the Emergency Department today for evaluation of dizziness.  Your lab work showed no cause of your symptoms.  Follow up with your primary care physician to ensure resolution of symptoms. Return if you have new or worsening symptoms.

## 2023-10-15 NOTE — ED TRIAGE NOTES
Pt was in usual state of health [place] when she suddenly felt weak and like she had to sit down. She  states she felt some dizziness but it wasn't room spinning. Paramedics were called and her vitals and EKG was ok. Pt came here for further evaluation.  She is feeling better but  still not normal. Pt ate today.

## 2023-10-15 NOTE — ED PROVIDER NOTES
EMERGENCY DEPARTMENT ENCOUNTER      NAME: Toma White  AGE: 48 year old female  YOB: 1975  MRN: [id number]  EVALUATION DATE & TIME: 10/15/2023  5:13 PM    PCP: EVERTON Reyes [facility]    ED PROVIDER: Anila Lam M.D.      Chief Complaint   Patient presents with    Generalized Weakness     FINAL IMPRESSION:  1. Dizziness        ED COURSE & MEDICAL DECISION MAKING:    Pertinent Labs & Imaging studies reviewed. (See chart for details)  ED Course as of 10/15/23 2117   Sun Oct 15, 2023   1744 Patient initial presentation and what she is describing sounds like a peripheral vertigo.  She has no nystagmus.  She has no nausea or vomiting.  She has no headaches.  Her physical exam is unremarkable.  Daphnie treat her symptomatically with some meclizine and some IV fluids.  We will get some basic labs on her and start with that.  We will make sure she is not renal failure has an arrhythmia or ACS or electrolyte abnormality.  I suspect that she will feel better symptomatically and be able to discharge home later today.  I discussed the plan with the patient she is in agreement.   1844 Troponin is less than 6.  Electrolytes look great.  Renal function looks great.  Hemoglobin looks great.  I will check back in on the patient and see how she is feeling but hopefully we can get her home soon.   1856 Discussed results and plan for discharge with the patient.  She is in agreement.  We will work on getting her dismissed shortly.       5:23 PM Introduced myself to the patient, obtained history of present illness, and performed initial physical exam at this time.   6:56 PM Discussed discharge with the patient, she is agreeable with this plan.    Medical Decision Making    History:  Supplemental history from:   External Record(s) reviewed: Reviewed the patient's office visit from 10/10/2023 with Yandy Shell CNP.  Patient was there just for routine history and physical her hemoglobin A1c came back  normal.  Her BMP was unremarkable.    Work Up:  Emergent/Severe conditions considered and evaluated for: Peripheral vertigo, electrolyte abnormality, anemia, arrhythmia, ACS  I independently reviewed and interpreted EKG  In additional to work up documented, I considered the following work up: None  Medications given that require intensive monitoring for toxicity: None    External consultation:  Discussion of management with another provider: None    Complicating factors:  Care impacted by chronic illness: None  Care affected by social determinants of health: None    Disposition considerations: Discharge  Prescriptions considered/prescribed: None    At the conclusion of the encounter I discussed  the results of all of the tests and the disposition with patient.   All questions were answered.  The patient acknowledged understanding and was involved in the decision making regarding the overall care plan.      I discussed with patient the utility, limitations and findings of the exam/interventions/studies done during this visit as well as the list of differential diagnosis and symptoms to monitor/return to ER for.  Additional verbal discharge instructions were provided.     MEDICATIONS GIVEN IN THE EMERGENCY:  Medications   sodium chloride 0.9% BOLUS 500 mL (0 mLs Intravenous Stopped 10/15/23 1929)   meclizine (ANTIVERT) tablet 25 mg (25 mg Oral $Given 10/15/23 1807)       NEW PRESCRIPTIONS STARTED AT TODAY'S ER VISIT  Discharge Medication List as of 10/15/2023  7:34 PM             =================================================================    HPI    Triage Note: Pt was in usual state of health and [place] when she suddenly felt weak and like she had to sit down. She  states she felt some dizziness but it wasn't room spinning. Paramedics were called and her vitals and EKG was ok. Pt came here for further evaluation.  She is feeling better but  still not normal. Pt ate today.        Patient  information was obtained from: the patient     Use of : N/A       Toma White is a 48 year old female with a medical history of H pylori infection, vitamin D deficiency, and CHAGO, who presents for evaluation of generalized weakness.    Patient reports that she developed weakness and dizziness at 3:30 PM.  She was sitting down and it just started all of a sudden.  It is worse with moving her head.  She denies any blurry vision or double vision.  She denies any headache.  She reports chills but no fevers.  She had not eaten much today.  She complains of some general tingling in her legs.  She is never anything like this before and denies any chronic medical conditions    PAST MEDICAL HISTORY:  Past Medical History:   Diagnosis Date    Abnormal Pap smear of cervix     Anemia     Herpes        PAST SURGICAL HISTORY:  Past Surgical History:   Procedure Laterality Date     SECTION      [place] LAP,TUBAL CAUTERY Bilateral 2018    Procedure: LAPAROSCOPIC BILATERAL SALPINGECTOMY;  Surgeon: Kylah Rivas MD;  Location: [facility];  Service: Gynecology    [facility]  DELIVERY ONLY      Description:  Section;  Recorded: 2011;       CURRENT MEDICATIONS:    No current facility-administered medications for this encounter.    Current Outpatient Medications:     ketoconazole (NIZORAL) 2 % external shampoo, Apply to the scalp twice weekly for 8 weeks and then as needed., Disp: 120 mL, Rfl: 0    ALLERGIES:  Allergies   Allergen Reactions    Animal Dander     Cats     Cockroach     Mold     Seasonal Allergies     Adhesive Tape-Silicones [Adhesive Tape] Rash     Was seen in the hospital and got rash where adhesive-tape was placed        FAMILY HISTORY:  Family History   Problem Relation Age of Onset    No Known Problems Mother     No Known Problems Father        SOCIAL HISTORY:   Social History     Socioeconomic History    Marital status:    Tobacco Use    Smoking status:  "Never     Passive exposure: Never    Smokeless tobacco: Never   Vaping Use    Vaping Use: Never used   Substance and Sexual Activity    Alcohol use: No    Drug use: No    Sexual activity: Yes     Partners: Male     Social Determinants of Health     Financial Resource Strain: Low Risk  (10/10/2023)    Financial Resource Strain     Within the past 12 months, have you or your family members you live with been unable to get utilities (heat, electricity) when it was really needed?: No   Food Insecurity: Low Risk  (10/10/2023)    Food Insecurity     Within the past 12 months, did you worry that your food would run out before you got money to buy more?: No     Within the past 12 months, did the food you bought just not last and you didn t have money to get more?: No   Transportation Needs: Low Risk  (10/10/2023)    Transportation Needs     Within the past 12 months, has lack of transportation kept you from medical appointments, getting your medicines, non-medical meetings or appointments, work, or from getting things that you need?: No   Interpersonal Safety: Low Risk  (10/10/2023)    Interpersonal Safety     Do you feel physically and emotionally safe where you currently live?: Yes     Within the past 12 months, have you been hit, slapped, kicked or otherwise physically hurt by someone?: No     Within the past 12 months, have you been humiliated or emotionally abused in other ways by your partner or ex-partner?: No   Housing Stability: Low Risk  (10/10/2023)    Housing Stability     Do you have housing? : Yes     Are you worried about losing your housing?: No       PHYSICAL EXAM    VITAL SIGNS: /65   Pulse 51   Temp 97.3  F (36.3  C) (Tympanic)   Resp 20   Ht 1.575 m (5' 2\")   Wt 82.1 kg (181 lb)   SpO2 100%   BMI 33.11 kg/m     GENERAL: Awake, alert, answering questions appropriately, no acute distress, no nystagmus, awake and alert without mental status changes  SPEECH:  Easy to understand speech, Normal " volume and vane  PULMONARY: No respiratory distress, Lungs clear to auscultation bilaterally  CARDIOVASCULAR: Regular rate and rhythm, Distal pulses present and normal.  ABDOMINAL: Soft, Nondistended, Nontender, No rebound or guarding, No palpable masses  EXTREMITIES: No lower extremity edema.  PSYCH: Normal mood and affect     LAB:  All pertinent labs reviewed and interpreted.  Results for orders placed or performed during the hospital encounter of 10/15/23   Basic metabolic panel   Result Value Ref Range    Sodium 143 135 - 145 mmol/L    Potassium 3.9 3.4 - 5.3 mmol/L    Chloride 106 98 - 107 mmol/L    Carbon Dioxide (CO2) 27 22 - 29 mmol/L    Anion Gap 10 7 - 15 mmol/L    Urea Nitrogen 14.9 6.0 - 20.0 mg/dL    Creatinine 0.72 0.51 - 0.95 mg/dL    GFR Estimate >90 >60 mL/min/1.73m2    Calcium 9.9 8.6 - 10.0 mg/dL    Glucose 94 70 - 99 mg/dL   Result Value Ref Range    Troponin T, High Sensitivity <6 <=14 ng/L   Result Value Ref Range    Magnesium 2.0 1.7 - 2.3 mg/dL   CBC with platelets and differential   Result Value Ref Range    WBC Count 5.7 4.0 - 11.0 10e3/uL    RBC Count 4.63 3.80 - 5.20 10e6/uL    Hemoglobin 12.4 11.7 - 15.7 g/dL    Hematocrit 39.7 35.0 - 47.0 %    MCV 86 78 - 100 fL    MCH 26.8 26.5 - 33.0 pg    MCHC 31.2 (L) 31.5 - 36.5 g/dL    RDW 13.4 10.0 - 15.0 %    Platelet Count 180 150 - 450 10e3/uL    % Neutrophils 54 %    % Lymphocytes 36 %    % Monocytes 8 %    Mids % (Monos, Eos, Basos)      % Eosinophils 1 %    % Basophils 1 %    % Immature Granulocytes 0 %    NRBCs per 100 WBC 0 <1 /100    Absolute Neutrophils 3.1 1.6 - 8.3 10e3/uL    Absolute Lymphocytes 2.0 0.8 - 5.3 10e3/uL    Absolute Monocytes 0.4 0.0 - 1.3 10e3/uL    Mids Abs (Monos, Eos, Basos)      Absolute Eosinophils 0.1 0.0 - 0.7 10e3/uL    Absolute Basophils 0.0 0.0 - 0.2 10e3/uL    Absolute Immature Granulocytes 0.0 <=0.4 10e3/uL    Absolute NRBCs 0.0 10e3/uL       EKG:    Date and time: October 15, 2023 at 1755  Rate: 53  bpm  Rhythm: Sinus bradycardia  MS interval: 152 ms  QRS interval: 74 ms  QT/QTc: 442/414 ms  ST changes or T wave changes: No acute ST or T wave abnormalities  Change from prior ECG: No significant change from prior  I have independently reviewed and interpreted this EKG.     I, Gracie Dany, am serving as a scribe to document services personally performed by Dr. Lam based on my observation and the provider's statements to me. I, Anila Lam MD attest that Gracie Saenz is acting in a scribe capacity, has observed my performance of the services and has documented them in accordance with my direction.    Anila Lam M.D.  Emergency Medicine  [facility] EMERGENCY DEPARTMENT  [facility] [facility] [id number] [phone]  Dept: [phone]       Anila Lam MD  10/15/23 2795

## 2023-10-16 LAB
ATRIAL RATE - MUSE: 53 BPM
DIASTOLIC BLOOD PRESSURE - MUSE: NORMAL MMHG
INTERPRETATION ECG - MUSE: NORMAL
P AXIS - MUSE: 65 DEGREES
PR INTERVAL - MUSE: 152 MS
QRS DURATION - MUSE: 74 MS
QT - MUSE: 442 MS
QTC - MUSE: 414 MS
R AXIS - MUSE: 26 DEGREES
SYSTOLIC BLOOD PRESSURE - MUSE: NORMAL MMHG
T AXIS - MUSE: 43 DEGREES
VENTRICULAR RATE- MUSE: 53 BPM

## 2023-10-19 ENCOUNTER — OFFICE VISIT (OUTPATIENT)
Dept: FAMILY MEDICINE | Facility: CLINIC | Age: 48
End: 2023-10-19
Payer: COMMERCIAL

## 2023-10-19 VITALS
WEIGHT: 187.6 LBS | OXYGEN SATURATION: 100 % | RESPIRATION RATE: 19 BRPM | DIASTOLIC BLOOD PRESSURE: 54 MMHG | HEIGHT: 62 IN | BODY MASS INDEX: 34.52 KG/M2 | HEART RATE: 54 BPM | TEMPERATURE: 97.3 F | SYSTOLIC BLOOD PRESSURE: 125 MMHG

## 2023-10-19 DIAGNOSIS — M25.512 LEFT SHOULDER PAIN, UNSPECIFIED CHRONICITY: ICD-10-CM

## 2023-10-19 DIAGNOSIS — R42 VERTIGO: Primary | ICD-10-CM

## 2023-10-19 DIAGNOSIS — M26.609 TMJ (TEMPOROMANDIBULAR JOINT SYNDROME): ICD-10-CM

## 2023-10-19 DIAGNOSIS — Z12.31 VISIT FOR SCREENING MAMMOGRAM: ICD-10-CM

## 2023-10-19 PROCEDURE — 99213 OFFICE O/P EST LOW 20 MIN: CPT | Performed by: NURSE PRACTITIONER

## 2023-10-19 ASSESSMENT — PAIN SCALES - GENERAL: PAINLEVEL: EXTREME PAIN (8)

## 2023-10-19 NOTE — ASSESSMENT & PLAN NOTE
Has had PT in approximatelyJune 2022.  Pain is improved but not gone.  Has gotten splint for night wear early 2022

## 2023-10-19 NOTE — ASSESSMENT & PLAN NOTE
Episode of vertigo in 2021--resolved with flushing excess cerumen left ear.  Episode of vertigo 2023--ED visit reviewed.  IV fluids and lab workup.  Symptoms are feeling like her balance is off and she may fall.  No symptoms when she is sitting still. No hearing change.  No fever.  Does have clicking sound in right ear when swallowing and yawning

## 2023-10-19 NOTE — PROGRESS NOTES
Assessment & Plan     ICD-10-CM    1. Vertigo  R42       2. Visit for screening mammogram  Z12.31       3. TMJ (temporomandibular joint syndrome)  M26.609       4. Left shoulder pain, unspecified chronicity  M25.512 Physical Therapy Referral            Vertigo--likely viral labyrinthitis usual course reviewed with pt.  She declines medication at this time  ALSO at end of visit pt complains of left shoulder pain without injury, redness or swelling. Worse at night when lying on side.  Demonstrates painful arc with abduction.  Would like to go to PT      There are no Patient Instructions on file for this visit.  Return in about 4 weeks (around 11/16/2023) for In Clinic Follow Up.      YURIDIA Reyes CNP  [facility] [place]  ===================================  Subjective   Toma is a 48 year old, presenting for the following health issues:  Hospital F/U        10/19/2023     8:23 AM   Additional Questions   Roomed by Korin AGUIRRE   Accompanied by Self         10/19/2023     8:23 AM   Patient Reported Additional Medications   Patient reports taking the following new medications None       HPI       Hospital Follow-up Visit:    Hospital/Nursing Home/IP Rehab Facility: [facility]  Date of Admission: 10/15/2023  Date of Discharge: 10/15/2023  Reason(s) for Admission: Dizziness    Was your hospitalization related to COVID-19? No   Problems taking medications regularly:  None  Medication changes since discharge: None  Problems adhering to non-medication therapy:  None    Summary of hospitalization:  [facility] discharge summary reviewed  Diagnostic Tests/Treatments reviewed.  Follow up needed: none  Other Healthcare Providers Involved in Patient s Care:         None  Update since discharge: improved.       Plan of care communicated with patient     TMJ (temporomandibular joint syndrome)  Has had PT in approximatelyJune 2022.  Pain is improved but not gone.  Has  "gotten splint for night wear early 2022    Vertigo  Episode of vertigo in 2021--resolved with flushing excess cerumen left ear.  Episode of vertigo 2023--ED visit reviewed.  IV fluids and lab workup.  Symptoms are feeling like her balance is off and she may fall.  No symptoms when she is sitting still. No hearing change.  No fever.  Does have clicking sound in right ear when swallowing and yawning        Review of Systems   Constitutional:  Negative for chills, fever and unexpected weight change.   HENT:  Negative for ear discharge, ear pain, tinnitus and trouble swallowing.         Right ear snapping sound with yawning and swallowing   Respiratory: Negative.  Negative for cough and shortness of breath.    Cardiovascular: Negative.    Gastrointestinal: Negative.    Genitourinary: Negative.    Musculoskeletal:         TMJ pain   Neurological:  Positive for dizziness.            Objective    /54 (BP Location: Right arm, Patient Position: Sitting, Cuff Size: Adult Large)   Pulse 54   Temp 97.3  F (36.3  C) (Oral)   Resp 19   Ht 1.575 m (5' 2.01\")   Wt 85.1 kg (187 lb 9.6 oz)   SpO2 100%   BMI 34.30 kg/m    Body mass index is 34.3 kg/m .  Physical Exam  Constitutional:       General: She is not in acute distress.     Appearance: Normal appearance. She is well-developed.   HENT:      Head: Normocephalic.      Right Ear: Tympanic membrane normal.      Left Ear: Tympanic membrane normal.      Mouth/Throat:      Mouth: Mucous membranes are moist.   Eyes:      Conjunctiva/sclera: Conjunctivae normal.   Cardiovascular:      Rate and Rhythm: Normal rate and regular rhythm.      Heart sounds: Normal heart sounds.   Pulmonary:      Effort: Pulmonary effort is normal.      Breath sounds: Normal breath sounds.   Abdominal:      General: Bowel sounds are normal.      Palpations: Abdomen is soft. There is no mass.      Tenderness: There is no abdominal tenderness.   Musculoskeletal:      Cervical back: Neck supple. "   Lymphadenopathy:      Cervical: No cervical adenopathy.   Skin:     General: Skin is warm and dry.      Findings: No rash.   Neurological:      General: No focal deficit present.      Mental Status: She is alert and oriented to person, place, and time.      Cranial Nerves: No cranial nerve deficit.      Motor: No weakness.      Coordination: Coordination normal.      Gait: Gait normal.      Deep Tendon Reflexes: Reflexes normal.   Psychiatric:         Mood and Affect: Mood normal.         Thought Content: Thought content normal.         Judgment: Judgment normal.

## 2023-10-20 ASSESSMENT — ENCOUNTER SYMPTOMS
UNEXPECTED WEIGHT CHANGE: 0
COUGH: 0
CHILLS: 0
CARDIOVASCULAR NEGATIVE: 1
RESPIRATORY NEGATIVE: 1
TROUBLE SWALLOWING: 0
GASTROINTESTINAL NEGATIVE: 1
FEVER: 0
DIZZINESS: 1
SHORTNESS OF BREATH: 0

## 2023-10-22 ENCOUNTER — APPOINTMENT (OUTPATIENT)
Dept: RADIOLOGY | Facility: HOSPITAL | Age: 48
End: 2023-10-22
Attending: EMERGENCY MEDICINE
Payer: COMMERCIAL

## 2023-10-22 ENCOUNTER — HOSPITAL ENCOUNTER (EMERGENCY)
Facility: HOSPITAL | Age: 48
Discharge: HOME | End: 2023-10-22
Attending: EMERGENCY MEDICINE | Admitting: EMERGENCY MEDICINE
Payer: COMMERCIAL

## 2023-10-22 VITALS
HEART RATE: 66 BPM | SYSTOLIC BLOOD PRESSURE: 128 MMHG | RESPIRATION RATE: 23 BRPM | DIASTOLIC BLOOD PRESSURE: 65 MMHG | TEMPERATURE: 98.3 F | OXYGEN SATURATION: 97 %

## 2023-10-22 DIAGNOSIS — T59.811A SMOKE INHALATION: ICD-10-CM

## 2023-10-22 LAB
ANION GAP SERPL CALCULATED.3IONS-SCNC: 10 MMOL/L (ref 7–15)
BASO+EOS+MONOS # BLD AUTO: NORMAL 10*3/UL
BASO+EOS+MONOS NFR BLD AUTO: NORMAL %
BASOPHILS # BLD AUTO: 0 10E3/UL (ref 0–0.2)
BASOPHILS NFR BLD AUTO: 0 %
BUN SERPL-MCNC: 14.7 MG/DL (ref 6–20)
CALCIUM SERPL-MCNC: 9.5 MG/DL (ref 8.6–10)
CHLORIDE SERPL-SCNC: 102 MMOL/L (ref 98–107)
CREAT SERPL-MCNC: 0.74 MG/DL (ref 0.51–0.95)
D DIMER PPP FEU-MCNC: 0.4 UG/ML FEU (ref 0–0.5)
DEPRECATED HCO3 PLAS-SCNC: 26 MMOL/L (ref 22–29)
EGFRCR SERPLBLD CKD-EPI 2021: >90 ML/MIN/1.73M2
EOSINOPHIL # BLD AUTO: 0.1 10E3/UL (ref 0–0.7)
EOSINOPHIL NFR BLD AUTO: 2 %
ERYTHROCYTE [DISTWIDTH] IN BLOOD BY AUTOMATED COUNT: 13.2 % (ref 10–15)
GLUCOSE SERPL-MCNC: 103 MG/DL (ref 70–99)
HCT VFR BLD AUTO: 40.8 % (ref 35–47)
HGB BLD-MCNC: 13.1 G/DL (ref 11.7–15.7)
IMM GRANULOCYTES # BLD: 0 10E3/UL
IMM GRANULOCYTES NFR BLD: 0 %
LYMPHOCYTES # BLD AUTO: 1.6 10E3/UL (ref 0.8–5.3)
LYMPHOCYTES NFR BLD AUTO: 29 %
MCH RBC QN AUTO: 27.1 PG (ref 26.5–33)
MCHC RBC AUTO-ENTMCNC: 32.1 G/DL (ref 31.5–36.5)
MCV RBC AUTO: 84 FL (ref 78–100)
MONOCYTES # BLD AUTO: 0.4 10E3/UL (ref 0–1.3)
MONOCYTES NFR BLD AUTO: 8 %
NEUTROPHILS # BLD AUTO: 3.4 10E3/UL (ref 1.6–8.3)
NEUTROPHILS NFR BLD AUTO: 61 %
NRBC # BLD AUTO: 0 10E3/UL
NRBC BLD AUTO-RTO: 0 /100
NT-PROBNP SERPL-MCNC: 39 PG/ML (ref 0–450)
PLATELET # BLD AUTO: 186 10E3/UL (ref 150–450)
POTASSIUM SERPL-SCNC: 3.7 MMOL/L (ref 3.4–5.3)
RBC # BLD AUTO: 4.84 10E6/UL (ref 3.8–5.2)
SODIUM SERPL-SCNC: 138 MMOL/L (ref 135–145)
TROPONIN T SERPL HS-MCNC: 7 NG/L
WBC # BLD AUTO: 5.6 10E3/UL (ref 4–11)

## 2023-10-22 PROCEDURE — 96374 THER/PROPH/DIAG INJ IV PUSH: CPT

## 2023-10-22 PROCEDURE — 85025 COMPLETE CBC W/AUTO DIFF WBC: CPT | Performed by: EMERGENCY MEDICINE

## 2023-10-22 PROCEDURE — 84484 ASSAY OF TROPONIN QUANT: CPT | Performed by: EMERGENCY MEDICINE

## 2023-10-22 PROCEDURE — 83880 ASSAY OF NATRIURETIC PEPTIDE: CPT | Performed by: EMERGENCY MEDICINE

## 2023-10-22 PROCEDURE — 36415 COLL VENOUS BLD VENIPUNCTURE: CPT | Performed by: EMERGENCY MEDICINE

## 2023-10-22 PROCEDURE — 250N000009 HC RX 250: Performed by: EMERGENCY MEDICINE

## 2023-10-22 PROCEDURE — 85379 FIBRIN DEGRADATION QUANT: CPT | Performed by: EMERGENCY MEDICINE

## 2023-10-22 PROCEDURE — 93005 ELECTROCARDIOGRAM TRACING: CPT | Performed by: STUDENT IN AN ORGANIZED HEALTH CARE EDUCATION/TRAINING PROGRAM

## 2023-10-22 PROCEDURE — 80048 BASIC METABOLIC PNL TOTAL CA: CPT | Performed by: EMERGENCY MEDICINE

## 2023-10-22 PROCEDURE — 99285 EMERGENCY DEPT VISIT HI MDM: CPT | Mod: 25

## 2023-10-22 PROCEDURE — 250N000011 HC RX IP 250 OP 636: Mod: JZ | Performed by: EMERGENCY MEDICINE

## 2023-10-22 PROCEDURE — 94640 AIRWAY INHALATION TREATMENT: CPT

## 2023-10-22 PROCEDURE — 71046 X-RAY EXAM CHEST 2 VIEWS: CPT

## 2023-10-22 RX ORDER — ALBUTEROL SULFATE 5 MG/ML
2.5 SOLUTION RESPIRATORY (INHALATION) EVERY 6 HOURS PRN
Status: DISCONTINUED | OUTPATIENT
Start: 2023-10-22 | End: 2023-10-22 | Stop reason: HOSPADM

## 2023-10-22 RX ORDER — ALBUTEROL SULFATE 90 UG/1
2 AEROSOL, METERED RESPIRATORY (INHALATION) EVERY 6 HOURS PRN
Qty: 18 G | Refills: 0 | Status: SHIPPED | OUTPATIENT
Start: 2023-10-22 | End: 2024-02-21

## 2023-10-22 RX ORDER — IPRATROPIUM BROMIDE AND ALBUTEROL SULFATE 2.5; .5 MG/3ML; MG/3ML
3 SOLUTION RESPIRATORY (INHALATION) ONCE
Status: COMPLETED | OUTPATIENT
Start: 2023-10-22 | End: 2023-10-22

## 2023-10-22 RX ORDER — PREDNISONE 20 MG/1
TABLET ORAL
Qty: 10 TABLET | Refills: 0 | Status: SHIPPED | OUTPATIENT
Start: 2023-10-22 | End: 2024-01-12

## 2023-10-22 RX ORDER — METHYLPREDNISOLONE SODIUM SUCCINATE 125 MG/2ML
125 INJECTION, POWDER, LYOPHILIZED, FOR SOLUTION INTRAMUSCULAR; INTRAVENOUS ONCE
Status: COMPLETED | OUTPATIENT
Start: 2023-10-22 | End: 2023-10-22

## 2023-10-22 RX ADMIN — METHYLPREDNISOLONE SODIUM SUCCINATE 125 MG: 125 INJECTION, POWDER, FOR SOLUTION INTRAMUSCULAR; INTRAVENOUS at 18:52

## 2023-10-22 RX ADMIN — IPRATROPIUM BROMIDE AND ALBUTEROL SULFATE 3 ML: .5; 3 SOLUTION RESPIRATORY (INHALATION) at 18:53

## 2023-10-22 ASSESSMENT — ENCOUNTER SYMPTOMS: COUGH: 1

## 2023-10-22 ASSESSMENT — ACTIVITIES OF DAILY LIVING (ADL): ADLS_ACUITY_SCORE: 35
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
## 2023-10-23 NOTE — DISCHARGE INSTRUCTIONS
Encourage rest and fluids.  Proventil inhaler 2 puffs every 2-4 hours if needed for shortness of breath or wheezing.  Prednisone daily as prescribed.  See your clinic for follow-up this week.  They may wish to repeat an x-ray.  See them sooner or return if worse or problems.

## 2023-10-28 LAB
ATRIAL RATE - MUSE: 74 BPM
DIASTOLIC BLOOD PRESSURE - MUSE: NORMAL MMHG
INTERPRETATION ECG - MUSE: NORMAL
P AXIS - MUSE: 47 DEGREES
PR INTERVAL - MUSE: 150 MS
QRS DURATION - MUSE: 74 MS
QT - MUSE: 372 MS
QTC - MUSE: 412 MS
R AXIS - MUSE: -21 DEGREES
SYSTOLIC BLOOD PRESSURE - MUSE: NORMAL MMHG
T AXIS - MUSE: 35 DEGREES
VENTRICULAR RATE- MUSE: 74 BPM

## 2024-01-12 ENCOUNTER — OFFICE VISIT (OUTPATIENT)
Dept: FAMILY MEDICINE | Facility: CLINIC | Age: 49
End: 2024-01-12
Payer: COMMERCIAL

## 2024-01-12 VITALS
SYSTOLIC BLOOD PRESSURE: 136 MMHG | TEMPERATURE: 98.4 F | BODY MASS INDEX: 34.04 KG/M2 | HEIGHT: 62 IN | HEART RATE: 58 BPM | DIASTOLIC BLOOD PRESSURE: 77 MMHG | OXYGEN SATURATION: 98 % | WEIGHT: 185 LBS | RESPIRATION RATE: 16 BRPM

## 2024-01-12 DIAGNOSIS — J02.9 ACUTE PHARYNGITIS, UNSPECIFIED ETIOLOGY: ICD-10-CM

## 2024-01-12 DIAGNOSIS — R07.0 THROAT PAIN: ICD-10-CM

## 2024-01-12 DIAGNOSIS — J06.9 VIRAL UPPER RESPIRATORY TRACT INFECTION: Primary | ICD-10-CM

## 2024-01-12 LAB
DEPRECATED S PYO AG THROAT QL EIA: NEGATIVE
FLUAV AG SPEC QL IA: NEGATIVE
FLUBV AG SPEC QL IA: NEGATIVE
GROUP A STREP BY PCR: NOT DETECTED

## 2024-01-12 PROCEDURE — 87804 INFLUENZA ASSAY W/OPTIC: CPT | Performed by: NURSE PRACTITIONER

## 2024-01-12 PROCEDURE — 99213 OFFICE O/P EST LOW 20 MIN: CPT | Performed by: NURSE PRACTITIONER

## 2024-01-12 PROCEDURE — 87651 STREP A DNA AMP PROBE: CPT | Performed by: NURSE PRACTITIONER

## 2024-01-12 RX ORDER — BENZONATATE 200 MG/1
200 CAPSULE ORAL 3 TIMES DAILY PRN
Qty: 30 CAPSULE | Refills: 0 | Status: SHIPPED | OUTPATIENT
Start: 2024-01-12 | End: 2024-02-21

## 2024-01-12 RX ORDER — DEXAMETHASONE SODIUM PHOSPHATE 10 MG/ML
10 INJECTION INTRAMUSCULAR; INTRAVENOUS ONCE
Status: COMPLETED | OUTPATIENT
Start: 2024-01-12 | End: 2024-01-12

## 2024-01-12 RX ADMIN — DEXAMETHASONE SODIUM PHOSPHATE 10 MG: 10 INJECTION INTRAMUSCULAR; INTRAVENOUS at 09:31

## 2024-01-12 ASSESSMENT — ENCOUNTER SYMPTOMS
SORE THROAT: 1
COUGH: 1

## 2024-01-12 ASSESSMENT — PAIN SCALES - GENERAL: PAINLEVEL: SEVERE PAIN (6)

## 2024-01-12 NOTE — PROGRESS NOTES
"  Assessment & Plan     Viral upper respiratory tract infection  Symptoms have been since Monday, vital signs are normal, lungs are clear.  Likely this is viral in nature and will be self-limiting.  She has been negative for COVID, influenza, and strep.  If strep is positive tomorrow I will treat at that time.  - benzonatate (TESSALON) 200 MG capsule; Take 1 capsule (200 mg) by mouth 3 times daily as needed for cough    Acute pharyngitis, unspecified etiology  Will give her 1 dose of dexamethasone to help with the swelling in her throat so she has some improvement over today and tomorrow.  - dexAMETHasone (DECADRON) injection 10 mg    Throat pain  - Streptococcus A Rapid Screen w/Reflex to PCR - Clinic Collect  - Influenza A & B Antigen - Clinic Collect  - Group A Streptococcus PCR Throat Swab      BMI:   Estimated body mass index is 33.84 kg/m  as calculated from the following:    Height as of this encounter: 1.575 m (5' 2\").    Weight as of this encounter: 83.9 kg (185 lb).       FUTURE APPOINTMENTS:       - Follow-up for annual visit or as needed    YURIDIA Elizabeth CNP  [facility] SABINA Chua is a 48 year old, presenting for the following health issues:  Throat Pain and Generalized Body Aches      HPI     Cold symptoms started Monday -  Cough, sore throat, fevers, body aches.  Using theraflu and tylenol.  Overall does not feel well, has mild runny nose no known exposures.  Does not feel like the home medications have been helping her.  COVID testing was negative.          Review of Systems   HENT:  Positive for sore throat.    Respiratory:  Positive for cough.    All other systems reviewed and are negative.           Objective    /77 (BP Location: Right arm, Patient Position: Sitting, Cuff Size: Adult Large)   Pulse 58   Temp 98.4  F (36.9  C) (Tympanic)   Resp 16   Ht 1.575 m (5' 2\")   Wt 83.9 kg (185 lb)   SpO2 98%   BMI 33.84 kg/m    Body mass index is 33.84 " kg/m .  Physical Exam  Constitutional:       Appearance: Normal appearance.   HENT:      Head: Normocephalic.      Right Ear: Tympanic membrane, ear canal and external ear normal.      Left Ear: Tympanic membrane, ear canal and external ear normal.      Nose: Nose normal.      Mouth/Throat:      Mouth: Mucous membranes are moist.      Pharynx: Oropharynx is clear. Posterior oropharyngeal erythema present.   Cardiovascular:      Rate and Rhythm: Normal rate and regular rhythm.      Heart sounds: Normal heart sounds.   Pulmonary:      Effort: Pulmonary effort is normal.      Breath sounds: Normal breath sounds.   Musculoskeletal:      Cervical back: Normal range of motion.   Lymphadenopathy:      Cervical: No cervical adenopathy.   Skin:     General: Skin is warm and dry.   Neurological:      Mental Status: She is alert and oriented to person, place, and time.   Psychiatric:         Mood and Affect: Mood normal.         Behavior: Behavior normal.         Thought Content: Thought content normal.         Judgment: Judgment normal.

## 2024-01-14 ENCOUNTER — NURSE TRIAGE (OUTPATIENT)
Dept: NURSING | Facility: CLINIC | Age: 49
End: 2024-01-14
Payer: COMMERCIAL

## 2024-01-14 NOTE — TELEPHONE ENCOUNTER
Nurse Triage SBAR    Is this a 2nd Level Triage? NO    Situation: Lab result request    Background: Patient seen in clinic on Friday and was tested for strep and influenza, was found to be negative. Patient had a sputum sample was sent for culture and she was instructed to call if she didn't hear anything. Seeking results    Assessment:  Patient declined triage or other concerns.     Protocol Recommended Disposition:   No disposition on file.    Recommendation: Per lab results and physician comment, patient was negative for influenza and strep; no other results shown. Shared this with patient. Patient expressed thanks and disconnected call; no further questions at this time.      Aye Almonte, RN on 1/14/2024 at 12:48 AM      Reason for Disposition   Health Information question, no triage required and triager able to answer question    Protocols used: Information Only Call - No Triage-A-

## 2024-01-15 ENCOUNTER — OFFICE VISIT (OUTPATIENT)
Dept: FAMILY MEDICINE | Facility: CLINIC | Age: 49
End: 2024-01-15
Payer: COMMERCIAL

## 2024-01-15 VITALS
RESPIRATION RATE: 16 BRPM | BODY MASS INDEX: 34.74 KG/M2 | WEIGHT: 184 LBS | DIASTOLIC BLOOD PRESSURE: 76 MMHG | SYSTOLIC BLOOD PRESSURE: 114 MMHG | HEART RATE: 76 BPM | TEMPERATURE: 97 F | HEIGHT: 61 IN | OXYGEN SATURATION: 100 %

## 2024-01-15 DIAGNOSIS — J02.9 SORE THROAT: Primary | ICD-10-CM

## 2024-01-15 LAB
DEPRECATED S PYO AG THROAT QL EIA: NEGATIVE
GROUP A STREP BY PCR: NOT DETECTED

## 2024-01-15 PROCEDURE — 99213 OFFICE O/P EST LOW 20 MIN: CPT | Performed by: FAMILY MEDICINE

## 2024-01-15 PROCEDURE — 87651 STREP A DNA AMP PROBE: CPT | Performed by: FAMILY MEDICINE

## 2024-01-15 RX ORDER — BENZOCAINE AND MENTHOL, UNSPECIFIED FORM 15; 2.3 MG/1; MG/1
LOZENGE ORAL
Status: CANCELLED | OUTPATIENT
Start: 2024-01-15

## 2024-01-15 ASSESSMENT — ENCOUNTER SYMPTOMS
COUGH: 1
SORE THROAT: 1

## 2024-01-15 NOTE — PROGRESS NOTES
"  Assessment & Plan     Sore throat  Discussed with patient that overwhelmingly likely viral URI.  Symptomatic therapies discussed.  Suggested: push fluids and maintain hydration, rest, and return office visit prn if symptoms persist or worsen. Lack of antibiotic effectiveness for viral illnesses discussed with patient.   Discussed safe NSAID dosing.  Could have 400 mg to 600 mg of ibuprofen every 6-8 hours quite safely.  Call or return to clinic prn if these symptoms worsen or fail to improve as anticipated-- if not better in 2 to 3 days would consider empiric antibiotic.  - Streptococcus A Rapid Screen w/Reflex to PCR - Clinic Collect  - Group A Streptococcus PCR Throat Swab       BMI:   Estimated body mass index is 35.19 kg/m  as calculated from the following:    Height as of this encounter: 1.54 m (5' 0.63\").    Weight as of this encounter: 83.5 kg (184 lb).     Angel Ashley MD  [facility]    Viktoria Chua is a 48 year old, presenting for the following health issues:  Pharyngitis, Nasal Congestion, and Cough        1/15/2024     3:24 PM   Additional Questions   Roomed by [facility]   Accompanied by self       History of Present Illness       Reason for visit:  I have a sore throat, cough and runny nose    She eats 2-3 servings of fruits and vegetables daily.She consumes 2 sweetened beverage(s) daily.She exercises with enough effort to increase her heart rate 10 to 19 minutes per day.  She exercises with enough effort to increase her heart rate 3 or less days per week.   She is taking medications regularly.     Acute Illness  Acute illness concerns: sore throat  Onset/Duration: 8 days  Symptoms:  Fever: YES on Tuesday  Chills/Sweats: No  Headache (location?): No  Sinus Pressure: YES  Conjunctivitis:  YES right side sujit  Ear Pain: no  Rhinorrhea: YES  Congestion: YES  Sore Throat: YES  Cough: YES-productive of clear sputum  Wheeze: No  Decreased Appetite: No, but it hurts to " "eat  Nausea: No  Vomiting: No  Diarrhea: No  Sick/Strep Exposure: No  Therapies tried and outcome: steroids at 1/12 visit  Theraflu  Ibuprofen.    Trying to take as little as ibuprofen as possible.  Only 1 dose yesterday.  Still feels pain in throat.  Feels a heaviness or pressure.  Home test for COVID-negative.  Son also has a cold.    Review of Systems   HENT:  Positive for sore throat.    Respiratory:  Positive for cough.           Objective    /76 (BP Location: Left arm, Patient Position: Sitting, Cuff Size: Adult Regular)   Pulse 76   Temp 97  F (36.1  C) (Temporal)   Resp 16   Ht 1.54 m (5' 0.63\")   Wt 83.5 kg (184 lb)   LMP  (LMP Unknown)   SpO2 100%   BMI 35.19 kg/m    Body mass index is 35.19 kg/m .  Physical Exam   GENERAL: alert, not distressed  EYES: PERRL/EOMI, no scleral icterus, mild right-sided conjunctival injection  EARS: normal tympanic membranes and external auditory canals bilaterally  PHARYNX: Mild erythema or exudates  MOUTH: well hydrated mucosa, no lesions  NECK: no lymphadenopathy or thyroid nodules  CHEST: clear, no rales, rhonchi, or wheezes  CARDIAC: regular without murmur, gallop, or rub      Results for orders placed or performed in visit on 01/15/24   Streptococcus A Rapid Screen w/Reflex to PCR - Clinic Collect     Status: Normal    Specimen: Throat; Swab   Result Value Ref Range    Group A Strep antigen Negative Negative             Prior to immunization administration, verified patients identity using patient s name and date of birth. Please see Immunization Activity for additional information.     Screening Questionnaire for Adult Immunization    Are you sick today?   Yes   Do you have allergies to medications, food, a vaccine component or latex?   Yes   Have you ever had a serious reaction after receiving a vaccination?   No   Do you have a long-term health problem with heart, lung, kidney, or metabolic disease (e.g., diabetes), asthma, a blood disorder, no spleen, " complement component deficiency, a cochlear implant, or a spinal fluid leak?  Are you on long-term aspirin therapy?   No   Do you have cancer, leukemia, HIV/AIDS, or any other immune system problem?   No   Do you have a parent, brother, or sister with an immune system problem?   No   In the past 3 months, have you taken medications that affect  your immune system, such as prednisone, other steroids, or anticancer drugs; drugs for the treatment of rheumatoid arthritis, Crohn s disease, or psoriasis; or have you had radiation treatments?   No   Have you had a seizure, or a brain or other nervous system problem?   No   During the past year, have you received a transfusion of blood or blood    products, or been given immune (gamma) globulin or antiviral drug?   No   For women: Are you pregnant or is there a chance you could become       pregnant during the next month?   No   Have you received any vaccinations in the past 4 weeks?   No     Immunization questionnaire was positive for at least one answer.  Notified provider.      Patient instructed to remain in clinic for 15 minutes afterwards, and to report any adverse reactions.     Screening performed by Fidencio Hill MA on 1/15/2024 at 3:30 PM.

## 2024-02-21 ENCOUNTER — OFFICE VISIT (OUTPATIENT)
Dept: FAMILY MEDICINE | Facility: CLINIC | Age: 49
End: 2024-02-21
Payer: COMMERCIAL

## 2024-02-21 VITALS
OXYGEN SATURATION: 98 % | TEMPERATURE: 97.2 F | DIASTOLIC BLOOD PRESSURE: 60 MMHG | SYSTOLIC BLOOD PRESSURE: 102 MMHG | BODY MASS INDEX: 35.98 KG/M2 | WEIGHT: 188.1 LBS | HEART RATE: 78 BPM

## 2024-02-21 DIAGNOSIS — R63.5 WEIGHT GAIN: ICD-10-CM

## 2024-02-21 DIAGNOSIS — R06.83 SNORING: Primary | ICD-10-CM

## 2024-02-21 DIAGNOSIS — M26.609 TMJ (TEMPOROMANDIBULAR JOINT SYNDROME): ICD-10-CM

## 2024-02-21 DIAGNOSIS — L21.9 SEBORRHEIC DERMATITIS: ICD-10-CM

## 2024-02-21 DIAGNOSIS — R68.84 JAW PAIN: ICD-10-CM

## 2024-02-21 DIAGNOSIS — R19.8 CLENCHING OF TEETH: ICD-10-CM

## 2024-02-21 DIAGNOSIS — E66.09 CLASS 2 OBESITY DUE TO EXCESS CALORIES WITHOUT SERIOUS COMORBIDITY WITH BODY MASS INDEX (BMI) OF 35.0 TO 35.9 IN ADULT: ICD-10-CM

## 2024-02-21 DIAGNOSIS — M26.623 BILATERAL TEMPOROMANDIBULAR JOINT PAIN: ICD-10-CM

## 2024-02-21 DIAGNOSIS — E66.812 CLASS 2 OBESITY DUE TO EXCESS CALORIES WITHOUT SERIOUS COMORBIDITY WITH BODY MASS INDEX (BMI) OF 35.0 TO 35.9 IN ADULT: ICD-10-CM

## 2024-02-21 PROBLEM — G47.33 OBSTRUCTIVE SLEEP APNEA: Status: RESOLVED | Noted: 2023-06-09 | Resolved: 2024-02-21

## 2024-02-21 LAB — TSH SERPL DL<=0.005 MIU/L-ACNC: 1.77 UIU/ML (ref 0.3–4.2)

## 2024-02-21 PROCEDURE — 99214 OFFICE O/P EST MOD 30 MIN: CPT | Performed by: FAMILY MEDICINE

## 2024-02-21 PROCEDURE — 36415 COLL VENOUS BLD VENIPUNCTURE: CPT | Performed by: FAMILY MEDICINE

## 2024-02-21 PROCEDURE — 84443 ASSAY THYROID STIM HORMONE: CPT | Performed by: FAMILY MEDICINE

## 2024-02-21 NOTE — PATIENT INSTRUCTIONS
For head,     Try tea tree shampoo for a few weeks     Also could try head and shoulders shampoo for a few weeks     Could also try scalp oil over the counter

## 2024-02-21 NOTE — PROGRESS NOTES
"  Assessment & Plan     (R06.83) Snoring  (primary encounter diagnosis)  Comment: Discussed that snoring doesn't mean she has apnea. She would like to discuss further with sleep med, consider whether she should have another sleep study as the note suggested. The patient indicates understanding of these issues and agrees with the plan.   Plan: Adult Sleep Eval & Management          Referral            (R68.84) Jaw pain  Comment: I have referred her to oral surgery to discuss her jaw issues   Plan: Oral Surgery Referral            (R19.8) Clenching of teeth  Comment: encouraged her to wear her mouth guard   Plan:     (M26.623) TMJ (temporomandibular joint syndrome)  - bilateral   Comment: discussed briefly. Soft foods, wear mouth guard. Consider surgery recommended by the dentist.   Plan:     (L21.9) Seborrheic dermatitis  Comment: she has already tried nizoral shampoo. I offered a few other over the counter options and gave her derm referral as requested.   Plan: Adult Dermatology  Referral            (R63.5) Weight gain  Comment: Discussed increasing fruits/vegies/fiber/water. Three meals a day - with reasonable portion sizes. Do not eat within 3 hours of bed. Increase activity to at least 30 minutes/day. Weigh weekly, not daily.   Also, checking thyroid. The patient indicates understanding of these issues and agrees with the plan.   Plan: TSH with free T4 reflex            (E66.09,  Z68.35) Class 2 obesity due to excess calories without serious comorbidity with body mass index (BMI) of 35.0 to 35.9 in adult  Comment: work on increasing activity level and consider program such as WW.   Plan:       BMI  Estimated body mass index is 35.98 kg/m  as calculated from the following:    Height as of 1/15/24: 1.54 m (5' 0.63\").    Weight as of this encounter: 85.3 kg (188 lb 1.6 oz).   Weight management plan: Discussed healthy diet and exercise guidelines      Viktoria Chua is a 48 year old, presenting " for the following health issues:  Sleep Problem      2/21/2024    11:49 AM   Additional Questions   Roomed by EILEEN Dawson   Accompanied by self     History of Present Illness       Reason for visit:  Sleep    She eats 2-3 servings of fruits and vegetables daily.She consumes 1 sweetened beverage(s) daily.She exercises with enough effort to increase her heart rate 9 or less minutes per day.  She exercises with enough effort to increase her heart rate 3 or less days per week.   She is taking medications regularly.     Sleep problem   Had sleep study back in July, results were received but she wants to discuss this in more depth.      July 2023:   Assessment:   No significant obstructive sleep apnea.  However, the home sleep study may not be sensitive enough to determine the true severity of any sleep apnea that may possibly be present  Sleep associated hypoxemia was not present.  Recommendations:  Consider repeat sleep study(sleep lab polysomnography) or Cognitive Behavorial Therapy for Insomnia.  Suggest optimizing sleep hygiene and avoiding sleep deprivation.  Weight management.      Current situation :   She says her  recorded her  sleeping - he is worried about her  She plays this for me from her phone:  Harsh snoring with grating sound, even breaths - no audible gasping  Denies apnea  She is not tired during the day    is sleeping well despite her snoring        Scalp/ derm problem  She shaves her hair because her scalp bothers her.   Has tenderness to the touch of her scalp will have bumps at times.   Was prescribed nizoral shampoo to use for 2 weeks- no relief, would like referral to see dermatologist.     Dentist said that she needs a mouth guard - the one the dentist made her is uncomfortable     Was told by her dentist that teeth are out of alignment and needs surgery but he sent her to someone who wasn't in her network. She is asking for a referral   Has some TMJ clicking as well     She  would like to lose weight. Wondering how she can do this. Hasn't tried anything yet     Review of systems:  No rash on body  No fatigue  No cp/sob    Objective    /60   Pulse 78   Temp 97.2  F (36.2  C) (Tympanic)   Wt 85.3 kg (188 lb 1.6 oz)   LMP  (LMP Unknown)   SpO2 98%   BMI 35.98 kg/m    Body mass index is 35.98 kg/m .  Physical Exam   GENERAL - Pt is alert and oriented in no acute distress.  Affect is appropriate. Good eye contact.  RESPIRATORY - Clear to auscultation bilaterally.  No wheezing noted  CV - RRR, no murmurs, rubs, gallops.   SKIN - hair is short. Some dryness on the scalp. No papules. No areas without any hair.         Signed Electronically by: Juli Gary MD

## 2024-02-22 PROBLEM — E66.09 CLASS 2 OBESITY DUE TO EXCESS CALORIES WITHOUT SERIOUS COMORBIDITY WITH BODY MASS INDEX (BMI) OF 35.0 TO 35.9 IN ADULT: Status: ACTIVE | Noted: 2024-02-22

## 2024-02-22 PROBLEM — E66.812 CLASS 2 OBESITY DUE TO EXCESS CALORIES WITHOUT SERIOUS COMORBIDITY WITH BODY MASS INDEX (BMI) OF 35.0 TO 35.9 IN ADULT: Status: ACTIVE | Noted: 2024-02-22

## 2024-04-02 PROBLEM — M26.623 BILATERAL TEMPOROMANDIBULAR JOINT PAIN: Status: ACTIVE | Noted: 2024-02-21

## 2024-05-03 ENCOUNTER — OFFICE VISIT (OUTPATIENT)
Dept: FAMILY MEDICINE | Facility: CLINIC | Age: 49
End: 2024-05-03
Payer: COMMERCIAL

## 2024-05-03 VITALS
HEART RATE: 58 BPM | BODY MASS INDEX: 35.91 KG/M2 | RESPIRATION RATE: 17 BRPM | TEMPERATURE: 97.7 F | WEIGHT: 190.2 LBS | OXYGEN SATURATION: 98 % | SYSTOLIC BLOOD PRESSURE: 114 MMHG | DIASTOLIC BLOOD PRESSURE: 72 MMHG | HEIGHT: 61 IN

## 2024-05-03 DIAGNOSIS — L30.9 DERMATITIS: Primary | ICD-10-CM

## 2024-05-03 DIAGNOSIS — R59.0 PREAURICULAR LYMPHADENOPATHY: ICD-10-CM

## 2024-05-03 PROCEDURE — 99214 OFFICE O/P EST MOD 30 MIN: CPT | Performed by: NURSE PRACTITIONER

## 2024-05-03 ASSESSMENT — PAIN SCALES - GENERAL: PAINLEVEL: SEVERE PAIN (6)

## 2024-05-03 NOTE — PROGRESS NOTES
Assessment & Plan     Dermatitis  Rash appears consistent with dermatitis of unknown cause.  I am reassured that similar rash on the right side of her neck has resolved.  We discussed trying hydrocortisone cream twice a day for 1 to 2 weeks.  She is advised to monitor for any worsening rash or new lesions/symptoms.  I recommend avoiding any new skin care products or exposures.    Preauricular lymphadenopathy  Patient has enlarged preauricular lymph node on the left side.  It is somewhat tender on palpation.  Her ear exam is otherwise normal without sign of infection.  I recommend closely monitoring this to ensure complete resolution over the next 2 to 3 weeks.  He is advised to return for evaluation if lymph node comes more enlarged or if she develops any new symptoms such as ear pain.      Viktoria Chua is a 49 year old, presenting for the following health issues:  Derm Problem (rash on neck x 2 weeks bump on outer left ear that is painful)      5/3/2024     8:45 AM   Additional Questions   Roomed by brianda OROURKE     Patient presents today for evaluation of a rash on the left side of her neck.  She developed an itchy rash on both sides of her neck about 2 weeks ago.  The right side has resolved.  The left side continues to cause itching and occasional burning.  She has not had any change to her skin care regimen, detergents, soaps etc.  She tried using baking soda on it without relief.  She denies any lesions or rashes elsewhere on her body.  She has not had episodes like this previously.  She noticed a bump in front of her left ear a couple of days ago.  She is not having any ear pain associated with it.  It is somewhat tender when she presses on it.  She denies headaches.  No fevers, chills, body aches or other systemic symptoms.    Review of Systems - pertinent positives noted in HPI, otherwise ROS is negative.        Objective    /72 (BP Location: Left arm, Patient Position: Sitting, Cuff Size:  "Adult Regular)   Pulse 58   Temp 97.7  F (36.5  C)   Resp 17   Ht 1.545 m (5' 0.83\")   Wt 86.3 kg (190 lb 3.2 oz)   LMP  (LMP Unknown)   SpO2 98%   BMI 36.14 kg/m    Body mass index is 36.14 kg/m .  Physical Exam     General Appearance:  Alert, cooperative, no distress, appears stated age   HEENT:  Preauricular lymphadenopathy of left ear noted.  Left ear canal and tympanic membrane appear normal otherwise.  Right tympanic membrane and canal appear normal.  No other acute findings noted on HEENT exam.   Neck: Supple, symmetrical, no adenopathy.   Lungs:   Clear to auscultation bilaterally, respirations unlabored.  No expiratory wheeze or inspiratory crackles noted.   Heart:  Regular rate and rhythm, S1, S2 normal, no murmur, rub or gallop   Skin: Warm, dry.  Patient has dry, somewhat flaky appearing rash noted on left side of neck.  It is pruritic.  No drainage, redness or swelling.  Skin color, texture, turgor normal, no rashes or lesions seen elsewhere on her body               Signed Electronically by: YURIDIA Perez CNP      "

## 2024-06-27 ENCOUNTER — TELEPHONE (OUTPATIENT)
Dept: FAMILY MEDICINE | Facility: CLINIC | Age: 49
End: 2024-06-27
Payer: COMMERCIAL

## 2024-06-27 NOTE — TELEPHONE ENCOUNTER
S-(situation): Patient calling in with TMJ pain    B-(background): Patient has a self reported history of TMJ pain due to an undiagnosed cause.     A-(assessment): Patient calling in with right sided TMJ pain rated 7/10 which began again yesterday. She reports having TMJ pain previously several times, which has never been successfully diagnosed. The pain is constant and she is using ibuprofen to help control the pain to 4-5/10 rating effectiveness. When she doesn't take ibuprofen, she states the pain she is feeling is an achy type of pain. She doesn't endorse any injury, chest pain, fever, SOB, or radiation to any other part of her body. The pain is more acute when she opens her jaw for things such as eating and drinking, but when she can keep it closed it feels bearable. She would like to be evaluated sometime next week, unless directed otherwise.       R-(recommendations): Appointment set for 7/2 with Dr. Toro. Please advise patient if another intervention is needed prior.     Nacho Zayas RN  [facility]

## 2024-07-02 ENCOUNTER — OFFICE VISIT (OUTPATIENT)
Dept: FAMILY MEDICINE | Facility: CLINIC | Age: 49
End: 2024-07-02
Payer: COMMERCIAL

## 2024-07-02 VITALS
HEIGHT: 61 IN | HEART RATE: 52 BPM | OXYGEN SATURATION: 98 % | DIASTOLIC BLOOD PRESSURE: 72 MMHG | BODY MASS INDEX: 36 KG/M2 | WEIGHT: 190.7 LBS | RESPIRATION RATE: 18 BRPM | SYSTOLIC BLOOD PRESSURE: 118 MMHG

## 2024-07-02 DIAGNOSIS — R51.9 RIGHT SIDED FACIAL PAIN: Primary | ICD-10-CM

## 2024-07-02 PROCEDURE — 99214 OFFICE O/P EST MOD 30 MIN: CPT | Performed by: FAMILY MEDICINE

## 2024-07-02 ASSESSMENT — PAIN SCALES - GENERAL: PAINLEVEL: MODERATE PAIN (4)

## 2024-07-02 NOTE — PROGRESS NOTES
"Toma White  /72   Pulse 52   Resp 18   Ht 1.543 m (5' 0.75\")   Wt 86.5 kg (190 lb 11.2 oz)   LMP  (LMP Unknown)   SpO2 98%   BMI 36.33 kg/m       Assessment/Plan:                Toma was seen today for tmj.    Diagnoses and all orders for this visit:    Right sided facial pain  -     Spine  Referral; Future         DISCUSSION  Provided reassurance that the acute pain does not seem to be caused by anything significant that I can identify and it is certainly reassuring that the pain is now back to its normal baseline level.  We did discuss the potential that her pain may be emanating from a cervical spine dysfunction source.  This is not completely clear based on my limited assessment today but I think based on the complexity of her situation with her thorough workup thus far that this would be the next most logical area to investigate.  We will refer her to the spine clinic to get their opinion on whether or not there would be a benefit of any further investigation regarding her cervical spine.  General: Patient alert no signs of distress  Subjective:     HPI:    Toma White is a 49 year old female is here today to discuss her complex right-sided facial pain concerns.  She has a pain that is located just in front of the ear near the angle of the jaw on the right side.  Patient reports this pain has been present for about 2 years.  She has had workup that has included dental visits, ENT consultations, chronic facial pain consultations, she has had MRI scans of the region, she has had CT scans, she has seen her dentist and is wearing a bite guard.  There is no definitive conclusion as to the diagnosis per patient report.  There was some consideration this may be an atypical presentation for TMJ pain.  Patient states she is primarily here because last week she had significantly worsened pain in the area.  She called for advice and was directed to make an appointment.  Patient reports the " pain is since settled back down to his usual baseline level.  She expresses a lot of frustration regarding her ongoing pain and lack of a definitive diagnosis.    She describes noticing a clicking sensation with movement of her neck or jaw that is in the area near the posterior angle of the jaw on the right side.  She does not have any history of any neck injuries.  She denies any radiation of pain to other areas she denies any neurologic symptoms such as weakness numbness or tingling.    ROS:  Complete review of systems is obtained.  Other than the specific considerations noted above complete review of systems is negative.          Objective:   Medications:  No current outpatient medications on file.     No current facility-administered medications for this visit.        Allergies:     Allergies   Allergen Reactions    Animal Dander     Cats     Cockroach     Mold     Seasonal Allergies     Adhesive Tape-Silicones [Adhesive Tape] Rash     Was seen in the hospital and got rash where adhesive-tape was placed         Social History     Socioeconomic History    Marital status:      Spouse name: Not on file    Number of children: Not on file    Years of education: Not on file    Highest education level: Not on file   Occupational History    Not on file   Tobacco Use    Smoking status: Never     Passive exposure: Never    Smokeless tobacco: Never   Vaping Use    Vaping status: Never Used   Substance and Sexual Activity    Alcohol use: No    Drug use: No    Sexual activity: Yes     Partners: Male   Other Topics Concern    Not on file   Social History Narrative    Not on file     Social Determinants of Health     Financial Resource Strain: Low Risk  (1/15/2024)    Financial Resource Strain     Within the past 12 months, have you or your family members you live with been unable to get utilities (heat, electricity) when it was really needed?: No   Food Insecurity: Low Risk  (1/15/2024)    Food Insecurity     Within  the past 12 months, did you worry that your food would run out before you got money to buy more?: No     Within the past 12 months, did the food you bought just not last and you didn t have money to get more?: No   Transportation Needs: Low Risk  (1/15/2024)    Transportation Needs     Within the past 12 months, has lack of transportation kept you from medical appointments, getting your medicines, non-medical meetings or appointments, work, or from getting things that you need?: No   Physical Activity: Not on file   Stress: Not on file   Social Connections: Not on file   Interpersonal Safety: Low Risk  (10/10/2023)    Interpersonal Safety     Do you feel physically and emotionally safe where you currently live?: Yes     Within the past 12 months, have you been hit, slapped, kicked or otherwise physically hurt by someone?: No     Within the past 12 months, have you been humiliated or emotionally abused in other ways by your partner or ex-partner?: No   Housing Stability: Low Risk  (1/15/2024)    Housing Stability     Do you have housing? : Yes     Are you worried about losing your housing?: No       Family History   Problem Relation Age of Onset    No Known Problems Mother     No Known Problems Father         Most Recent Immunizations   Administered Date(s) Administered    COVID-19 Bivalent 12+ (Pfizer) 10/07/2022    COVID-19 MONOVALENT 12+ (Pfizer) 12/10/2021    Influenza (IIV3) PF 11/12/2009    Influenza Vaccine 18-64 (Flublok) 09/22/2014    Influenza Vaccine >6 months,quad, PF 10/10/2023    Influenza Vaccine, 6+MO IM (QUADRIVALENT W/PRESERVATIVES) 11/06/2017    Influenza, seasonal, injectable, PF 10/22/2010    Mantoux Tuberculin Skin Test 08/18/2008    TDAP (Adacel,Boostrix) 09/22/2014        Wt Readings from Last 3 Encounters:   07/02/24 86.5 kg (190 lb 11.2 oz)   05/03/24 86.3 kg (190 lb 3.2 oz)   02/21/24 85.3 kg (188 lb 1.6 oz)        BP Readings from Last 6 Encounters:   07/02/24 118/72   05/03/24 114/72  "  02/21/24 102/60   01/15/24 114/76   01/12/24 136/77   10/22/23 128/65        Hemoglobin A1C   Date Value Ref Range Status   10/10/2023 5.6 0.0 - 5.6 % Final     Comment:     Normal <5.7%   Prediabetes 5.7-6.4%    Diabetes 6.5% or higher     Note: Adopted from ADA consensus guidelines.   10/31/2022 5.5 0.0 - 5.6 % Final     Comment:     Normal <5.7%   Prediabetes 5.7-6.4%    Diabetes 6.5% or higher     Note: Adopted from ADA consensus guidelines.   10/14/2021 5.3 <=5.6 % Final     Comment:       Prediabetes: 5.7 to 6.4%        Diabetes:  >=6.5%     Patients with Hgb F >5%, total bilirubin >10.0 mg/dL, abnormal red cell turnover, severe renal or hepatic disease or malignancy should not have this A1C method used to diagnose or monitor diabetes.               PHYSICAL EXAM:    /72   Pulse 52   Resp 18   Ht 1.543 m (5' 0.75\")   Wt 86.5 kg (190 lb 11.2 oz)   LMP  (LMP Unknown)   SpO2 98%   BMI 36.33 kg/m       General: Patient alert no signs of distress    No conjunctival irritation or scleral icterus.  No asymmetry of the musculature of the face.  Patient points to the area just posterior to the angle of the jaw as being the source of the greatest pain.  I palpated no abnormalities in this region.  In the ear canal there is no sign of irritation there is no obstruction in the ear canal the tympanic membrane appears grossly normal.  Patient reports noticing a clicking sound with movement of her neck.  The clicking sound is not audible to the examiner but seems to be audible to her clearly.  It occurs with both tilting of the head as well as forward                          "

## 2024-07-24 ENCOUNTER — TELEPHONE (OUTPATIENT)
Dept: PHYSICAL MEDICINE AND REHAB | Facility: CLINIC | Age: 49
End: 2024-07-24

## 2024-07-24 ENCOUNTER — OFFICE VISIT (OUTPATIENT)
Dept: PHYSICAL MEDICINE AND REHAB | Facility: CLINIC | Age: 49
End: 2024-07-24
Payer: COMMERCIAL

## 2024-07-24 ENCOUNTER — HOSPITAL ENCOUNTER (OUTPATIENT)
Dept: GENERAL RADIOLOGY | Facility: HOSPITAL | Age: 49
Discharge: HOME | End: 2024-07-24
Attending: STUDENT IN AN ORGANIZED HEALTH CARE EDUCATION/TRAINING PROGRAM | Admitting: STUDENT IN AN ORGANIZED HEALTH CARE EDUCATION/TRAINING PROGRAM
Payer: COMMERCIAL

## 2024-07-24 VITALS
HEIGHT: 62 IN | OXYGEN SATURATION: 98 % | SYSTOLIC BLOOD PRESSURE: 135 MMHG | BODY MASS INDEX: 35.04 KG/M2 | HEART RATE: 54 BPM | DIASTOLIC BLOOD PRESSURE: 62 MMHG | WEIGHT: 190.4 LBS

## 2024-07-24 DIAGNOSIS — M47.812 CERVICAL SPONDYLOSIS: ICD-10-CM

## 2024-07-24 DIAGNOSIS — R51.9 RIGHT SIDED FACIAL PAIN: ICD-10-CM

## 2024-07-24 DIAGNOSIS — M47.812 CERVICAL SPONDYLOSIS: Primary | ICD-10-CM

## 2024-07-24 PROCEDURE — 72050 X-RAY EXAM NECK SPINE 4/5VWS: CPT

## 2024-07-24 PROCEDURE — 99204 OFFICE O/P NEW MOD 45 MIN: CPT | Performed by: STUDENT IN AN ORGANIZED HEALTH CARE EDUCATION/TRAINING PROGRAM

## 2024-07-24 ASSESSMENT — PAIN SCALES - GENERAL: PAINLEVEL: MODERATE PAIN (4)

## 2024-07-24 NOTE — PATIENT INSTRUCTIONS
~Spine Center Scheduling #[phone].  ~Please call our [facility] Nurse Navigation #[phone] with any questions or concerns about your treatment plan, if symptoms worsen and you would like to be seen urgently, or if you have problems controlling bladder and bowel function.  ~For any future flareups or new symptoms, recommend follow-up in clinic or contact the nurse navigator line.  ~Please note that any My Chart messages may take multiple days for a response due to the high volume of patients seen in clinic.  Anything sent Friday night or after will be answered the following week when able.     An injection has been ordered today to potentially help with your pain symptoms. These injections do not fix what is going on in your back, therefore they typically do not take away the pain completely, however they can many times help improve symptoms. Injections should always be completed along with other modalities such as physical therapy for the best long term outcomes. If injections alone are done, then pain will likely return.     [facility] Injection Requirements    A  is required for all fluoroscopically-guided injections.  Injection appointments may be cancelled if there are signs/symptoms of an active infection or if the patient is being actively treated with antibiotics for a diagnosed infection.  Patients may have their steroid injection cancelled if they have had another steroid injection within 2 weeks.  Diabetic patients will have their blood glucose levels checked the day of their injection and the appointment will be rescheduled if the blood glucose level is 300 or higher.  Patients with allergies to cortisone, local anesthetics, iodine, or contrast dye should contact the Spine Center to further discuss these considerations.  Patients scheduled for medial branch block diagnostic injections should refrain from taking pain medication the day of the  procedure.  The medial branch block injection appointment will be rescheduled if the patient's pain rating is not 5/10 or greater at the time of the procedure.  Patients taking warfarin/Coumadin will have their INR checked the day of the procedure and the procedure may be rescheduled if the INR is greater than 3.0.  Please contact the Spine Center (#[phone]) if you are taking any prescription blood-thinning medications (warfarin, Plavix, Lovenox, Eliquis, Brilinta, Effient, etc.) as special dosing adjustments may need to be made depending on the type of injection you are scheduled to receive.  It is recommended that you delay having your steroid injection if you have received a flu shot or shingles vaccine within 2 weeks.

## 2024-07-24 NOTE — TELEPHONE ENCOUNTER
----- Message from Zechariah Musa sent at 7/24/2024 11:33 AM CDT -----  No evidence of instability, no degenerative/arthritis changes noted. Would not recommend going forward with injection as there is no area of arthritis to target. Recommend continued follow-up with other specialists for pain around ear.

## 2024-07-24 NOTE — LETTER
7/24/2024      Toma White  [address]  [facility] [address]      Dear Colleague,    Thank you for referring your patient, Toma White, to the [facility]. Please see a copy of my visit note below.    ASSESSMENT:  Toma White is a 49 year old female presents for consultation at the request of Eric Toro who presents today for new patient evaluation of :         Diagnoses and all orders for this visit:  Cervical spondylosis  -     XR Cervical Spine w Flex/Ext G/E 4 Views; Future  Right sided facial pain  -     Spine  Referral       Patient is neurologically intact on exam. No myelopathic or red flag symptoms.    Patient is a 49-year-old female presenting for evaluation of pain around the right mastoid process and feeling of fullness in the right ear.  Patient has undergone extensive evaluation for this including with ENT, dental, and various other specialists and endorses frustration with not understanding where her symptoms are coming from.  On examination she does have some tenderness to palpation along the right upper cervical paraspinals, and though it is less common this is potentially consistent with a referral pattern from upper cervical facet joint.  Patient and I discussed this possibility and we will start with x-rays of the cervical spine to assess for any spondylosis and pending that, we could potentially pursue diagnostic medial branch nerve blocks to target the right C2-C3 joint.  Patient is in agreement with this plan, and we will follow-up once the x-ray is completed.    PLAN:  Reviewed spine anatomy and disease process. Discussed diagnosis and treatment options with the patient today. A shared decision making model was used. The patient's values and choices were respected. The following represents what was discussed and decided upon by the provider and the patient.    1. DIAGNOSTIC TESTS  X-ray of cervical spine with bending views was  ordered for further evaluation    2. PHYSICAL THERAPY  Patient is already performing a home program, and was encouraged to continue doing so.  Patient previously completed PT for the symptoms without significant benefit  Discussed the importance of core strengthening, ROM, stretching exercises with the patient and how each of these entities is important in decreasing pain.  Explained to the patient that the purpose of physical therapy is to teach the patient a home exercise program.  These exercises need to be performed every day in order to decrease pain and prevent future occurrences of pain.  Likened it to brushing one's teeth.      3. MEDICATIONS:  Discussed multiple medication options today with patient. Discussed risks, side effects, and proper use of medications. Patient verbalized understanding.  No new medications ordered at this visit.    4. INTERVENTIONS:  Right C2-C3 Medial Branch Block. Patient was advised this is a diagnostic procedure which will help identify patient's candidacy for radiofrequency ablation treatment of the same nerve branches.  MBB is pending x-ray results    5. OTHER REFERRALS:  No other referrals at this time.    6. FOLLOW-UP  To review imaging results once imaging is completed    Advised patient to call the Spine Center if symptoms worsen or you have problems controlling bladder and bowel function.   ______________________________________________________________________    SUBJECTIVE:   Toma White  is a 49 year old female who presents today for new patient evaluation.    Patient reports she has focal pain located at and inferior to the right mastoid process.  She endorses that the pain starts there and feels like it goes into her ear and this is associated with fullness and pressure in the ear canal.  Patient reports the symptoms first started on her left side and she was diagnosed with earwax impaction which was cleared and symptoms resolved on the left.  They then started  happening on the right side and have persisted despite evaluations and treatments by various specialist including PT, dental, ENT, among others.  Patient reports that she was also diagnosed with TMJ syndrome and bruxism, and has been using a mouthguard which has been somewhat helpful.  She recently saw her primary care doctor and it was felt that the cervical spine might be contributing to her presentation so she was referred to the spine clinic.    Patient denies any pain in her neck or along her cervical spine.  Denies any pain in the shoulders or any radicular symptoms going down either arm.  No numbness or weakness of either arm.    Patient endorses that when she was in physical therapy they would do what sounds like cervical spine manual therapy with rotational movements and rotating to the right would feel like it released some of the pain.      No prior neck surgeries    -Treatment to Date: As above, multiple prior treatments and diagnostics done      Oswestry (MAX) Questionnaire         No data to display                Neck Disability Index:       No data to display                       -Medications:    No current outpatient medications on file.     No current facility-administered medications for this visit.       Allergies   Allergen Reactions     Animal Dander      Cats      Cockroach      Mold      Seasonal Allergies      Adhesive Tape-Silicones [Adhesive Tape] Rash     Was seen in the hospital and got rash where adhesive-tape was placed        Past Medical History:   Diagnosis Date     Abnormal Pap smear of cervix      Anemia      Herpes         Patient Active Problem List   Diagnosis     Human Papilloma Virus Infection     Cerv Pap Smear (+) Atyp Squamous Cells Undetermined Signif     Dysplasia of cervix, high grade WILLIS 2     Herpes Simplex Type II     Helicobacter Pylori (H. Pylori) Infection     Umbilical Hernia     Herpes Simplex Type I     Conductive Hearing Loss     Esophageal Reflux      "Impaired Glucose Tolerance Test     Vitamin D deficiency     Obesity (BMI 35.0-39.9) with comorbidity (H)     Vertigo     TMJ (temporomandibular joint syndrome)     Left shoulder pain, unspecified chronicity     Snoring     Clenching of teeth     Bilateral temporomandibular joint pain     Seborrheic dermatitis     Class 2 obesity due to excess calories without serious comorbidity with body mass index (BMI) of 35.0 to 35.9 in adult       Past Surgical History:   Procedure Laterality Date      SECTION       ND LAP,TUBAL CAUTERY Bilateral 2018    Procedure: LAPAROSCOPIC BILATERAL SALPINGECTOMY;  Surgeon: Kylah Rivas MD;  Location[facility];  Service: Gynecology     [facility]  DELIVERY ONLY      Description:  Section;  Recorded: 2011;       Family History   Problem Relation Age of Onset     No Known Problems Mother      No Known Problems Father        Reviewed past medical, surgical, and family history with patient found on new patient intake packet located in EMR Media tab.     SOCIAL HX: Reviewed    ROS: Specifically negative for bowel/bladder dysfunction, balance changes, headache, dizziness, foot drop, fevers, chills, appetite changes, nausea/vomiting, unexplained weight loss. Otherwise 13 systems reviewed are negative. Please see the patient's intake questionnaire from today for details.    OBJECTIVE:  /62 (BP Location: Left arm, Patient Position: Sitting, Cuff Size: Adult Regular)   Pulse 54   Ht 5' 2\" (1.575 m)   Wt 190 lb 6.4 oz (86.4 kg)   SpO2 98%   BMI 34.82 kg/m      PHYSICAL EXAMINATION:  --CONSTITUTIONAL: Vital signs as above. No acute distress. The patient is well nourished and well groomed.  --PSYCHIATRIC: The patient is awake, alert, oriented to person, place, time and answering questions appropriately with clear speech. Appropriate mood and affect   --RESPIRATORY: Normal rhythm and effort. No abnormal accessory muscle breathing patterns " noted.   --GROSS MOTOR: Easily arises from a seated position.  --CERVICAL SPINE: Inspection reveals no evidence of deformity. Range of motion is not limited in cervical flexion, extension, lateral rotation. Mild tenderness to palpation right upper cervical paraspinals and suboccipital muscles, no tenderness in spinous processes.  Spurling maneuver negative bilaterally.  --SHOULDERS: Full range of motion bilaterally.  --UPPER EXTREMITY MOTOR TESTING:  Wrist flexion left 5/5, right 5/5  Wrist extension left 5/5, right 5/5  Pronators left 5/5, right 5/5  Biceps left 5/5, right 5/5   Triceps left 5/5, right 5/5   Shoulder abduction left 5/5, right 5/5   left 5/5, right 5/5  --NEUROLOGIC: Sensation to upper extremities is intact bilaterally.  Negative Bee's bilaterally.    --VASCULAR: Warm upper limbs bilaterally. Capillary refill in the upper extremities is less than 1 second.    RESULTS: Available medical records from [facility] and any other outside records were reviewed today.     Imaging:  Available relevant imaging was personally reviewed and interpreted today. The images were shown to the patient and the findings were explained using a spine model.    No results found.       Again, thank you for allowing me to participate in the care of your patient.        Sincerely,        Zechariah Muas MD

## 2024-07-24 NOTE — PROGRESS NOTES
ASSESSMENT:  Toma White is a 49 year old female presents for consultation at the request of Eric Toro who presents today for new patient evaluation of :         Diagnoses and all orders for this visit:  Cervical spondylosis  -     XR Cervical Spine w Flex/Ext G/E 4 Views; Future  Right sided facial pain  -     Spine  Referral       Patient is neurologically intact on exam. No myelopathic or red flag symptoms.    Patient is a 49-year-old female presenting for evaluation of pain around the right mastoid process and feeling of fullness in the right ear.  Patient has undergone extensive evaluation for this including with ENT, dental, and various other specialists and endorses frustration with not understanding where her symptoms are coming from.  On examination she does have some tenderness to palpation along the right upper cervical paraspinals, and though it is less common this is potentially consistent with a referral pattern from upper cervical facet joint.  Patient and I discussed this possibility and we will start with x-rays of the cervical spine to assess for any spondylosis and pending that, we could potentially pursue diagnostic medial branch nerve blocks to target the right C2-C3 joint.  Patient is in agreement with this plan, and we will follow-up once the x-ray is completed.    PLAN:  Reviewed spine anatomy and disease process. Discussed diagnosis and treatment options with the patient today. A shared decision making model was used. The patient's values and choices were respected. The following represents what was discussed and decided upon by the provider and the patient.    1. DIAGNOSTIC TESTS  X-ray of cervical spine with bending views was ordered for further evaluation    2. PHYSICAL THERAPY  Patient is already performing a home program, and was encouraged to continue doing so.  Patient previously completed PT for the symptoms without significant benefit  Discussed the importance of  core strengthening, ROM, stretching exercises with the patient and how each of these entities is important in decreasing pain.  Explained to the patient that the purpose of physical therapy is to teach the patient a home exercise program.  These exercises need to be performed every day in order to decrease pain and prevent future occurrences of pain.  Likened it to brushing one's teeth.      3. MEDICATIONS:  Discussed multiple medication options today with patient. Discussed risks, side effects, and proper use of medications. Patient verbalized understanding.  No new medications ordered at this visit.    4. INTERVENTIONS:  Right C2-C3 Medial Branch Block. Patient was advised this is a diagnostic procedure which will help identify patient's candidacy for radiofrequency ablation treatment of the same nerve branches.  MBB is pending x-ray results    5. OTHER REFERRALS:  No other referrals at this time.    6. FOLLOW-UP  To review imaging results once imaging is completed    Advised patient to call the [facility] if symptoms worsen or you have problems controlling bladder and bowel function.   ______________________________________________________________________    SUBJECTIVE:   Toma White  is a 49 year old female who presents today for new patient evaluation.    Patient reports she has focal pain located at and inferior to the right mastoid process.  She endorses that the pain starts there and feels like it goes into her ear and this is associated with fullness and pressure in the ear canal.  Patient reports the symptoms first started on her left side and she was diagnosed with earwax impaction which was cleared and symptoms resolved on the left.  They then started happening on the right side and have persisted despite evaluations and treatments by various specialist including PT, dental, ENT, among others.  Patient reports that she was also diagnosed with TMJ syndrome and bruxism, and has been using a  mouthguard which has been somewhat helpful.  She recently saw her primary care doctor and it was felt that the cervical spine might be contributing to her presentation so she was referred to the spine clinic.    Patient denies any pain in her neck or along her cervical spine.  Denies any pain in the shoulders or any radicular symptoms going down either arm.  No numbness or weakness of either arm.    Patient endorses that when she was in physical therapy they would do what sounds like cervical spine manual therapy with rotational movements and rotating to the right would feel like it released some of the pain.      No prior neck surgeries    -Treatment to Date: As above, multiple prior treatments and diagnostics done      Oswestry (MAX) Questionnaire         No data to display                Neck Disability Index:       No data to display                       -Medications:    No current outpatient medications on file.     No current facility-administered medications for this visit.       Allergies   Allergen Reactions    Animal Dander     Cats     Cockroach     Mold     Seasonal Allergies     Adhesive Tape-Silicones [Adhesive Tape] Rash     Was seen in the hospital and got rash where adhesive-tape was placed        Past Medical History:   Diagnosis Date    Abnormal Pap smear of cervix     Anemia     Herpes         Patient Active Problem List   Diagnosis    Human Papilloma Virus Infection    Cerv Pap Smear (+) Atyp Squamous Cells Undetermined Signif    Dysplasia of cervix, high grade WILLIS 2    Herpes Simplex Type II    Helicobacter Pylori (H. Pylori) Infection    Umbilical Hernia    Herpes Simplex Type I    Conductive Hearing Loss    Esophageal Reflux    Impaired Glucose Tolerance Test    Vitamin D deficiency    Obesity (BMI 35.0-39.9) with comorbidity (H)    Vertigo    TMJ (temporomandibular joint syndrome)    Left shoulder pain, unspecified chronicity    Snoring    Clenching of teeth    Bilateral temporomandibular  "joint pain    Seborrheic dermatitis    Class 2 obesity due to excess calories without serious comorbidity with body mass index (BMI) of 35.0 to 35.9 in adult       Past Surgical History:   Procedure Laterality Date     SECTION      SD LAP,TUBAL CAUTERY Bilateral 2018    Procedure: LAPAROSCOPIC BILATERAL SALPINGECTOMY;  Surgeon: Kylah Rivas MD;  Location: [facility] [place];  Service: Gynecology    Z  DELIVERY ONLY      Description:  Section;  Recorded: 2011;       Family History   Problem Relation Age of Onset    No Known Problems Mother     No Known Problems Father        Reviewed past medical, surgical, and family history with patient found on new patient intake packet located in EMR Media tab.     SOCIAL HX: Reviewed    ROS: Specifically negative for bowel/bladder dysfunction, balance changes, headache, dizziness, foot drop, fevers, chills, appetite changes, nausea/vomiting, unexplained weight loss. Otherwise 13 systems reviewed are negative. Please see the patient's intake questionnaire from today for details.    OBJECTIVE:  /62 (BP Location: Left arm, Patient Position: Sitting, Cuff Size: Adult Regular)   Pulse 54   Ht 5' 2\" (1.575 m)   Wt 190 lb 6.4 oz (86.4 kg)   SpO2 98%   BMI 34.82 kg/m      PHYSICAL EXAMINATION:  --CONSTITUTIONAL: Vital signs as above. No acute distress. The patient is well nourished and well groomed.  --PSYCHIATRIC: The patient is awake, alert, oriented to person, place, time and answering questions appropriately with clear speech. Appropriate mood and affect   --RESPIRATORY: Normal rhythm and effort. No abnormal accessory muscle breathing patterns noted.   --GROSS MOTOR: Easily arises from a seated position.  --CERVICAL SPINE: Inspection reveals no evidence of deformity. Range of motion is not limited in cervical flexion, extension, lateral rotation. Mild tenderness to palpation right upper cervical paraspinals and " suboccipital muscles, no tenderness in spinous processes.  Spurling maneuver negative bilaterally.  --SHOULDERS: Full range of motion bilaterally.  --UPPER EXTREMITY MOTOR TESTING:  Wrist flexion left 5/5, right 5/5  Wrist extension left 5/5, right 5/5  Pronators left 5/5, right 5/5  Biceps left 5/5, right 5/5   Triceps left 5/5, right 5/5   Shoulder abduction left 5/5, right 5/5   left 5/5, right 5/5  --NEUROLOGIC: Sensation to upper extremities is intact bilaterally.  Negative Bee's bilaterally.    --VASCULAR: Warm upper limbs bilaterally. Capillary refill in the upper extremities is less than 1 second.    RESULTS: Available medical records from [facility] and any other outside records were reviewed today.     Imaging:  Available relevant imaging was personally reviewed and interpreted today. The images were shown to the patient and the findings were explained using a spine model.    No results found.

## 2024-08-02 ENCOUNTER — TELEPHONE (OUTPATIENT)
Dept: FAMILY MEDICINE | Facility: CLINIC | Age: 49
End: 2024-08-02

## 2024-08-02 ENCOUNTER — OFFICE VISIT (OUTPATIENT)
Dept: FAMILY MEDICINE | Facility: CLINIC | Age: 49
End: 2024-08-02
Payer: COMMERCIAL

## 2024-08-02 VITALS
DIASTOLIC BLOOD PRESSURE: 64 MMHG | BODY MASS INDEX: 35.26 KG/M2 | SYSTOLIC BLOOD PRESSURE: 102 MMHG | TEMPERATURE: 98 F | RESPIRATION RATE: 16 BRPM | HEART RATE: 59 BPM | HEIGHT: 62 IN | WEIGHT: 191.6 LBS | OXYGEN SATURATION: 97 %

## 2024-08-02 DIAGNOSIS — G56.01 CARPAL TUNNEL SYNDROME OF RIGHT WRIST: Primary | ICD-10-CM

## 2024-08-02 PROCEDURE — 99213 OFFICE O/P EST LOW 20 MIN: CPT | Performed by: NURSE PRACTITIONER

## 2024-08-02 NOTE — PROGRESS NOTES
Assessment and Plan:     Carpal tunnel syndrome of right wrist  Discussed symptomatic treatment.  Recommend over-the-counter acetaminophen as needed for any discomfort.  She will continue to wear a wrist brace.  Will refer to Ortho for further evaluation.  She is content with the plan.  - Orthopedic  Referral        Subjective:     Toma is a 49 year old female presenting to the clinic for concerns for paresthesias within the fingers of her right hand.  This developed 1 month ago after she cut up a chicken.  Paresthesias are constant.  She denies any pain within her hand or wrist.  She has not noticed erythema, swelling, bruising.  This is not waking her up at bedtime.  She has tried a wrist brace.  She has not tried any over-the-counter products for her symptoms.  She denies any injury.    Review of Systems: A complete 14 point review of systems was obtained and is negative or as stated in the history of present illness.    Social History     Socioeconomic History    Marital status:      Spouse name: Not on file    Number of children: Not on file    Years of education: Not on file    Highest education level: Not on file   Occupational History    Not on file   Tobacco Use    Smoking status: Never     Passive exposure: Never    Smokeless tobacco: Never   Vaping Use    Vaping status: Never Used   Substance and Sexual Activity    Alcohol use: No    Drug use: No    Sexual activity: Yes     Partners: Male   Other Topics Concern    Not on file   Social History Narrative    Not on file     Social Determinants of Health     Financial Resource Strain: Low Risk  (1/15/2024)    Financial Resource Strain     Within the past 12 months, have you or your family members you live with been unable to get utilities (heat, electricity) when it was really needed?: No   Food Insecurity: Low Risk  (1/15/2024)    Food Insecurity     Within the past 12 months, did you worry that your food would run out before you got money  to buy more?: No     Within the past 12 months, did the food you bought just not last and you didn t have money to get more?: No   Transportation Needs: Low Risk  (1/15/2024)    Transportation Needs     Within the past 12 months, has lack of transportation kept you from medical appointments, getting your medicines, non-medical meetings or appointments, work, or from getting things that you need?: No   Physical Activity: Not on file   Stress: Not on file   Social Connections: Not on file   Interpersonal Safety: Low Risk  (8/2/2024)    Interpersonal Safety     Do you feel physically and emotionally safe where you currently live?: Yes     Within the past 12 months, have you been hit, slapped, kicked or otherwise physically hurt by someone?: No     Within the past 12 months, have you been humiliated or emotionally abused in other ways by your partner or ex-partner?: No   Housing Stability: Low Risk  (1/15/2024)    Housing Stability     Do you have housing? : Yes     Are you worried about losing your housing?: No       Active Ambulatory Problems     Diagnosis Date Noted    Human Papilloma Virus Infection     Cerv Pap Smear (+) Atyp Squamous Cells Undetermined Signif     Dysplasia of cervix, high grade WILLIS 2     Herpes Simplex Type II     Helicobacter Pylori (H. Pylori) Infection     Umbilical Hernia     Herpes Simplex Type I     Conductive Hearing Loss     Esophageal Reflux     Impaired Glucose Tolerance Test     Vitamin D deficiency 05/05/2016    Obesity (BMI 35.0-39.9) with comorbidity (H) 05/18/2021    Vertigo 10/19/2023    TMJ (temporomandibular joint syndrome) 10/19/2023    Left shoulder pain, unspecified chronicity 10/19/2023    Snoring 02/21/2024    Clenching of teeth 02/21/2024    Bilateral temporomandibular joint pain 02/21/2024    Seborrheic dermatitis 02/21/2024    Class 2 obesity due to excess calories without serious comorbidity with body mass index (BMI) of 35.0 to 35.9 in adult 02/22/2024     Resolved  "Ambulatory Problems     Diagnosis Date Noted    Pregnancy     Status post repeat low transverse  section 2014    Obesity (BMI 35.0-39.9) with comorbidity (H) 2021    Obesity (BMI 35.0-39.9) with comorbidity (H) 2021    Obstructive sleep apnea 2023     Past Medical History:   Diagnosis Date    Abnormal Pap smear of cervix     Anemia     Herpes        Family History   Problem Relation Age of Onset    No Known Problems Mother     No Known Problems Father        Objective:     /64   Pulse 59   Temp 98  F (36.7  C)   Resp 16   Ht 1.575 m (5' 2\")   Wt 86.9 kg (191 lb 9.6 oz)   LMP  (LMP Unknown)   SpO2 97%   BMI 35.04 kg/m      Patient is alert, in no obvious distress.   Skin: Warm, dry.    Lungs:  Clear to auscultation. Respirations even and unlabored.  No wheezing or rales noted.   Heart:  Regular rate and rhythm.  No murmurs, S3, S4, gallops, or rubs.    Musculoskeletal: She has full range of motion of her hand and wrist.  Tinel's and Phalen's are positive.        Answers submitted by the patient for this visit:  General Questionnaire (Submitted on 2024)  Chief Complaint: Chronic problems general questions HPI Form  How many servings of fruits and vegetables do you eat daily?: 2-3  On average, how many sweetened beverages do you drink each day (Examples: soda, juice, sweet tea, etc.  Do NOT count diet or artificially sweetened beverages)?: 1  How many minutes a day do you exercise enough to make your heart beat faster?: 20 to 29  How many days a week do you exercise enough to make your heart beat faster?: 3 or less  How many days per week do you miss taking your medication?: 0  General Concern (Submitted on 2024)  Chief Complaint: Chronic problems general questions HPI Form  What is the reason for your visit today?: handpain  When did your symptoms begin?: 3-4 weeks ago  What are your symptoms?: pain  How would you describe these symptoms?: Moderate  Are your " symptoms:: Staying the same  Have you had these symptoms before?: Yes  Have you tried or received treatment for these symptoms before?: Yes  Did that treatment work? : Yes  Please describe the treatment you had:: rest hand and wear brace

## 2024-08-02 NOTE — TELEPHONE ENCOUNTER
S-(situation):   Patient calling requesting appointment    B-(background):   Hx of Dysplasia of cervix, high grade WILLIS 2    Hx of right side being affected by above conditon     A-(assessment):   Ongoing tingling in right finger tips   Denies confusion   Denies vision changes   Able to grasp and pick things up normally   No other sx     R-(recommendations):   Requesting to be seen   Scheduled for visit 8/2/24.    YESY MylesN, RN  [facility]

## 2024-10-10 ENCOUNTER — NURSE TRIAGE (OUTPATIENT)
Dept: FAMILY MEDICINE | Facility: CLINIC | Age: 49
End: 2024-10-10

## 2024-10-10 ENCOUNTER — OFFICE VISIT (OUTPATIENT)
Dept: FAMILY MEDICINE | Facility: CLINIC | Age: 49
End: 2024-10-10
Payer: COMMERCIAL

## 2024-10-10 VITALS
TEMPERATURE: 99.5 F | OXYGEN SATURATION: 100 % | HEART RATE: 73 BPM | BODY MASS INDEX: 35.15 KG/M2 | HEIGHT: 62 IN | WEIGHT: 191 LBS | SYSTOLIC BLOOD PRESSURE: 109 MMHG | DIASTOLIC BLOOD PRESSURE: 63 MMHG

## 2024-10-10 DIAGNOSIS — H66.92 ACUTE OTITIS MEDIA, LEFT: ICD-10-CM

## 2024-10-10 DIAGNOSIS — J11.1 INFLUENZA-LIKE ILLNESS: Primary | ICD-10-CM

## 2024-10-10 DIAGNOSIS — R53.83 FATIGUE, UNSPECIFIED TYPE: ICD-10-CM

## 2024-10-10 DIAGNOSIS — R05.1 ACUTE COUGH: ICD-10-CM

## 2024-10-10 DIAGNOSIS — M79.10 MYALGIA: ICD-10-CM

## 2024-10-10 LAB
FLUAV AG SPEC QL IA: NEGATIVE
FLUBV AG SPEC QL IA: NEGATIVE

## 2024-10-10 PROCEDURE — 99213 OFFICE O/P EST LOW 20 MIN: CPT | Performed by: NURSE PRACTITIONER

## 2024-10-10 PROCEDURE — 87804 INFLUENZA ASSAY W/OPTIC: CPT | Performed by: NURSE PRACTITIONER

## 2024-10-10 PROCEDURE — 87635 SARS-COV-2 COVID-19 AMP PRB: CPT | Performed by: NURSE PRACTITIONER

## 2024-10-10 RX ORDER — BENZONATATE 200 MG/1
200 CAPSULE ORAL 3 TIMES DAILY PRN
Qty: 30 CAPSULE | Refills: 0 | Status: SHIPPED | OUTPATIENT
Start: 2024-10-10

## 2024-10-10 RX ORDER — ALBUTEROL SULFATE 90 UG/1
2 INHALANT RESPIRATORY (INHALATION) EVERY 6 HOURS PRN
Qty: 18 G | Refills: 0 | Status: SHIPPED | OUTPATIENT
Start: 2024-10-10 | End: 2024-11-01

## 2024-10-10 NOTE — PROGRESS NOTES
"  Assessment & Plan   Problem List Items Addressed This Visit    None  Visit Diagnoses       Influenza-like illness    -  Primary    Fatigue, unspecified type        Relevant Orders    Symptomatic COVID-19 Virus (Coronavirus) by PCR Nasopharyngeal    Influenza A/B antigen (Completed)    Myalgia        Relevant Orders    Symptomatic COVID-19 Virus (Coronavirus) by PCR Nasopharyngeal    Influenza A/B antigen (Completed)    Acute cough        Relevant Medications    albuterol (PROAIR HFA/PROVENTIL HFA/VENTOLIN HFA) 108 (90 Base) MCG/ACT inhaler    benzonatate (TESSALON) 200 MG capsule    Other Relevant Orders    Symptomatic COVID-19 Virus (Coronavirus) by PCR Nasopharyngeal    Influenza A/B antigen (Completed)    Acute otitis media, left            Differential:   Viral URI is more likely with her cough, myalgias, nasal drainage, fever. She does have rhonchi on exam however no signs of fluid or unstable vital signs to suggest a lower respiratory infection. She is able to converse well without pauses. No increased work of breathing or use of accessory muscles to suggest acute respiratory distress.     Influenza is ruled out with negative swab.     COVID is possible and possible cause of URI. Swab pending. Will follow up and offer treatment as indicated (she is within the 5 days of Paxlovid)     AOM is found incidential on exam. No ear pain or pressure. Left TM is full with significant erythema. Discussed watching and waiting at this time. She is aware to follow up if she develops ear symptoms; however, should improve without antibiotics.     BMI  Estimated body mass index is 34.93 kg/m  as calculated from the following:    Height as of this encounter: 1.575 m (5' 2\").    Weight as of this encounter: 86.6 kg (191 lb).     MEDICATIONS:        - Start taking Albuterol and Tessalon pearls PRN       - Continue other medications without change  Will follow up on respiratory results     Viktoria   Toma is a 49 year old, " presenting for the following health issues:  URI        10/10/2024     1:26 PM   Additional Questions   Roomed by Alejandrina ABRAHAM CMA     History of Present Illness       Back Pain:  She presents for follow up of back pain. Patient's back pain is a new problem.    Original cause of back pain: not sure  First noticed back pain: yesterday  Patient feels back pain: comes and goesLocation of back pain:  Right lower back and left lower back  Description of back pain: dull ache  Back pain spreads: nowhere    Since patient first noticed back pain, pain is: gradually improving  Does back pain interfere with her job:  Yes  On a scale of 1-10 (10 being the worst), patient describes pain as:  8  What makes back pain worse: lying down   Acupuncture: not tried  Acetaminophen: not tried  Activity or exercise: not tried  Chiropractor:  Not tried  Cold: not tried  Heat: not tried  Massage: not tried  Muscle relaxants: not tried  NSAIDS: helpful  Opioids: not tried  Physical Therapy: not tried  Rest: not helpful  Steroid Injection: not tried  Stretching: not tried  Surgery: not tried  TENS unit: not tried  Topical pain relievers: not tried  Other healthcare providers patient is seeing for back pain: None    Reason for visit:  Fever, body pain  Symptom onset:  1-3 days ago  Symptom intensity:  Severe  Symptom progression:  Improving  Had these symptoms before:  No  What makes it worse:  No  What makes it better:  Lbuprofen   She is taking medications regularly.              Symptoms: cc Present Absent Comment   Fever/Chills x   100   Fatigue  x     Headache   x    Muscle or Body  Aches x      Eye Irritation   x    Sneezing   x    Nasal Shahzad/Drg  x     Sinus Pressure/Pain   x    Dental pain   x    Sore Throat  x  Raspy    Swollen Glands   x    Ear Pain/Fullness       Cough  x  Dry    Wheeze  x     Chest Discomfort   x    Shortness of breath  x     Abdominal pain   x    Emesis    x    Diarrhea   x    Other   x      Symptom duration:  Started  "Monday, fever started yesterday    Symptom severity:     Treatments tried:     Contacts:  no     Patient reports started feeling unwell on Monday with fatigue.   Then developed fever and body aches. Up to 100 degrees  Reports that she gets short of breath when she talks too much however no signs of acute distress with talking during this appointment.   Taking ibuprofen which helps taking 400 mg every 4 hours  Reports muscle aches better today   Dry cough   No urinary issues   Home COVID test negative but    Not around any one sick   No rash   No asthma history     Review of Systems  Constitutional, HEENT, cardiovascular, pulmonary, gi and gu systems are negative, except as otherwise noted.      Objective    /63 (BP Location: Right arm, Patient Position: Sitting, Cuff Size: Adult Large)   Pulse 73   Temp 99.5  F (37.5  C) (Tympanic)   Ht 1.575 m (5' 2\")   Wt 86.6 kg (191 lb)   SpO2 100%   BMI 34.93 kg/m    Body mass index is 34.93 kg/m .  Physical Exam  Constitutional:       Appearance: Normal appearance.   HENT:      Right Ear: Tympanic membrane, ear canal and external ear normal.      Left Ear: Ear canal and external ear normal. Tympanic membrane is injected and bulging.      Nose: Nose normal.      Mouth/Throat:      Mouth: Mucous membranes are moist.      Pharynx: Posterior oropharyngeal erythema present.   Eyes:      Extraocular Movements: Extraocular movements intact.      Conjunctiva/sclera: Conjunctivae normal.      Pupils: Pupils are equal, round, and reactive to light.   Cardiovascular:      Rate and Rhythm: Normal rate and regular rhythm.      Pulses: Normal pulses.      Heart sounds: Normal heart sounds.   Pulmonary:      Effort: Pulmonary effort is normal.      Breath sounds: Examination of the right-upper field reveals rhonchi. Examination of the left-upper field reveals rhonchi. Rhonchi present.   Abdominal:      Palpations: Abdomen is soft.   Musculoskeletal:         General: Normal " range of motion.      Cervical back: Normal range of motion.   Skin:     General: Skin is warm.      Capillary Refill: Capillary refill takes less than 2 seconds.   Neurological:      Mental Status: She is oriented to person, place, and time.   Psychiatric:         Mood and Affect: Mood normal.         Behavior: Behavior normal.         Thought Content: Thought content normal.         Judgment: Judgment normal.        Results for orders placed or performed in visit on 10/10/24 (from the past 24 hour(s))   Influenza A/B antigen    Specimen: Nasopharyngeal; Swab   Result Value Ref Range    Influenza A antigen Negative Negative    Influenza B antigen Negative Negative    Narrative    Test results must be correlated with clinical data. If necessary, results should be confirmed by a molecular assay or viral culture.         YURIDIA Kolb student     I was present with the nurse practitioner student who participated in the service and in the documentation of the note. I have verified the history and personally preformed the physical exam and medical decision making. I agree with the assessment and plan of care as documented in the note.    Signed Electronically by: YURIDIA Elizabeth CNP

## 2024-10-10 NOTE — TELEPHONE ENCOUNTER
Patient called the clinic today with bodyaches, a wet nose, fever, and nosy breathing from the throat when breathing. Sounding like a crackling sound. Patient denied trouble breathing, throat pain.  Tested Negative for COVID home test.   Patient scheduled to be seen today at the [facility].   Reason for Disposition   Fever > 100.0 F (37.8 C) and has diabetes mellitus or a weak immune system (e.g., HIV positive, cancer chemotherapy, organ transplant, splenectomy, chronic steroids)    Additional Information   Negative: Difficulty breathing and not from stuffy nose (e.g., not relieved by cleaning out the nose)   Negative: Runny nose is caused by pollen or other allergies   Negative: Cough is main symptom   Negative: Sore throat is main symptom    Protocols used: Common Cold-A-[place]    Ailin Junior RN on 10/10/2024 at 9:37 AM

## 2024-10-11 LAB — SARS-COV-2 RNA RESP QL NAA+PROBE: NEGATIVE

## 2024-10-12 ENCOUNTER — APPOINTMENT (OUTPATIENT)
Dept: RADIOLOGY | Facility: HOSPITAL | Age: 49
End: 2024-10-12
Attending: PHYSICIAN ASSISTANT
Payer: COMMERCIAL

## 2024-10-12 ENCOUNTER — HOSPITAL ENCOUNTER (EMERGENCY)
Facility: HOSPITAL | Age: 49
Discharge: HOME | End: 2024-10-12
Admitting: PHYSICIAN ASSISTANT
Payer: COMMERCIAL

## 2024-10-12 VITALS
HEART RATE: 58 BPM | HEIGHT: 62 IN | OXYGEN SATURATION: 98 % | BODY MASS INDEX: 35.17 KG/M2 | TEMPERATURE: 98.6 F | DIASTOLIC BLOOD PRESSURE: 68 MMHG | RESPIRATION RATE: 22 BRPM | SYSTOLIC BLOOD PRESSURE: 112 MMHG | WEIGHT: 191.14 LBS

## 2024-10-12 DIAGNOSIS — J18.9 PNEUMONIA: Primary | ICD-10-CM

## 2024-10-12 DIAGNOSIS — R05.9 COUGH: ICD-10-CM

## 2024-10-12 PROCEDURE — 99283 EMERGENCY DEPT VISIT LOW MDM: CPT | Mod: 25 | Performed by: PHYSICIAN ASSISTANT

## 2024-10-12 PROCEDURE — 71046 X-RAY EXAM CHEST 2 VIEWS: CPT

## 2024-10-12 RX ORDER — DOXYCYCLINE 100 MG/1
100 CAPSULE ORAL 2 TIMES DAILY
Qty: 14 CAPSULE | Refills: 0 | Status: SHIPPED | OUTPATIENT
Start: 2024-10-12 | End: 2024-10-19

## 2024-10-12 ASSESSMENT — COLUMBIA-SUICIDE SEVERITY RATING SCALE - C-SSRS
2. HAVE YOU ACTUALLY HAD ANY THOUGHTS OF KILLING YOURSELF IN THE PAST MONTH?: NO
6. HAVE YOU EVER DONE ANYTHING, STARTED TO DO ANYTHING, OR PREPARED TO DO ANYTHING TO END YOUR LIFE?: NO
1. IN THE PAST MONTH, HAVE YOU WISHED YOU WERE DEAD OR WISHED YOU COULD GO TO SLEEP AND NOT WAKE UP?: NO

## 2024-10-12 ASSESSMENT — ACTIVITIES OF DAILY LIVING (ADL): ADLS_ACUITY_SCORE: 35

## 2024-10-12 NOTE — ED PROVIDER NOTES
EMERGENCY DEPARTMENT ENCOUNTER      NAME: Toma White  AGE: 49 year old female  YOB: 1975  MRN: [id number]  EVALUATION DATE & TIME: No admission date for patient encounter.    PCP: [facility] - [place]    ED PROVIDER: Nica Reese PA-C      Chief Complaint   Patient presents with    Fever    Generalized Body Aches         FINAL IMPRESSION:  1. Pneumonia    2. Cough          ED COURSE & MEDICAL DECISION MAKIN:17 AM I introduced myself to patient, performed initial HPI and examination.   8:58 AM Chest x-ray shows right upper lobe pneumonia, will discharge with abx      49 year old female with PMH Obesity, Vertigo, TMJ presents to the Emergency Department for evaluation of persistent cough.  Reports myalgias and fevers have resolved.  Was seen in urgent care on Tuesday with negative COVID and influenza test, discharged with albuterol inhaler.    Signs are unremarkable, patient is afebrile.  Oxygen 100%.  Exam with well-appearing female, no respiratory distress, no wheezing or crackles in the lungs.  Presentation most consistent with viral URI, not unexpected that patient would continue to have a cough 5 days after onset.  X-ray obtained to evaluate for underlying pneumonia, ultimately does show right upper lobe pneumonia. Will treat with antibiotics.  Nothing to suggest asthma exacerbation/reactive airway disease.    Patient already has albuterol inhaler and cough medicine.  Encouraged continued use.  Discussed expectations for cough, that it may linger for 2 to 3 weeks.  Instructed on close follow-up with PCP for recheck and red flag/indications to return to the emergency department.  All questions answered to the best my ability.  Patient is agreeable with plan.        Medical Decision Making  Obtained supplemental history:Supplemental history obtained?: No  Reviewed external records: External records reviewed?: No  Care impacted by chronic illness:Documented in Chart  Care  significantly affected by social determinants of health:N/A  Did you consider but not order tests?: Work up considered but not performed and documented in chart, if applicable  Did you interpret images independently?: Independent interpretation of ECG and images noted in documentation, when applicable.  Consultation discussion with other provider:Did you involve another provider (consultant, , pharmacy, etc.)?: No  Discharge. No recommendations on prescription strength medication(s). See documentation for any additional details.  Not Applicable        MEDICATIONS GIVEN IN THE EMERGENCY:  Medications - No data to display    NEW PRESCRIPTIONS STARTED AT TODAY'S ER VISIT  New Prescriptions    DOXYCYCLINE HYCLATE (VIBRAMYCIN) 100 MG CAPSULE    Take 1 capsule (100 mg) by mouth 2 times daily for 7 days.          =================================================================    HPI    Patient information was obtained from: Patient    Use of : N/A        Toma White is a 49 year old female with a pertinent history of Obesity, Vertigo, TMJ who presents to this ED by private car for evaluation of influenza-like illness. Reports cugh, tightness when breathing, body aches. Given albuterol inhaler and using at home but not helping.     Patient reports symptoms started Monday with cough, myalgias, fever.  Lasted for proximately 2 days but cough has persisted.  Reports urgent care thought she had a right ear infection on the left side but without pain the plan was to watch rather than treat with antibiotics.  Patient states she is still not having any pain from that ear.   No chest pain    Non-smoker, no history of asthma or COPD.      Per chart review: Patient was seen yesterday at clinic for influenza-like illness. AOM noted on exam, no pain/pressure. Left TM with significant erythema, plan to watch and wait and return if symptoms worsen. COVID and influenza negative.    REVIEW OF SYSTEMS   ROS negative  unless otherwise stated in HPI    PAST MEDICAL HISTORY:  Past Medical History:   Diagnosis Date    Abnormal Pap smear of cervix     Anemia     Herpes        PAST SURGICAL HISTORY:  Past Surgical History:   Procedure Laterality Date     SECTION      FL LAP,TUBAL CAUTERY Bilateral 2018    Procedure: LAPAROSCOPIC BILATERAL SALPINGECTOMY;  Surgeon: Kylah Rivas MD;  Location: [facility];  Service: Gynecology    [facility]  DELIVERY ONLY      Description:  Section;  Recorded: 2011;       CURRENT MEDICATIONS:    albuterol (PROAIR HFA/PROVENTIL HFA/VENTOLIN HFA) 108 (90 Base) MCG/ACT inhaler  benzonatate (TESSALON) 200 MG capsule  doxycycline hyclate (VIBRAMYCIN) 100 MG capsule        ALLERGIES:  Allergies   Allergen Reactions    Animal Dander     Cats     Cockroach     Mold     Seasonal Allergies     Adhesive Tape-Silicones [Adhesive Tape] Rash     Was seen in the hospital and got rash where adhesive-tape was placed        FAMILY HISTORY:  Family History   Problem Relation Age of Onset    No Known Problems Mother     No Known Problems Father        SOCIAL HISTORY:   Social History     Socioeconomic History    Marital status:    Tobacco Use    Smoking status: Never     Passive exposure: Never    Smokeless tobacco: Never   Vaping Use    Vaping status: Never Used   Substance and Sexual Activity    Alcohol use: No    Drug use: No    Sexual activity: Yes     Partners: Male     Social Determinants of Health     Financial Resource Strain: Low Risk  (1/15/2024)    Financial Resource Strain     Within the past 12 months, have you or your family members you live with been unable to get utilities (heat, electricity) when it was really needed?: No   Food Insecurity: Low Risk  (1/15/2024)    Food Insecurity     Within the past 12 months, did you worry that your food would run out before you got money to buy more?: No     Within the past 12 months, did the food you bought just  "not last and you didn t have money to get more?: No   Transportation Needs: Low Risk  (1/15/2024)    Transportation Needs     Within the past 12 months, has lack of transportation kept you from medical appointments, getting your medicines, non-medical meetings or appointments, work, or from getting things that you need?: No   Interpersonal Safety: Low Risk  (8/2/2024)    Interpersonal Safety     Do you feel physically and emotionally safe where you currently live?: Yes     Within the past 12 months, have you been hit, slapped, kicked or otherwise physically hurt by someone?: No     Within the past 12 months, have you been humiliated or emotionally abused in other ways by your partner or ex-partner?: No   Housing Stability: Low Risk  (1/15/2024)    Housing Stability     Do you have housing? : Yes     Are you worried about losing your housing?: No       VITALS:  /78   Pulse 61   Temp 98.6  F (37  C) (Oral)   Resp 22   Ht 1.575 m (5' 2\")   Wt 86.7 kg (191 lb 2.2 oz)   LMP  (LMP Unknown)   SpO2 100%   BMI 34.96 kg/m      PHYSICAL EXAM    Constitutional: Well developed, Well nourished, NAD, GCS 15   HENT: Normocephalic, Atraumatic, Oropharynx clear.   Neck- Supple, Nontender. Normal ROM. No stridor.  Eyes: Conjunctiva normal. PERRL.  Respiratory: No respiratory distress, speaking in full sentences. Normal breath sounds, No wheezing  Cardiovascular: Normal heart rate, Regular rhythm, No murmurs. Chest wall nontender.   GI: Soft, nontender  Musculoskeletal: No deformities, Moves all extremities equally. Ambulatory without assistive device  Integument: Warm, Dry, No erythema, ecchymosis, or rash.  Neurologic: Alert & oriented x 3, Normal sensory function. No focal deficits.   Psychiatric: Affect normal, Judgment normal, Mood normal. Cooperative.      LAB:  All pertinent labs reviewed and interpreted.  Results for orders placed or performed during the hospital encounter of 10/12/24   Chest XR,  PA & LAT    " Impression    IMPRESSION: Right upper lobe pneumonia.           RADIOLOGY:  Reviewed all pertinent imaging. Please see official radiology report.  Chest XR,  PA & LAT   Final Result   IMPRESSION: Right upper lobe pneumonia.                EKG:    None    PROCEDURES:   None        Nica Reese PA-C  Emergency Medicine  [facility] EMERGENCY DEPARTMENT  [address] [place] [id number]  [phone]             Nica Reese PA-C  10/12/24 0826

## 2024-10-12 NOTE — ED TRIAGE NOTES
Pt having coughing, tightness when breathing.  Bodyaches since Monday.  Saw Doctor Tuesday and negative Covid and Flu and given albuterol inhaler but not improving.  Pt having intermittent wheezing at home

## 2024-10-12 NOTE — Clinical Note
Toma White was seen and treated in our emergency department on 10/12/2024.  She may return to work on 10/14/2024.       If you have any questions or concerns, please don't hesitate to call.      Nica Reese PA-C

## 2024-10-12 NOTE — DISCHARGE INSTRUCTIONS
Chest x-ray does show a pneumonia.  Start the antibiotics as prescribed.    Make sure you are drinking plenty of fluids to stay hydrated  Use tylenol and ibuprofen as needed for body aches  Use tessalon as needed for cough and you can use albuterol as needed for wheezing    Cough may linger for 2-3 weeks after initial illness.  Follow up in clinic in 1 week for re-check.  Return to the emergency department if you develop new fevers, shortness of breath, chest pain, worsening cough, or any other concerning symptoms. We would be happy to see you.

## 2024-10-14 ENCOUNTER — PATIENT OUTREACH (OUTPATIENT)
Dept: FAMILY MEDICINE | Facility: CLINIC | Age: 49
End: 2024-10-14
Payer: COMMERCIAL

## 2024-10-14 NOTE — TELEPHONE ENCOUNTER
Transitions of Care Outreach  Chief Complaint   Patient presents with    Hospital F/U       Most Recent Admission Date: 10/12/2024   Most Recent Admission Diagnosis:      Most Recent Discharge Date: 10/12/2024   Most Recent Discharge Diagnosis: Cough - R05.9  Pneumonia - J18.9     Transitions of Care Assessment    Discharge Assessment  How are you doing now that you are home?: Doing a little better  How are your symptoms? (Red Flag symptoms escalate to triage hotline per guidelines): Improved  Do you know how to contact your clinic care team if you have future questions or changes to your health status? : Yes  Does the patient have their discharge instructions? : Yes  Does the patient have questions regarding their discharge instructions? : No  Were you started on any new medications or were there changes to any of your previous medications? : Yes  Does the patient have all of their medications?: Yes  Do you have questions regarding any of your medications? : No    Follow up Plan     Discharge Follow-Up  Discharge follow up appointment scheduled in alignment with recommended follow up timeframe or Transitions of Risk Category? (Low = within 30 days; Moderate= within 14 days; High= within 7 days): Yes  Discharge Follow Up Appointment Date: 10/17/24  Discharge Follow Up Appointment Scheduled with?: Primary Care Provider    Future Appointments   Date Time Provider Department Center   10/17/2024 11:00 AM Alphonso Mcfarlane MD [facility]   10/23/2024  8:00 AM David Aquino DO [facility]   11/19/2024  9:30 AM Shell, Yandy L, APRN CNP [facility]       Outpatient Plan as outlined on AVS reviewed with patient.    For any urgent concerns, please contact our 24 hour nurse triage line: [phone] ([phone])       Ailin Hamm RN

## 2024-10-17 ENCOUNTER — TELEPHONE (OUTPATIENT)
Dept: FAMILY MEDICINE | Facility: CLINIC | Age: 49
End: 2024-10-17

## 2024-10-17 ENCOUNTER — OFFICE VISIT (OUTPATIENT)
Dept: FAMILY MEDICINE | Facility: CLINIC | Age: 49
End: 2024-10-17
Payer: COMMERCIAL

## 2024-10-17 VITALS
SYSTOLIC BLOOD PRESSURE: 124 MMHG | HEART RATE: 56 BPM | TEMPERATURE: 98 F | BODY MASS INDEX: 34.57 KG/M2 | DIASTOLIC BLOOD PRESSURE: 68 MMHG | OXYGEN SATURATION: 98 % | RESPIRATION RATE: 18 BRPM | WEIGHT: 189 LBS

## 2024-10-17 DIAGNOSIS — B37.9 CANDIDA INFECTION: Primary | ICD-10-CM

## 2024-10-17 DIAGNOSIS — B37.9 CANDIDA INFECTION: ICD-10-CM

## 2024-10-17 DIAGNOSIS — J18.9 PNEUMONIA OF RIGHT UPPER LOBE DUE TO INFECTIOUS ORGANISM: Primary | ICD-10-CM

## 2024-10-17 PROCEDURE — 99213 OFFICE O/P EST LOW 20 MIN: CPT | Performed by: FAMILY MEDICINE

## 2024-10-17 RX ORDER — METRONIDAZOLE 500 MG/1
500 TABLET ORAL ONCE
Status: ACTIVE | OUTPATIENT
Start: 2024-10-17

## 2024-10-17 RX ORDER — METRONIDAZOLE 500 MG/1
500 TABLET ORAL ONCE
Qty: 1 TABLET | Refills: 0 | Status: SHIPPED | OUTPATIENT
Start: 2024-10-17 | End: 2024-10-17

## 2024-10-17 ASSESSMENT — PAIN SCALES - GENERAL: PAINLEVEL: NO PAIN (0)

## 2024-10-17 NOTE — PROGRESS NOTES
"  Assessment & Plan     Pneumonia of right upper lobe due to infectious organism  Finish doxycycline    Candida infection  Treatment as follows  - metroNIDAZOLE (FLAGYL) tablet 500 mg        MED REC REQUIRED  Post Medication Reconciliation Status:   BMI  Estimated body mass index is 34.57 kg/m  as calculated from the following:    Height as of 10/12/24: 1.575 m (5' 2\").    Weight as of this encounter: 85.7 kg (189 lb).             Viktoria Chua is a 49 year old, presenting for the following health issues:  URI and Cough    HPI patient had myalgia cough sore throat prodrome of fever to 102.5 approximately 7 days ago COVID RSV flu were all ruled out via test patient finally went to emergency room was seen and evaluated exam negative part of rule out was a chest x-ray which showed right upper lobe pneumonia.  Patient was placed on doxycycline she reports improvement she has 2 days left of medication she is reporting vaginal itching now we will treat her for yeast vaginitis with high-dose Flagyl x 1 she will finish her doxycycline clinically her chest was clear she is feeling remarkably better follow-up if no improvement or recurrence              Review of Systems  Constitutional, HEENT, cardiovascular, pulmonary, GI, , musculoskeletal, neuro, skin, endocrine and psych systems are negative, except as otherwise noted.      Objective    /68   Pulse 56   Temp 98  F (36.7  C)   Resp 18   Wt 85.7 kg (189 lb)   LMP  (LMP Unknown)   SpO2 98%   BMI 34.57 kg/m    Body mass index is 34.57 kg/m .  Physical Exam   GENERAL: alert and no distress  NECK: no adenopathy, no asymmetry, masses, or scars  RESP: lungs clear to auscultation - no rales, rhonchi or wheezes  CV: regular rate and rhythm, normal S1 S2, no S3 or S4, no murmur, click or rub, no peripheral edema  ABDOMEN: soft, nontender, no hepatosplenomegaly, no masses and bowel sounds normal  MS: no gross musculoskeletal defects noted, no edema    Treatment " for vaginal candidiasis        Signed Electronically by: Alphonso Mcfarlane MD

## 2024-10-17 NOTE — TELEPHONE ENCOUNTER
Patient calling because her flagyl was not sent to pharmacy. Per chart review appears ordered as CAM medication. Writer signed order as prescribed by Dr. Mcfarlane and sent to pharmacy as fulfilling signed order.    NADIYA Myles, RN  [facility]

## 2024-10-25 ENCOUNTER — TRANSFERRED RECORDS (OUTPATIENT)
Dept: HEALTH INFORMATION MANAGEMENT | Facility: CLINIC | Age: 49
End: 2024-10-25
Payer: COMMERCIAL

## 2024-11-01 DIAGNOSIS — R05.1 ACUTE COUGH: ICD-10-CM

## 2024-11-01 RX ORDER — ALBUTEROL SULFATE 90 UG/1
2 INHALANT RESPIRATORY (INHALATION) EVERY 6 HOURS PRN
Qty: 18 G | Refills: 0 | Status: SHIPPED | OUTPATIENT
Start: 2024-11-01

## 2024-11-21 ENCOUNTER — OFFICE VISIT (OUTPATIENT)
Dept: FAMILY MEDICINE | Facility: CLINIC | Age: 49
End: 2024-11-21
Payer: COMMERCIAL

## 2024-11-21 VITALS
SYSTOLIC BLOOD PRESSURE: 118 MMHG | DIASTOLIC BLOOD PRESSURE: 66 MMHG | RESPIRATION RATE: 18 BRPM | OXYGEN SATURATION: 98 % | TEMPERATURE: 98.1 F | WEIGHT: 191.4 LBS | HEIGHT: 62 IN | HEART RATE: 55 BPM | BODY MASS INDEX: 35.22 KG/M2

## 2024-11-21 DIAGNOSIS — J30.2 SEASONAL ALLERGIC RHINITIS, UNSPECIFIED TRIGGER: ICD-10-CM

## 2024-11-21 DIAGNOSIS — Z00.00 ENCOUNTER FOR ROUTINE HISTORY AND PHYSICAL EXAM IN FEMALE: Primary | ICD-10-CM

## 2024-11-21 DIAGNOSIS — Z12.31 VISIT FOR SCREENING MAMMOGRAM: ICD-10-CM

## 2024-11-21 DIAGNOSIS — Z13.1 DIABETES MELLITUS SCREENING: ICD-10-CM

## 2024-11-21 DIAGNOSIS — Z13.220 LIPID SCREENING: ICD-10-CM

## 2024-11-21 DIAGNOSIS — M72.2 PLANTAR FASCIITIS: ICD-10-CM

## 2024-11-21 LAB
ALBUMIN SERPL BCG-MCNC: 4.1 G/DL (ref 3.5–5.2)
ALBUMIN UR-MCNC: NEGATIVE MG/DL
ALP SERPL-CCNC: 79 U/L (ref 40–150)
ALT SERPL W P-5'-P-CCNC: 21 U/L (ref 0–50)
ANION GAP SERPL CALCULATED.3IONS-SCNC: 10 MMOL/L (ref 7–15)
APPEARANCE UR: CLEAR
AST SERPL W P-5'-P-CCNC: 30 U/L (ref 0–45)
BACTERIA #/AREA URNS HPF: ABNORMAL /HPF
BILIRUB SERPL-MCNC: 0.5 MG/DL
BILIRUB UR QL STRIP: NEGATIVE
BUN SERPL-MCNC: 15.9 MG/DL (ref 6–20)
CALCIUM SERPL-MCNC: 9.6 MG/DL (ref 8.8–10.4)
CHLORIDE SERPL-SCNC: 108 MMOL/L (ref 98–107)
CHOLEST SERPL-MCNC: 112 MG/DL
COLOR UR AUTO: YELLOW
CREAT SERPL-MCNC: 0.74 MG/DL (ref 0.51–0.95)
EGFRCR SERPLBLD CKD-EPI 2021: >90 ML/MIN/1.73M2
ERYTHROCYTE [DISTWIDTH] IN BLOOD BY AUTOMATED COUNT: 13.7 % (ref 10–15)
EST. AVERAGE GLUCOSE BLD GHB EST-MCNC: 126 MG/DL
FASTING STATUS PATIENT QL REPORTED: ABNORMAL
FASTING STATUS PATIENT QL REPORTED: ABNORMAL
GLUCOSE SERPL-MCNC: 109 MG/DL (ref 70–99)
GLUCOSE UR STRIP-MCNC: NEGATIVE MG/DL
HBA1C MFR BLD: 6 % (ref 0–5.6)
HCO3 SERPL-SCNC: 24 MMOL/L (ref 22–29)
HCT VFR BLD AUTO: 38.2 % (ref 35–47)
HDLC SERPL-MCNC: 44 MG/DL
HGB BLD-MCNC: 12.1 G/DL (ref 11.7–15.7)
HGB UR QL STRIP: NEGATIVE
KETONES UR STRIP-MCNC: NEGATIVE MG/DL
LDLC SERPL CALC-MCNC: 57 MG/DL
LEUKOCYTE ESTERASE UR QL STRIP: NEGATIVE
MCH RBC QN AUTO: 26.5 PG (ref 26.5–33)
MCHC RBC AUTO-ENTMCNC: 31.7 G/DL (ref 31.5–36.5)
MCV RBC AUTO: 84 FL (ref 78–100)
NITRATE UR QL: NEGATIVE
NONHDLC SERPL-MCNC: 68 MG/DL
PH UR STRIP: 6.5 [PH] (ref 5–8)
PLATELET # BLD AUTO: 206 10E3/UL (ref 150–450)
POTASSIUM SERPL-SCNC: 4.2 MMOL/L (ref 3.4–5.3)
PROT SERPL-MCNC: 7.4 G/DL (ref 6.4–8.3)
RBC # BLD AUTO: 4.57 10E6/UL (ref 3.8–5.2)
RBC #/AREA URNS AUTO: ABNORMAL /HPF
SODIUM SERPL-SCNC: 142 MMOL/L (ref 135–145)
SP GR UR STRIP: 1.02 (ref 1–1.03)
SQUAMOUS #/AREA URNS AUTO: ABNORMAL /LPF
TRIGL SERPL-MCNC: 55 MG/DL
UROBILINOGEN UR STRIP-ACNC: 0.2 E.U./DL
WBC # BLD AUTO: 4.7 10E3/UL (ref 4–11)
WBC #/AREA URNS AUTO: ABNORMAL /HPF

## 2024-11-21 RX ORDER — LEVOCETIRIZINE DIHYDROCHLORIDE 5 MG/1
5 TABLET, FILM COATED ORAL EVERY EVENING
Qty: 60 TABLET | Refills: 1 | Status: SHIPPED | OUTPATIENT
Start: 2024-11-21

## 2024-11-21 RX ORDER — LEVOCETIRIZINE DIHYDROCHLORIDE 5 MG/1
5 TABLET, FILM COATED ORAL EVERY EVENING
Qty: 90 TABLET | OUTPATIENT
Start: 2024-11-21

## 2024-11-21 SDOH — HEALTH STABILITY: PHYSICAL HEALTH: ON AVERAGE, HOW MANY DAYS PER WEEK DO YOU ENGAGE IN MODERATE TO STRENUOUS EXERCISE (LIKE A BRISK WALK)?: 1 DAY

## 2024-11-21 SDOH — HEALTH STABILITY: PHYSICAL HEALTH: ON AVERAGE, HOW MANY MINUTES DO YOU ENGAGE IN EXERCISE AT THIS LEVEL?: 30 MIN

## 2024-11-21 ASSESSMENT — SOCIAL DETERMINANTS OF HEALTH (SDOH): HOW OFTEN DO YOU GET TOGETHER WITH FRIENDS OR RELATIVES?: ONCE A WEEK

## 2024-11-21 ASSESSMENT — PAIN SCALES - GENERAL: PAINLEVEL_OUTOF10: NO PAIN (0)

## 2024-11-21 NOTE — PROGRESS NOTES
"Preventive Care Visit  [facility]  Alphonso Mcfarlane MD, Family Medicine  Nov 21, 2024      Assessment & Plan     Encounter for routine history and physical exam in female      - Lipid panel reflex to direct LDL Fasting; Future  - Comprehensive metabolic panel (BMP + Alb, Alk Phos, ALT, AST, Total. Bili, TP); Future  - CBC with platelets; Future  - UA with Microscopic reflex to Culture - lab collect; Future    Diabetes mellitus screening    - Hemoglobin A1c; Future    Lipid screening    - Lipid panel reflex to direct LDL Fasting; Future    Visit for screening mammogram    - MA Screening Bilateral w/ [place]; Future    Seasonal allergic rhinitis, unspecified trigger    - levocetirizine (XYZAL) 5 MG tablet; Take 1 tablet (5 mg) by mouth every evening.    Patient has been advised of split billing requirements and indicates understanding: Yes        BMI  Estimated body mass index is 35.01 kg/m  as calculated from the following:    Height as of this encounter: 1.575 m (5' 2\").    Weight as of this encounter: 86.8 kg (191 lb 6.4 oz).   Weight management plan: Discussed healthy diet and exercise guidelines    Counseling  Appropriate preventive services were addressed with this patient via screening, questionnaire, or discussion as appropriate for fall prevention, nutrition, physical activity, Tobacco-use cessation, social engagement, weight loss and cognition.  Checklist reviewing preventive services available has been given to the patient.  Reviewed patient's diet, addressing concerns and/or questions.   She is at risk for lack of exercise and has been provided with information to increase physical activity for the benefit of her well-being.           Viktoria Chua is a 49 year old, presenting for the following: Patient presents for her annual health screening.  She is due for a mammogram and next year will be due for Pap smear.  Does have a history of nasal congestion and some ear discomfort she is " seeing ear ENT allergy dentist.  No apparent diagnosis she was allergic to cats and dogs but they do not have any at home.  She is also allergic to some type of trees.  She has never had any treatment she was given Flonase for a while but it did not help she only use it for a week or so.  I think we should try some sertraline on a daily basis for 2 months and see if we get any improvement on an empirical basis.  My exam was negative she is healthy raising 3 children at home.  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  She is having some discomfort in her left heel which is consistent with plantar fasciitis we did give her some exercises.  I enjoyed our visit today she has a past medical history of an umbilical hernia which is now asymptomatic she plans on increasing her aerobic exercises appropriate workup will be done today I enjoyed seeing her  Recheck Medication and Physical        11/21/2024     8:14 AM   Additional Questions   Roomed by am cma        Do you have a current opioid prescription? no  Do you use any other controlled substances or medications that are not prescribed by a provider?  no      Health Care Directive  Patient does not have a Health Care Directive:       11/21/2024   General Health   How would you rate your overall physical health? Good   Feel stress (tense, anxious, or unable to sleep) Not at all            11/21/2024   Nutrition   Three or more servings of calcium each day? Yes   Diet: Regular (no restrictions)   How many servings of fruit and vegetables per day? (!) 2-3   How many sweetened beverages each day? 0-1            11/21/2024   Exercise   Days per week of moderate/strenous exercise 1 day   Average minutes spent exercising at this level 30 min      (!) EXERCISE CONCERN      11/21/2024   Social Factors   Frequency of gathering with friends or relatives Once a week   Worry food won't last until get money to buy more No   Food not last or not have enough money for food? No   Do you have housing?  (Housing is defined as stable permanent housing and does not include staying ouside in a car, in a tent, in an abandoned building, in an overnight shelter, or couch-surfing.) Yes   Are you worried about losing your housing? No   Lack of transportation? No   Unable to get utilities (heat,electricity)? No            11/21/2024   Dental   Dentist two times every year? Yes            11/21/2024   TB Screening   Were you born outside of the [place]? Yes              Today's PHQ-2 Score:       1/15/2024     1:13 PM   PHQ-2 ( 1999 [employer])   Q1: Little interest or pleasure in doing things 0    Q2: Feeling down, depressed or hopeless 0    PHQ-2 Score 0   Q1: Little interest or pleasure in doing things Not at all   Q2: Feeling down, depressed or hopeless Not at all   PHQ-2 Score 0       Patient-reported         11/21/2024   Substance Use   Alcohol more than 3/day or more than 7/wk Not Applicable   Do you use any other substances recreationally? No        Social History     Tobacco Use    Smoking status: Never     Passive exposure: Never    Smokeless tobacco: Never   Vaping Use    Vaping status: Never Used   Substance Use Topics    Alcohol use: No    Drug use: No           11/18/2022   LAST FHS-7 RESULTS   1st degree relative breast or ovarian cancer No   Any relative bilateral breast cancer No   Any male have breast cancer No   Any ONE woman have BOTH breast AND ovarian cancer No   Any woman with breast cancer before 50yrs No   2 or more relatives with breast AND/OR ovarian cancer No   2 or more relatives with breast AND/OR bowel cancer No                11/21/2024   STI Screening   New sexual partner(s) since last STI/HIV test? No        History of abnormal Pap smear:         Latest Ref Rng & Units 10/31/2022     8:48 AM 4/17/2018    11:36 AM   PAP / HPV   PAP  Negative for Intraepithelial Lesion or Malignancy (NILM)  Negative for squamous intraepithelial lesion or malignancy  Electronically signed by Cristina Nicole CT (ASCP)  "on 4/23/2018 at  3:08 PM      HPV 16 DNA Negative Negative  Negative    HPV 18 DNA Negative Negative  Negative    Other HR HPV Negative Negative  Negative      ASCVD Risk   The ASCVD Risk score (Laine CORTEZ, et al., 2019) failed to calculate for the following reasons:    The valid total cholesterol range is 130 to 320 mg/dL        11/21/2024   Contraception/Family Planning   Questions about contraception or family planning No           Reviewed and updated as needed this visit by Provider                         Objective    Exam  /66   Pulse 55   Temp 98.1  F (36.7  C) (Oral)   Resp 18   Ht 1.575 m (5' 2\")   Wt 86.8 kg (191 lb 6.4 oz)   LMP  (LMP Unknown)   SpO2 98%   BMI 35.01 kg/m     Estimated body mass index is 35.01 kg/m  as calculated from the following:    Height as of this encounter: 1.575 m (5' 2\").    Weight as of this encounter: 86.8 kg (191 lb 6.4 oz).    Physical Exam      Signed Electronically by: Alphonso Mcfarlane MD    "

## 2024-11-30 ENCOUNTER — HOSPITAL ENCOUNTER (EMERGENCY)
Facility: HOSPITAL | Age: 49
Discharge: HOME | End: 2024-11-30
Admitting: STUDENT IN AN ORGANIZED HEALTH CARE EDUCATION/TRAINING PROGRAM
Payer: COMMERCIAL

## 2024-11-30 VITALS
SYSTOLIC BLOOD PRESSURE: 132 MMHG | BODY MASS INDEX: 35.75 KG/M2 | WEIGHT: 194.3 LBS | HEART RATE: 59 BPM | DIASTOLIC BLOOD PRESSURE: 60 MMHG | RESPIRATION RATE: 18 BRPM | TEMPERATURE: 98.3 F | OXYGEN SATURATION: 97 % | HEIGHT: 62 IN

## 2024-11-30 DIAGNOSIS — H93.8X1 EAR FULLNESS, RIGHT: ICD-10-CM

## 2024-11-30 PROCEDURE — 99283 EMERGENCY DEPT VISIT LOW MDM: CPT

## 2024-11-30 ASSESSMENT — ACTIVITIES OF DAILY LIVING (ADL): ADLS_ACUITY_SCORE: 41

## 2024-11-30 NOTE — DISCHARGE INSTRUCTIONS
As we discussed, I would like you to follow-up with Dr. Dhillon or one of his partners given your worsening ear fullness sensation.  They should be calling you on Monday to schedule this.  It does appear that you may have an early ear infection.  Will start you on the antibiotic Augmentin.  Please restart wearing your mouth care and continue your allergy medication.  If you develop fever, worsening pain, drainage from the ear, worsening hearing, vertigo, facial droop or any new or concerning symptoms please return to the ER for further evaluation.

## 2024-11-30 NOTE — ED TRIAGE NOTES
C/o R ear feeling clogged since last pm and feeling an echo feeling in R ear.  Denies pain.     Triage Assessment (Adult)       Row Name 11/30/24 1024          Triage Assessment    Airway WDL WDL        Respiratory WDL    Respiratory WDL WDL        Skin Circulation/Temperature WDL    Skin Circulation/Temperature WDL WDL        Cardiac WDL    Cardiac WDL WDL        Peripheral/Neurovascular WDL    Peripheral Neurovascular WDL WDL        Cognitive/Neuro/Behavioral WDL    Cognitive/Neuro/Behavioral WDL WDL

## 2024-11-30 NOTE — ED PROVIDER NOTES
Emergency Department Encounter   NAME: Toma White ; AGE: 49 year old female ; YOB: 1975 ; MRN: [id number] ; PCP: [facility]   ED PROVIDER: Asha Davenport PA-C    Evaluation Date & Time:   11/30/2024 10:19 AM    CHIEF COMPLAINT:  Ear Fullness      Impression and Plan   MDM: Toma White is a 49 year old female who presents to the ED for evaluation of ear fullness.  Per chart review, patient had a long history of ongoing right-sided ear fullness and facial pressure.  She follows with Dr. Dhillon, ENT, orthodontist, and audiology.  Orthodontist with concerns for possible TMJ and recommended mouthguard which did cause some improvement.  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  She had a normal audiogram and ECochG testing, and ENT recommended massage, soft diet, and warm compresses for ear pain/fullness after her last visit in May of 2023.  Patient has had intermittent symptoms since and has been frustrated with her care there is no clear etiology.  Recently told she likely has allergies, started on the antihistamine, and referred to allergist.  Symptoms ramped up last night with right-sided ear fullness, facial discomfort, and echoing in her right ear.  Here in the ER, she is generally well-appearing, very pleasant, and in no acute distress.  No associated neurologic symptoms, vertigo at this time, or cranial nerve deficits.  No concern for CVA/TIA, and given MRI imaging performed in the past, low suspicion for mass or cholesteatoma.  No tinnitus or whooshing to suggest vascular etiology. No eye pain, vision changes, or temporal headache to suggest GCA.  Her right TM is cloudy, not opaque, and has some mild erythema which could be suggestive of early otitis media.  Canals clear.  No evidence of otitis externa.  Performed tuning fork exam - Naranjo normal and Rinne positive -  consistent with normal exam. Lower suspicion for sensorineural hearing loss given the chronicity of symptoms with normal hearing test through outpatient clinic.  She has not been wearing her mouthguard which previously provided good relief and she was advised to restart this.  We discussed trialing a 5-day course of Augmentin for early ear infection in the setting of worsening symptoms but she is agreeable to.  Antibiotic side effects discussed.  She already has an established ENT, emergent referral placed, and advised to follow back up with them closely for next step recommendations.  At this time, no evidence of emergent source of her symptoms and we reviewed concerning signs and symptoms to return to the ER.  She verbalized understanding is comfortable with the plan.  Discharged home in good condition.      Medical Decision Making  Obtained supplemental history:Supplemental history obtained?: No  Reviewed external records: External records reviewed?: Outpatient Record: Clinic visit on 11/21/2024; ENT visit on 5/5/2023; audiology visit on 1/10/2023; ENT visit on 8/31/2022   Care impacted by chronic illness:Other: vertigo, TMJ  Did you consider but not order tests?: Work up considered but not performed and documented in chart, if applicable  Did you interpret images independently?: Independent interpretation of ECG and images noted in documentation, when applicable.  Consultation discussion with other provider:Did you involve another provider (consultant, MH, pharmacy, etc.)?: No  Discharge. I prescribed additional prescription strength medication(s) as charted. See documentation for any additional details.    MIPS: Not Applicable      ED COURSE:  10:39 AM I met and introduced myself to the patient. I gathered initial history and performed my physical exam. We discussed plan for discharge, follow up, and reasons to return to the ED.     At the conclusion of the encounter I discussed the results of all the tests and  the disposition. The questions were answered. The patient or family acknowledged understanding and was agreeable with the care plan.    FINAL IMPRESSION:    ICD-10-CM    1. Ear fullness, right  H93.8X1 Adult ENT  Referral            MEDICATIONS GIVEN IN THE EMERGENCY DEPARTMENT:  Medications - No data to display      NEW PRESCRIPTIONS STARTED AT TODAY'S ED VISIT:  New Prescriptions    AMOXICILLIN-CLAVULANATE (AUGMENTIN) 875-125 MG TABLET    Take 1 tablet by mouth 2 times daily for 5 days.         HPI   Use of Intrepreter: N/A    Toma White is a 49 year old female with a pertinent history of conductive hearing loss, obesity, herpes simplex type I, umbilical hernia, cervical dysplasia, H. pylori, who presents to the ED by private vehicle for evaluation of ear fullness.    Per patient, she has a history of fullness and hearing loss in her right ear.  She does see an ENT doctor though has not seen him for some time.  She is frustrated as she has seen many different specialists and does not feel that she has an answer to her ongoing symptoms.  Things did seem to improve when she was wearing a mouthguard at night prescribed by her dentist though has since stopped using it.  She recently saw a doctor in her clinic who thought her symptoms were due to allergies and she was started on an oral allergy medication and has a follow-up with an allergy specialist that she is waiting on.  She states that last night she started having a clogged sensation in her right ear with worsening hearing and an echo sensation.  At times she has fullness to the right side of her head that comes and goes.  No fever or chills, no eye pain or visual changes, no drainage from the ear pain behind the ear.  No trauma to the ear or recent viral illnesses.      REVIEW OF SYSTEMS:  Pertinent positive and negative symptoms per HPI.       Medical History     Past Medical History:   Diagnosis Date    Abnormal Pap smear of cervix     Anemia   "   Herpes        Past Surgical History:   Procedure Laterality Date     SECTION  14    NV LAP,TUBAL CAUTERY Bilateral 2018    Procedure: LAPAROSCOPIC BILATERAL SALPINGECTOMY;  Surgeon: Kylah Rivas MD;  Location: [facility];  Service: Gynecology    [facility]  DELIVERY ONLY      Description:  Section;  Recorded: 2011;       Family History   Problem Relation Age of Onset    No Known Problems Mother     No Known Problems Father        Social History     Tobacco Use    Smoking status: Never     Passive exposure: Never    Smokeless tobacco: Never   Vaping Use    Vaping status: Never Used   Substance Use Topics    Alcohol use: No    Drug use: No       amoxicillin-clavulanate (AUGMENTIN) 875-125 MG tablet  albuterol (PROAIR HFA/PROVENTIL HFA/VENTOLIN HFA) 108 (90 Base) MCG/ACT inhaler  levocetirizine (XYZAL) 5 MG tablet          Physical Exam     First Vitals:  Patient Vitals for the past 24 hrs:   BP Temp Temp src Pulse Resp SpO2 Height Weight   24 1023 132/60 98.3  F (36.8  C) Oral 60 18 97 % 1.575 m (5' 2\") 88.1 kg (194 lb 4.8 oz)         PHYSICAL EXAM:   Physical Exam  Vitals reviewed.   Constitutional:       General: She is not in acute distress.     Appearance: She is not ill-appearing, toxic-appearing or diaphoretic.   HENT:      Head:      Comments: No sinus tenderness or fullness.     Right Ear: Ear canal and external ear normal.      Left Ear: Tympanic membrane, ear canal and external ear normal.      Ears:      Comments: Right TM is cloudy and not opaque. Mild erythema present. No bulging. No drainage in the ear canal.  No foreign body in the ear canal.  No mastoid swelling or tenderness.  No palpable adenopathy to her head or neck.     Nose:      Comments: Erythema of bilateral turbinates with polyp in her left nare.  No drainage.     Mouth/Throat:      Mouth: Mucous membranes are moist.      Pharynx: Oropharynx is clear.   Eyes:      Conjunctiva/sclera: " Conjunctivae normal.      Pupils: Pupils are equal, round, and reactive to light.   Pulmonary:      Effort: Pulmonary effort is normal.   Musculoskeletal:      Cervical back: Normal range of motion and neck supple.   Lymphadenopathy:      Cervical: No cervical adenopathy.   Neurological:      Mental Status: She is alert.      Cranial Nerves: Cranial nerves 2-12 are intact. No facial asymmetry.             Results     LAB:  All pertinent labs reviewed and interpreted  Labs Ordered and Resulted from Time of ED Arrival to Time of ED Departure - No data to display    RADIOLOGY:  No orders to display       Asha Davenport PA-C   Emergency Medicine   [facility] EMERGENCY DEPARTMENT       Asha Davenport PA-C  11/30/24 [id number]

## 2024-12-03 ENCOUNTER — ANCILLARY PROCEDURE (OUTPATIENT)
Dept: MAMMOGRAPHY | Facility: HOSPITAL | Age: 49
End: 2024-12-03
Attending: FAMILY MEDICINE
Payer: COMMERCIAL

## 2024-12-03 DIAGNOSIS — Z12.31 VISIT FOR SCREENING MAMMOGRAM: ICD-10-CM

## 2024-12-03 PROCEDURE — 77067 SCR MAMMO BI INCL CAD: CPT

## 2024-12-03 PROCEDURE — 77063 BREAST TOMOSYNTHESIS BI: CPT

## 2025-02-17 ENCOUNTER — TRANSFERRED RECORDS (OUTPATIENT)
Dept: HEALTH INFORMATION MANAGEMENT | Facility: CLINIC | Age: 50
End: 2025-02-17
Payer: COMMERCIAL

## 2025-02-26 ENCOUNTER — TRANSFERRED RECORDS (OUTPATIENT)
Dept: HEALTH INFORMATION MANAGEMENT | Facility: CLINIC | Age: 50
End: 2025-02-26
Payer: COMMERCIAL

## 2025-06-17 ASSESSMENT — SLEEP AND FATIGUE QUESTIONNAIRES
HOW LIKELY ARE YOU TO NOD OFF OR FALL ASLEEP WHILE WATCHING TV: WOULD NEVER DOZE
HOW LIKELY ARE YOU TO NOD OFF OR FALL ASLEEP WHILE LYING DOWN TO REST IN THE AFTERNOON WHEN CIRCUMSTANCES PERMIT: MODERATE CHANCE OF DOZING
HOW LIKELY ARE YOU TO NOD OFF OR FALL ASLEEP WHEN YOU ARE A PASSENGER IN A CAR FOR AN HOUR WITHOUT A BREAK: SLIGHT CHANCE OF DOZING
HOW LIKELY ARE YOU TO NOD OFF OR FALL ASLEEP WHILE SITTING QUIETLY AFTER LUNCH WITHOUT ALCOHOL: SLIGHT CHANCE OF DOZING
HOW LIKELY ARE YOU TO NOD OFF OR FALL ASLEEP WHILE SITTING AND TALKING TO SOMEONE: WOULD NEVER DOZE
HOW LIKELY ARE YOU TO NOD OFF OR FALL ASLEEP WHILE SITTING AND READING: WOULD NEVER DOZE
HOW LIKELY ARE YOU TO NOD OFF OR FALL ASLEEP IN A CAR, WHILE STOPPED FOR A FEW MINUTES IN TRAFFIC: WOULD NEVER DOZE
HOW LIKELY ARE YOU TO NOD OFF OR FALL ASLEEP WHILE SITTING INACTIVE IN A PUBLIC PLACE: WOULD NEVER DOZE

## 2025-06-18 ENCOUNTER — VIRTUAL VISIT (OUTPATIENT)
Dept: SLEEP MEDICINE | Facility: CLINIC | Age: 50
End: 2025-06-18
Payer: COMMERCIAL

## 2025-06-18 VITALS — HEIGHT: 62 IN | BODY MASS INDEX: 35.54 KG/M2

## 2025-06-18 DIAGNOSIS — R06.83 SNORING: Primary | ICD-10-CM

## 2025-06-18 PROCEDURE — 1126F AMNT PAIN NOTED NONE PRSNT: CPT | Mod: 95 | Performed by: INTERNAL MEDICINE

## 2025-06-18 PROCEDURE — 98005 SYNCH AUDIO-VIDEO EST LOW 20: CPT | Performed by: INTERNAL MEDICINE

## 2025-06-18 ASSESSMENT — PAIN SCALES - GENERAL: PAINLEVEL_OUTOF10: NO PAIN (0)

## 2025-06-18 NOTE — PROGRESS NOTES
Virtual Visit Details    Type of service:  Video Visit     Originating Location (pt. Location): Home  Distant Location (provider location):  Off-site  Platform used for Video Visit: AmWell    Additional 15 minutes on the date of service was spent performing the following:    -Preparing to see the patient  -Obtaining and/or reviewing separately obtained history   -Ordering medications, tests, or procedures   -Documenting clinical information in the electronic or other health record     Thank you for the opportunity to participate in the care of Toma White.     She is a 50 year old  female patient who comes to the sleep medicine clinic for review of sleep study results. The patient had a sleep study performed on 06/29/2023 (AHI=1.4). The patient otherwise denies any episodes of witness apnea or daytime sleepiness. She also denies any daytime impairment such as memory issues or anxiety.    Assessment and Plan:  In summary Toma White is a 50 year old year old female here for review of sleep study results.  1. Snoring (Primary)  I informed the patient I do not have any justification to pursue an in lab sleep study. I recommend that she try positional therapy to see if it helps. I welcomed the patient to follow up with us on an as needed basis.    Lab reviewed: Discussed with patient.    Sleep-Wake Cycle:    The patient likes to initiate sleep at around 10 PM with a sleep latency of less than 20 minutes. The patient has 1 nocturnal awakenings. Final wake up time is around 7 AM.    MAMI:  MAMI Total Score: (Patient-Rptd) 7  Total score - Cliff: (Patient-Rptd) 4 (6/17/2025  3:47 PM)    Patient Active Problem List   Diagnosis    Human Papilloma Virus Infection    Cerv Pap Smear (+) Atyp Squamous Cells Undetermined Signif    Dysplasia of cervix, high grade WILLIS 2    Herpes Simplex Type II    Helicobacter Pylori (H. Pylori) Infection    Umbilical Hernia    Herpes Simplex Type I    Conductive Hearing Loss    Esophageal  Reflux    Impaired Glucose Tolerance Test    Vitamin D deficiency    Obesity (BMI 35.0-39.9) with comorbidity (H)    Vertigo    TMJ (temporomandibular joint syndrome)    Left shoulder pain, unspecified chronicity    Snoring    Clenching of teeth    Bilateral temporomandibular joint pain    Seborrheic dermatitis    Class 2 obesity due to excess calories without serious comorbidity with body mass index (BMI) of 35.0 to 35.9 in adult       Current Outpatient Medications   Medication Sig Dispense Refill    albuterol (PROAIR HFA/PROVENTIL HFA/VENTOLIN HFA) 108 (90 Base) MCG/ACT inhaler INHALE 2 PUFFS INTO THE LUNGS EVERY 6 HOURS AS NEEDED 18 g 0    levocetirizine (XYZAL) 5 MG tablet Take 1 tablet (5 mg) by mouth every evening. 60 tablet 1       Allergies   Allergen Reactions    Animal Dander     Cats     Cockroach     Mold     Seasonal Allergies     Adhesive Tape-Silicones [Adhesive Tape] Rash     Was seen in the hospital and got rash where adhesive-tape was placed        Visual Exam:  GEN: NAD  Psych: normal mood, normal affect     Labs/Studies:  - We reviewed the results of the overnight study as described on the HPI.     Lab Results   Component Value Date    TSH 1.77 02/21/2024    TSH 0.95 10/14/2021     Lab Results   Component Value Date     (H) 11/21/2024     (H) 10/22/2023     Lab Results   Component Value Date    HGB 12.1 11/21/2024    HGB 13.1 10/22/2023     Lab Results   Component Value Date    BUN 15.9 11/21/2024    BUN 14.7 10/22/2023    CR 0.74 11/21/2024    CR 0.74 10/22/2023     Lab Results   Component Value Date    AST 30 11/21/2024    AST 26 10/14/2021    ALT 21 11/21/2024    ALT 26 10/14/2021    ALKPHOS 79 11/21/2024    ALKPHOS 65 10/14/2021    BILITOTAL 0.5 11/21/2024    BILITOTAL 0.7 10/14/2021     Recent Labs   Lab Test 11/21/24  0854 10/22/23  1851    138   POTASSIUM 4.2 3.7   CHLORIDE 108* 102   CO2 24 26   ANIONGAP 10 10   * 103*   BUN 15.9 14.7   CR 0.74 0.74   MARIO 9.6  9.5         Patient was strongly advised in AVS to avoid driving, operating any heavy machinery or other hazardous situations while drowsy or sleepy. Patient was counseled on the importance of driving while alert, to pull over if drowsy, or nap before getting into the vehicle if sleepy.     Patient verbalized understanding of these issues, agrees with the plan and all questions were answered today. Patient was given an opportuntity to voice any other symptoms or concerns not listed above. Patient did not have any other symptoms or concerns.      David Aquino DO  Board Certified in Internal Medicine and Sleep Medicine      (Note created with Dragon voice recognition and unintended spelling errors and word substitutions may occur)

## 2025-06-18 NOTE — PATIENT INSTRUCTIONS
Please do not drive drowsy under any circumstances.  If you find yourself in a situation in which you are driving and feeling sleepy, please pull over right away in a safe place and take a quick nap before getting back on the road.    Snoring treatment.    Positional therapy- consider getting bricks to put underneath the head of your bed to turn your bed into a wedge. Your aim is to elevated the plane of your bed so you are sleeping with the head of your bed elevated.

## 2025-06-18 NOTE — NURSING NOTE
Current patient location: [address] [facility] [address]    Is the patient currently in the state of [place]? YES    Visit mode: VIDEO    If the visit is dropped, the patient can be reconnected by:VIDEO VISIT: Text to cell phone:   Telephone Information:   Mobile [phone]       Will anyone else be joining the visit? NO  (If patient encounters technical issues they should call [phone]    Are changes needed to the allergy or medication list? No    Are refills needed on medications prescribed by this physician? NO    Rooming Documentation:  Questionnaire(s) completed    Reason for visit: RECHECK    Albino SHEEHANF